# Patient Record
Sex: FEMALE | Race: BLACK OR AFRICAN AMERICAN | NOT HISPANIC OR LATINO | Employment: UNEMPLOYED | URBAN - METROPOLITAN AREA
[De-identification: names, ages, dates, MRNs, and addresses within clinical notes are randomized per-mention and may not be internally consistent; named-entity substitution may affect disease eponyms.]

---

## 2022-04-11 ENCOUNTER — HISTORICAL (OUTPATIENT)
Dept: ADMINISTRATIVE | Facility: HOSPITAL | Age: 52
End: 2022-04-11
Payer: MEDICAID

## 2022-04-28 VITALS
HEIGHT: 63 IN | BODY MASS INDEX: 24.38 KG/M2 | DIASTOLIC BLOOD PRESSURE: 88 MMHG | SYSTOLIC BLOOD PRESSURE: 125 MMHG | WEIGHT: 137.56 LBS

## 2022-05-01 ENCOUNTER — HOSPITAL ENCOUNTER (EMERGENCY)
Facility: HOSPITAL | Age: 52
Discharge: HOME | End: 2022-05-01
Attending: EMERGENCY MEDICINE
Payer: MEDICAID

## 2022-05-01 VITALS
BODY MASS INDEX: 22.32 KG/M2 | OXYGEN SATURATION: 98 % | HEIGHT: 66 IN | RESPIRATION RATE: 18 BRPM | TEMPERATURE: 99 F | SYSTOLIC BLOOD PRESSURE: 178 MMHG | DIASTOLIC BLOOD PRESSURE: 94 MMHG | HEART RATE: 100 BPM | WEIGHT: 138.88 LBS

## 2022-05-01 DIAGNOSIS — M54.50 CHRONIC LOW BACK PAIN WITHOUT SCIATICA, UNSPECIFIED BACK PAIN LATERALITY: Primary | ICD-10-CM

## 2022-05-01 DIAGNOSIS — Z76.0 ENCOUNTER FOR MEDICATION REFILL: ICD-10-CM

## 2022-05-01 DIAGNOSIS — G89.29 CHRONIC LOW BACK PAIN WITHOUT SCIATICA, UNSPECIFIED BACK PAIN LATERALITY: Primary | ICD-10-CM

## 2022-05-01 PROCEDURE — 99284 EMERGENCY DEPT VISIT MOD MDM: CPT

## 2022-05-01 RX ORDER — TRAZODONE HYDROCHLORIDE 100 MG/1
100 TABLET ORAL NIGHTLY
Qty: 30 TABLET | Refills: 1 | Status: SHIPPED | OUTPATIENT
Start: 2022-05-01 | End: 2023-05-01

## 2022-05-01 RX ORDER — TRAMADOL HYDROCHLORIDE 50 MG/1
100 TABLET ORAL EVERY 12 HOURS PRN
Qty: 28 TABLET | Refills: 0 | Status: SHIPPED | OUTPATIENT
Start: 2022-05-01

## 2022-05-01 NOTE — DISCHARGE INSTRUCTIONS
Keep scheduled follow-up appointment.      Let your primary care provider know that your out of prescriptions and will need refills, call them this week.      Expect to get more stiff and sore for 2 or 3 days before gradually returning back to normal.
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
## 2023-07-26 ENCOUNTER — HOSPITAL ENCOUNTER (EMERGENCY)
Facility: HOSPITAL | Age: 53
Discharge: HOME | End: 2023-07-26
Attending: PHYSICAL MEDICINE & REHABILITATION
Payer: MEDICAID

## 2023-07-26 VITALS
DIASTOLIC BLOOD PRESSURE: 126 MMHG | BODY MASS INDEX: 23.78 KG/M2 | HEART RATE: 79 BPM | WEIGHT: 148 LBS | OXYGEN SATURATION: 100 % | SYSTOLIC BLOOD PRESSURE: 153 MMHG | HEIGHT: 66 IN | RESPIRATION RATE: 20 BRPM | TEMPERATURE: 97 F

## 2023-07-26 DIAGNOSIS — G89.29 CHRONIC DENTAL PAIN: ICD-10-CM

## 2023-07-26 DIAGNOSIS — K08.9 CHRONIC DENTAL PAIN: ICD-10-CM

## 2023-07-26 DIAGNOSIS — H92.02 OTALGIA OF LEFT EAR: Primary | ICD-10-CM

## 2023-07-26 PROCEDURE — 99282 EMERGENCY DEPT VISIT SF MDM: CPT

## 2023-07-26 RX ORDER — ONDANSETRON 4 MG/1
8 TABLET, ORALLY DISINTEGRATING ORAL EVERY 8 HOURS PRN
Status: ON HOLD | COMMUNITY
Start: 2023-02-25 | End: 2024-03-29

## 2023-07-26 RX ORDER — AMLODIPINE BESYLATE 5 MG/1
5 TABLET ORAL
COMMUNITY
Start: 2023-03-03

## 2023-07-26 RX ORDER — LORAZEPAM 1 MG/1
1 TABLET ORAL 2 TIMES DAILY PRN
COMMUNITY
Start: 2023-05-12

## 2023-07-26 RX ORDER — CLONIDINE HYDROCHLORIDE 0.2 MG/1
0.2 TABLET ORAL EVERY 8 HOURS PRN
COMMUNITY
Start: 2023-02-26

## 2023-07-26 RX ORDER — ESCITALOPRAM OXALATE 10 MG/1
10 TABLET ORAL
COMMUNITY
Start: 2023-05-12

## 2023-07-26 RX ORDER — IBUPROFEN 800 MG/1
800 TABLET ORAL
COMMUNITY
Start: 2023-03-03

## 2023-07-26 NOTE — ED PROVIDER NOTES
Encounter Date: 7/26/2023       History     Chief Complaint   Patient presents with    Otalgia     Left ear pain and facial pain x 3 days.       PATIENT PRESENTS WITH CHIEF COMPLAINT OF LEFT EAR PAIN NO TRAUMA NO FEVER NO CHILLS NO NAUSEA NO VOMITING    The history is provided by the patient.   Review of patient's allergies indicates:  No Known Allergies  No past medical history on file.  No past surgical history on file.  No family history on file.     Review of Systems   HENT:  Positive for dental problem (CHRONIC POOR DENTITION) and ear pain (LEFT EAR).    All other systems reviewed and are negative.    Physical Exam     Initial Vitals [07/26/23 1544]   BP Pulse Resp Temp SpO2   (!) 153/126 79 20 97.3 °F (36.3 °C) 100 %      MAP       --         Physical Exam    Nursing note and vitals reviewed.  Constitutional: She appears well-developed and well-nourished.   HENT:   Head: Normocephalic and atraumatic.   Right Ear: External ear normal.   Left Ear: External ear normal.   Mouth/Throat: Oropharynx is clear and moist.   BILATERAL EARS ARE NORMAL, POOR DENTITION NO GINGIVAL HYPERPLASIA NO ABSCESS THERE IS TENDERNESS ALONG THE LEFT MANDIBULAR REGION POSSIBLE DENTAL INFECTION   Eyes: EOM are normal. Pupils are equal, round, and reactive to light.   Neck:   Normal range of motion.  Cardiovascular:  Normal rate, regular rhythm, normal heart sounds and intact distal pulses.           Pulmonary/Chest: Breath sounds normal.   Abdominal: Abdomen is soft. Bowel sounds are normal.   Musculoskeletal:         General: Normal range of motion.      Cervical back: Normal range of motion.     Neurological: She is alert and oriented to person, place, and time. She has normal strength. GCS score is 15. GCS eye subscore is 4. GCS verbal subscore is 5. GCS motor subscore is 6.   Skin: Skin is warm and dry.   Psychiatric: She has a normal mood and affect.       ED Course   Procedures  Labs Reviewed - No data to display       Imaging  Results    None          Medications - No data to display  Medical Decision Making:   Initial Assessment:   PATIENT PRESENTS WITH CHIEF COMPLAINT OF LEFT EAR PAIN NO TRAUMA NO FEVER NO CHILLS NO NAUSEA NO VOMITING    The history is provided by the patient.   Differential Diagnosis:   OTITIS MEDIA, OTITIS EXTERNA, DENTAL INFECTION,  ED Management:  DISCUSSED WITH THE PATIENT PROBABLE DENTAL INFECTION SUGGESTED ANTIBIOTICS THIS WAS REFUSED PATIENT STATES IT ALL ANTIBIOTICS UPSET HER STOMACH SHE WILL FOLLOW UP WITH A DENTIST AS INSTRUCTED PATIENT IS HEMODYNAMICALLY STABLE FOR DISCHARGE                        Clinical Impression:   Final diagnoses:  [H92.02] Otalgia of left ear (Primary)  [K08.9, G89.29] Chronic dental pain        ED Disposition Condition    Discharge Stable          ED Prescriptions    None       Follow-up Information       Follow up With Specialties Details Why Contact Info    Kd Ocampo MD Family Medicine  As needed [address] [facility] [place] [address]  [phone]               Quinn Huerta DO  07/26/23 7585

## 2024-03-06 ENCOUNTER — HOSPITAL ENCOUNTER (OUTPATIENT)
Dept: RADIOLOGY | Facility: HOSPITAL | Age: 54
Discharge: HOME | End: 2024-03-06
Attending: NURSE PRACTITIONER
Payer: MEDICAID

## 2024-03-06 DIAGNOSIS — M19.90 OSTEOARTHRITIS: ICD-10-CM

## 2024-03-06 PROCEDURE — 73562 X-RAY EXAM OF KNEE 3: CPT | Mod: TC,RT

## 2024-03-06 PROCEDURE — 73562 X-RAY EXAM OF KNEE 3: CPT | Mod: TC,LT

## 2024-03-19 DIAGNOSIS — M25.661 STIFFNESS OF RIGHT KNEE, NOT ELSEWHERE CLASSIFIED: ICD-10-CM

## 2024-03-19 DIAGNOSIS — M17.10 UNILATERAL PRIMARY OSTEOARTHRITIS, UNSPECIFIED KNEE: Primary | ICD-10-CM

## 2024-03-20 ENCOUNTER — CLINICAL SUPPORT (OUTPATIENT)
Dept: REHABILITATION | Facility: HOSPITAL | Age: 54
End: 2024-03-20
Payer: MEDICAID

## 2024-03-20 DIAGNOSIS — M17.10 UNILATERAL PRIMARY OSTEOARTHRITIS, UNSPECIFIED KNEE: Primary | ICD-10-CM

## 2024-03-20 DIAGNOSIS — M25.661 STIFFNESS OF RIGHT KNEE, NOT ELSEWHERE CLASSIFIED: ICD-10-CM

## 2024-03-20 PROCEDURE — 97163 PT EVAL HIGH COMPLEX 45 MIN: CPT

## 2024-03-20 NOTE — PLAN OF CARE
[facility]  Physical Therapy Initial Evaluation    Name: Shu Mcclure  [facility] Number: [id number]    Therapy Diagnosis:   Encounter Diagnoses   Name Primary?    Unilateral primary osteoarthritis, unspecified knee Yes    Stiffness of right knee, not elsewhere classified      Physician: Matthias Anderson,*FNP-C    Physician Orders: PT Eval and Treat  Medical Diagnosis from Referral: Unilateral primary osteoarthritis right knee  Evaluation Date: 3/20/2024  Authorization Period Expiration: TBD  Plan of Care Expiration: 6/20/2024  Visit # / Visits authorized: 0/ TBD    Time In: 1345  Time Out: 1425  Total Billable Time: 40 minutes    Surgery: None  Orthopedic Precautions: None  Pertinent History: History of bilateral hip pain    Subjective   Shu reports: She comes in with severe right knee pain, chronic long-standing. At current she is disabled. She has been treated for this right knee but no real relief. She had a scope done in which MD scraped away tissue but the following day severe swelling followed by surgery for fluid removal. Since then recurrent swelling in right knee. She wears a hinged knee brace that locks at 0 deg extension. She walks with a straight cane, states she has a rollator but does not use because it is too heavy to maneuver in community. Long history of bilateral hip pain, which she did PT, but notes both hips are significantly weakened. Her chief complaint is severe pain in the right knee. Multiple MD opinions given, states she was told she needs a TKA by one, and another is trying different measures before TKA.    Imaging  EXAMINATION: XR KNEE 3 VIEW RIGHT 3/06/2024  CLINICAL HISTORY: pain; Unspecified osteoarthritis, unspecified site  TECHNIQUE: AP, lateral, and Merchant views of the right knee were performed.  COMPARISON: None  FINDINGS:  Degenerative changes with tricompartmental osteophytes.  There is loss of bilateral joint space.  There is lateral  translocation of the tibia in relation to the femur.  No displaced fracture.  No effusion.  Impression: Moderate degenerative changes with no acute fracture.      EXAMINATION: XR KNEE 3 VIEW LEFT 3/06/2024  CLINICAL HISTORY: pain; Unspecified osteoarthritis, unspecified site  TECHNIQUE: AP, lateral, and Merchant views of the left knee were performed.  COMPARISON: 03/06/2024  FINDINGS:  Degenerative changes with tricompartmental osteophytes.  Mild loss of joint space of the medial compartment.  No effusion.  Impression: Mild to moderate degenerative changes with no acute abnormality.      Medical History:   No past medical history on file.    Surgical History:   Shu Mcclure  has no past surgical history on file.    Medications:   Shu has a current medication list which includes the following prescription(s): amlodipine, clonidine, escitalopram oxalate, ibuprofen, lorazepam, ondansetron, tramadol, and trazodone.    Allergies:   Review of patient's allergies indicates:  No Known Allergies     CMS Impairment/Limitation/Restriction for FOTO Survey  Therapist reviewed FOTO scores for Shu Mcclure on 3/20/2024. FOTO documents entered into [employer] - see Media section.  FOTO filled out by: Patient  Patient's Physical FS Primary Measure: 50  Risk Adjusted Statistical FOTO: 43 (predicted 60 by visit #13)  Limitation Score: 50%  Category: Mobility  KOOS, JR: 59.4% functional    Objective          Posture/Appearance    Right - hinge knee brace, standing in ~20 deg extensor lag, use of straight cane. Mild edema around knee joint line     Palpation    Right - tender to mild palpatory pressure distal medial femur (condyle), medial calf, medial distal hamstrings       Gait    Antalgic use of straight cane     ROM    Knee  Right - 10 deg extension lag, flexion WFL    Left - WFL     Strength    Right - knee flx 3, knee ext 2+, hip ER 3, hip IR 4, ankle DF 4  Left - knee flx 3, knee ext 4, hip ER 3, hip IR 4, ankle DF 4                     TREATMENT     Shu received the treatments listed below:       Time Activities   Manual     TherAct     TherEx     Gait     Neuro Re-ed     Modalities     E-Stim     Dry Needling     Canalith Repositioning       Home Exercises and Patient Education Provided    Education provided:   -Plan of care, HEP, ROM importance    Written Home Exercises Provided: yes.  Exercises were reviewed and Shu was able to demonstrate them prior to the end of the session.  Shu demonstrated good  understanding of the education provided.     Copy of exercises provided placed in paper chart.    Assessment   Shu is a 53 y.o. female referred to outpatient Physical Therapy with a medical diagnosis of right knee pain. Patient presents with severe functional debility 2/2 pain and LE weakness. Patient will benefit from skilled outpatient Physical Therapy to address the deficits stated above and in the chart below, provide education, and to maximize patient's level of independence.    Patient prognosis is Fair.     Plan of care discussed with patient: Yes  Patient's spiritual, cultural and educational needs considered and patient is agreeable to the plan of care and goals as stated below:    Anticipated Barriers for therapy: None    Goals:  Short Term Goals: 6 weeks   Patient will report at least 5% increase in functional capacity from initial KOOS, JR score to indicate clinically significant functional improvement  Patient will report at least 5 point increase on initial FOTO score to indicate clinically significant functional improvement    Long Term Goals: 12 weeks   Patient will report at least 10% increase in functional capacity from initial KOOS, JR score to indicate clinically significant functional improvement  Patient will report at least 10 point increase on initial FOTO score to indicate clinically significant functional improvement    Plan   Plan of care Certification: 3/20/2024 to  6/20/2024.    Outpatient Physical Therapy 2-3 times weekly for 12 weeks to include the following interventions: Gait Training, Manual Therapy, Moist Heat/ Ice, Neuromuscular Re-ed, Patient Education, Self Care, Therapeutic Activities, and Therapeutic Exercise.     Gem Givens, PT

## 2024-03-20 NOTE — PROGRESS NOTES
[facility]  Physical Therapy Initial Evaluation    Name: Shu Mcclure  Clinic Number: [id number]    Therapy Diagnosis:   Encounter Diagnoses   Name Primary?    Unilateral primary osteoarthritis, unspecified knee Yes    Stiffness of right knee, not elsewhere classified      Physician: Matthias Anderson,*FNP-C    Physician Orders: PT Eval and Treat  Medical Diagnosis from Referral: Unilateral primary osteoarthritis right knee  Evaluation Date: 3/20/2024  Authorization Period Expiration: TBD  Plan of Care Expiration: 6/20/2024  Visit # / Visits authorized: 0/ TBD    Time In: 13***  Time Out: 14***  Total Billable Time: *** minutes    Surgery: ***  Orthopedic Precautions: ***  Pertinent History: ***    Subjective   Shu reports: ***    Imaging  EXAMINATION: XR KNEE 3 VIEW RIGHT 3/06/2024  CLINICAL HISTORY: pain; Unspecified osteoarthritis, unspecified site  TECHNIQUE: AP, lateral, and Merchant views of the right knee were performed.  COMPARISON: None  FINDINGS:  Degenerative changes with tricompartmental osteophytes.  There is loss of bilateral joint space.  There is lateral translocation of the tibia in relation to the femur.  No displaced fracture.  No effusion.  Impression: Moderate degenerative changes with no acute fracture.      EXAMINATION: XR KNEE 3 VIEW LEFT 3/06/2024  CLINICAL HISTORY: pain; Unspecified osteoarthritis, unspecified site  TECHNIQUE: AP, lateral, and Merchant views of the left knee were performed.  COMPARISON: 03/06/2024  FINDINGS:  Degenerative changes with tricompartmental osteophytes.  Mild loss of joint space of the medial compartment.  No effusion.  Impression: Mild to moderate degenerative changes with no acute abnormality.      Medical History:   No past medical history on file.    Surgical History:   Shu Mcclure  has no past surgical history on file.    Medications:   Shu has a current medication list which includes the following  prescription(s): amlodipine, clonidine, escitalopram oxalate, ibuprofen, lorazepam, ondansetron, tramadol, and trazodone.    Allergies:   Review of patient's allergies indicates:  No Known Allergies     CMS Impairment/Limitation/Restriction for FOTO Survey  Therapist reviewed FOTO scores for Shu Mcclure on 3/20/2024. FOTO documents entered into [employer] - see Media section.  FOTO filled out by: Patient  Patient's Physical FS Primary Measure: ***  Risk Adjusted Statistical FOTO: ***  Limitation Score: ***%  Category: Mobility  KOOS, JR: ***% functional    Objective          Posture/Appearance    Right - ***; knee alignment {KNEE ALIGNMENT:62785}; Foot alignment ***; spinal curvature; edema ***; wound/surgical site ***    Left - ***; knee alignment {KNEE ALIGNMENT:70749}; Foot alignment ***; spinal curvature; edema ***; wound/surgical site ***     Palpation    Right - tender to *** palpatory pressure ***    Left - tender to *** palpatory pressure ***   Gait    ***     ROM    Knee  Right - ***    Left - ***     Strength    Right - knee flx, knee ext, hip ER, hip IR  Left - knee flx, knee ext, hip ER, hip IR     Dermatomes    Right - ***    Left - ***     Reflexes    Clonus: ***    Right - patellar *** ; Achilles ***  Left - patellar *** ; Achilles ***     Special Tests    Ant. Drawer: (***) right, (***) left  Post. Drawer: (***) right, (***) left  Lachman's: (***) right, (***) left  Valgus at 0 Deg: (***) right, (***) left  Valgus at 30 Deg: (***) right, (***) left  Varus at 0 Deg: (***) right, (***) left  Varus at 30 Deg: (***) right, (***) left  Sadaf's: (***) right, (***) left  Apley's Compression: (***) right, (***) left  Apley's Distraction: (***) right, (***) left  Tibial Interal Rot: (***) right, (***) left  Tibial External Rot: (***) right, (***) left  Proximal Tib/Fib: (***) right, (***) left  Dejan: (***) right, (***) left  Houghston's: (***) right, (***) left  Leslie's Sign: (***) right, (***)  "left  Ramirez's Sign (patellar grind): (***) right, (***) left  Patellar Tracking: (***) right, (***) left       Hip/Lumbar Exam    Lumbar ROM  {LUMBAR ROM:41933}    MITCHELL: (***) left, (***) right  FADDIR: (***) left, (***) right  Logroll: (***) left, (***) right  90/90 hamstring test: (***) left, (***) right  Piriformis test: (***) left, (***) right  Elver test: (***) left, (***) right  Hip IR ROM: *** deg left, *** deg right  Hip ER ROM: *** deg left, ***deg right  Charito's test: (***) left, (***) right  Labral Test: (***) left, (***) right     Functional Testing    Single Leg Squat  --Right knee - {KNEE ALIGNMENT:21420}  --Left knee - {KNEE ALIGNMENT:37100}      Double Leg Squat  --Right knee - {KNEE ALIGNMENT:54369}  --Left knee - {KNEE ALIGNMENT:37882}                        TREATMENT     Shu received the treatments listed below:       Time Activities   Manual     TherAct     TherEx     Gait     Neuro Re-ed     Modalities     E-Stim     Dry Needling     Canalith Repositioning       Home Exercises and Patient Education Provided    Education provided:   -Plan of care, HEP, ***    Written Home Exercises Provided: {Blank single:83687::"yes","Patient instructed to cont prior HEP"}.  Exercises were reviewed and Shu was able to demonstrate them prior to the end of the session.  Shu demonstrated {Desc; good/fair/poor:54048} understanding of the education provided.     Copy of exercises provided placed in paper chart.    Assessment   Shu is a 53 y.o. female referred to outpatient Physical Therapy with a medical diagnosis of ***. Patient presents with ***. Patient will benefit from skilled outpatient Physical Therapy to address the deficits stated above and in the chart below, provide education, and to maximize patient's level of independence.    Patient prognosis is {REHAB PROGNOSIS OHS:79982}.     Plan of care discussed with patient: Yes  Patient's spiritual, cultural and educational needs considered " and patient is agreeable to the plan of care and goals as stated below:    Anticipated Barriers for therapy: ***    Goals:  Short Term Goals: 6 weeks   Patient will report at least 10% increase in functional capacity from initial KOOS, JR score to indicate clinically significant functional improvement  Patient will report at least 10 point increase on initial FOTO score to indicate clinically significant functional improvement  Patient will ***    Long Term Goals: 12 weeks   Patient will report at least 20% increase in functional capacity from initial KOOS, JR score to indicate clinically significant functional improvement  Patient will report at least 20 point increase on initial FOTO score to indicate clinically significant functional improvement  Patient will ***    Plan   Plan of care Certification: 3/20/2024 to 6/20/2024.    Outpatient Physical Therapy 2-3 times weekly for 12 weeks to include the following interventions: Gait Training, Manual Therapy, Moist Heat/ Ice, Neuromuscular Re-ed, Patient Education, Self Care, Therapeutic Activities, and Therapeutic Exercise.     Gem Givens, PT

## 2024-03-26 ENCOUNTER — HOSPITAL ENCOUNTER (INPATIENT)
Facility: HOSPITAL | Age: 54
LOS: 3 days | Discharge: HOME | DRG: 439 | End: 2024-03-29
Attending: FAMILY MEDICINE | Admitting: INTERNAL MEDICINE
Payer: MEDICAID

## 2024-03-26 DIAGNOSIS — R10.9 ABDOMINAL PAIN: ICD-10-CM

## 2024-03-26 DIAGNOSIS — R10.13 EPIGASTRIC ABDOMINAL PAIN: ICD-10-CM

## 2024-03-26 DIAGNOSIS — K85.00 IDIOPATHIC ACUTE PANCREATITIS, UNSPECIFIED COMPLICATION STATUS: Primary | ICD-10-CM

## 2024-03-26 PROBLEM — I10 HTN (HYPERTENSION): Status: ACTIVE | Noted: 2024-03-26

## 2024-03-26 PROBLEM — K21.9 GERD (GASTROESOPHAGEAL REFLUX DISEASE): Status: ACTIVE | Noted: 2024-03-26

## 2024-03-26 LAB
ALBUMIN SERPL-MCNC: 4.3 G/DL (ref 3.5–5)
ALBUMIN/GLOB SERPL: 1.2 RATIO (ref 1.1–2)
ALP SERPL-CCNC: 96 UNIT/L (ref 40–150)
ALT SERPL-CCNC: 27 UNIT/L (ref 0–55)
AMPHET UR QL SCN: NEGATIVE
AMYLASE SERPL-CCNC: 432 UNIT/L (ref 25–125)
AST SERPL-CCNC: 25 UNIT/L (ref 5–34)
B-HCG SERPL QL: NEGATIVE
BARBITURATE SCN PRESENT UR: NEGATIVE
BASOPHILS # BLD AUTO: 0.04 X10(3)/MCL
BASOPHILS NFR BLD AUTO: 0.3 %
BENZODIAZ UR QL SCN: NEGATIVE
BILIRUB SERPL-MCNC: 0.5 MG/DL
BUN SERPL-MCNC: 12 MG/DL (ref 9.8–20.1)
CALCIUM SERPL-MCNC: 9.3 MG/DL (ref 8.4–10.2)
CANNABINOIDS UR QL SCN: NEGATIVE
CHLORIDE SERPL-SCNC: 108 MMOL/L (ref 98–107)
CO2 SERPL-SCNC: 21 MMOL/L (ref 22–29)
COCAINE UR QL SCN: NEGATIVE
CREAT SERPL-MCNC: 0.77 MG/DL (ref 0.55–1.02)
EOSINOPHIL # BLD AUTO: 0.15 X10(3)/MCL (ref 0–0.9)
EOSINOPHIL NFR BLD AUTO: 1.2 %
ERYTHROCYTE [DISTWIDTH] IN BLOOD BY AUTOMATED COUNT: 13.2 % (ref 11.5–17)
FENTANYL UR QL SCN: NEGATIVE
GFR SERPLBLD CREATININE-BSD FMLA CKD-EPI: >60 MLS/MIN/1.73/M2
GLOBULIN SER-MCNC: 3.6 GM/DL (ref 2.4–3.5)
GLUCOSE SERPL-MCNC: 116 MG/DL (ref 74–100)
HCT VFR BLD AUTO: 43.4 % (ref 37–47)
HGB BLD-MCNC: 15.1 G/DL (ref 12–16)
IMM GRANULOCYTES # BLD AUTO: 0.04 X10(3)/MCL (ref 0–0.04)
IMM GRANULOCYTES NFR BLD AUTO: 0.3 %
LIPASE SERPL-CCNC: 835 U/L
LYMPHOCYTES # BLD AUTO: 4.06 X10(3)/MCL (ref 0.6–4.6)
LYMPHOCYTES NFR BLD AUTO: 32.9 %
MCH RBC QN AUTO: 32.9 PG (ref 27–31)
MCHC RBC AUTO-ENTMCNC: 34.8 G/DL (ref 33–36)
MCV RBC AUTO: 94.6 FL (ref 80–94)
MDMA UR QL SCN: NEGATIVE
MONOCYTES # BLD AUTO: 0.81 X10(3)/MCL (ref 0.1–1.3)
MONOCYTES NFR BLD AUTO: 6.6 %
NEUTROPHILS # BLD AUTO: 7.23 X10(3)/MCL (ref 2.1–9.2)
NEUTROPHILS NFR BLD AUTO: 58.7 %
NRBC BLD AUTO-RTO: 0 %
OPIATES UR QL SCN: NEGATIVE
PCP UR QL: NEGATIVE
PH UR: 5 [PH] (ref 3–11)
PLATELET # BLD AUTO: 375 X10(3)/MCL (ref 130–400)
PMV BLD AUTO: 8.3 FL (ref 7.4–10.4)
POTASSIUM SERPL-SCNC: 3 MMOL/L (ref 3.5–5.1)
PROT SERPL-MCNC: 7.9 GM/DL (ref 6.4–8.3)
RBC # BLD AUTO: 4.59 X10(6)/MCL (ref 4.2–5.4)
SODIUM SERPL-SCNC: 145 MMOL/L (ref 136–145)
SPECIFIC GRAVITY, URINE AUTO (.000) (OHS): 1.01 (ref 1–1.03)
WBC # SPEC AUTO: 12.33 X10(3)/MCL (ref 4.5–11.5)

## 2024-03-26 PROCEDURE — 99285 EMERGENCY DEPT VISIT HI MDM: CPT | Mod: 25

## 2024-03-26 PROCEDURE — 96361 HYDRATE IV INFUSION ADD-ON: CPT

## 2024-03-26 PROCEDURE — 81003 URINALYSIS AUTO W/O SCOPE: CPT | Mod: 59 | Performed by: INTERNAL MEDICINE

## 2024-03-26 PROCEDURE — 25500020 PHARM REV CODE 255: Performed by: FAMILY MEDICINE

## 2024-03-26 PROCEDURE — 25000003 PHARM REV CODE 250: Performed by: INTERNAL MEDICINE

## 2024-03-26 PROCEDURE — 82150 ASSAY OF AMYLASE: CPT | Performed by: FAMILY MEDICINE

## 2024-03-26 PROCEDURE — 80307 DRUG TEST PRSMV CHEM ANLYZR: CPT | Performed by: FAMILY MEDICINE

## 2024-03-26 PROCEDURE — 25000003 PHARM REV CODE 250: Performed by: FAMILY MEDICINE

## 2024-03-26 PROCEDURE — 63600175 PHARM REV CODE 636 W HCPCS: Performed by: FAMILY MEDICINE

## 2024-03-26 PROCEDURE — 63600175 PHARM REV CODE 636 W HCPCS: Performed by: INTERNAL MEDICINE

## 2024-03-26 PROCEDURE — 96375 TX/PRO/DX INJ NEW DRUG ADDON: CPT

## 2024-03-26 PROCEDURE — 80053 COMPREHEN METABOLIC PANEL: CPT | Performed by: FAMILY MEDICINE

## 2024-03-26 PROCEDURE — 85025 COMPLETE CBC W/AUTO DIFF WBC: CPT | Performed by: FAMILY MEDICINE

## 2024-03-26 PROCEDURE — C9113 INJ PANTOPRAZOLE SODIUM, VIA: HCPCS | Performed by: FAMILY MEDICINE

## 2024-03-26 PROCEDURE — 96365 THER/PROPH/DIAG IV INF INIT: CPT

## 2024-03-26 PROCEDURE — 94761 N-INVAS EAR/PLS OXIMETRY MLT: CPT

## 2024-03-26 PROCEDURE — 81025 URINE PREGNANCY TEST: CPT | Performed by: FAMILY MEDICINE

## 2024-03-26 PROCEDURE — 83690 ASSAY OF LIPASE: CPT | Performed by: FAMILY MEDICINE

## 2024-03-26 PROCEDURE — 11000001 HC ACUTE MED/SURG PRIVATE ROOM

## 2024-03-26 RX ORDER — BACLOFEN 10 MG/1
5 TABLET ORAL 3 TIMES DAILY PRN
COMMUNITY

## 2024-03-26 RX ORDER — TALC
6 POWDER (GRAM) TOPICAL NIGHTLY PRN
Status: DISCONTINUED | OUTPATIENT
Start: 2024-03-26 | End: 2024-03-26

## 2024-03-26 RX ORDER — HYDROMORPHONE HYDROCHLORIDE 2 MG/ML
1 INJECTION, SOLUTION INTRAMUSCULAR; INTRAVENOUS; SUBCUTANEOUS EVERY 6 HOURS PRN
Status: DISCONTINUED | OUTPATIENT
Start: 2024-03-26 | End: 2024-03-29

## 2024-03-26 RX ORDER — DIPHENHYDRAMINE HYDROCHLORIDE 50 MG/ML
12.5 INJECTION INTRAMUSCULAR; INTRAVENOUS
Status: COMPLETED | OUTPATIENT
Start: 2024-03-26 | End: 2024-03-26

## 2024-03-26 RX ORDER — OSELTAMIVIR PHOSPHATE 75 MG/1
CAPSULE ORAL
Status: ON HOLD | COMMUNITY
Start: 2024-01-02 | End: 2024-03-29 | Stop reason: HOSPADM

## 2024-03-26 RX ORDER — LOSARTAN POTASSIUM 25 MG/1
25 TABLET ORAL DAILY
COMMUNITY

## 2024-03-26 RX ORDER — SODIUM CHLORIDE 0.9 % (FLUSH) 0.9 %
10 SYRINGE (ML) INJECTION
Status: DISCONTINUED | OUTPATIENT
Start: 2024-03-26 | End: 2024-03-29 | Stop reason: HOSPADM

## 2024-03-26 RX ORDER — ONDANSETRON HYDROCHLORIDE 2 MG/ML
4 INJECTION, SOLUTION INTRAVENOUS
Status: COMPLETED | OUTPATIENT
Start: 2024-03-26 | End: 2024-03-26

## 2024-03-26 RX ORDER — HYDRALAZINE HYDROCHLORIDE 20 MG/ML
5 INJECTION INTRAMUSCULAR; INTRAVENOUS EVERY 6 HOURS PRN
Status: DISCONTINUED | OUTPATIENT
Start: 2024-03-26 | End: 2024-03-29 | Stop reason: HOSPADM

## 2024-03-26 RX ORDER — ACETAMINOPHEN 650 MG/1
650 SUPPOSITORY RECTAL EVERY 6 HOURS PRN
Status: DISCONTINUED | OUTPATIENT
Start: 2024-03-26 | End: 2024-03-29 | Stop reason: HOSPADM

## 2024-03-26 RX ORDER — MEPERIDINE HYDROCHLORIDE 25 MG/ML
12.5 INJECTION INTRAMUSCULAR; INTRAVENOUS; SUBCUTANEOUS
Status: COMPLETED | OUTPATIENT
Start: 2024-03-26 | End: 2024-03-26

## 2024-03-26 RX ORDER — PANTOPRAZOLE SODIUM 40 MG/1
40 TABLET, DELAYED RELEASE ORAL DAILY
COMMUNITY

## 2024-03-26 RX ORDER — FAMOTIDINE 10 MG/ML
20 INJECTION INTRAVENOUS 2 TIMES DAILY
Status: COMPLETED | OUTPATIENT
Start: 2024-03-26 | End: 2024-03-26

## 2024-03-26 RX ORDER — MEPERIDINE HYDROCHLORIDE 25 MG/ML
12.5 INJECTION INTRAMUSCULAR; INTRAVENOUS; SUBCUTANEOUS EVERY 4 HOURS PRN
Status: DISCONTINUED | OUTPATIENT
Start: 2024-03-26 | End: 2024-03-26

## 2024-03-26 RX ORDER — FAMOTIDINE 10 MG/ML
20 INJECTION INTRAVENOUS 2 TIMES DAILY
Status: DISCONTINUED | OUTPATIENT
Start: 2024-03-26 | End: 2024-03-26

## 2024-03-26 RX ORDER — ONDANSETRON HYDROCHLORIDE 2 MG/ML
4 INJECTION, SOLUTION INTRAVENOUS EVERY 6 HOURS PRN
Status: DISCONTINUED | OUTPATIENT
Start: 2024-03-26 | End: 2024-03-29 | Stop reason: HOSPADM

## 2024-03-26 RX ORDER — PANTOPRAZOLE SODIUM 40 MG/10ML
40 INJECTION, POWDER, LYOPHILIZED, FOR SOLUTION INTRAVENOUS DAILY
Status: DISCONTINUED | OUTPATIENT
Start: 2024-03-27 | End: 2024-03-29

## 2024-03-26 RX ORDER — SODIUM CHLORIDE 9 MG/ML
1000 INJECTION, SOLUTION INTRAVENOUS
Status: COMPLETED | OUTPATIENT
Start: 2024-03-26 | End: 2024-03-26

## 2024-03-26 RX ORDER — POTASSIUM CHLORIDE 7.45 MG/ML
10 INJECTION INTRAVENOUS
Status: COMPLETED | OUTPATIENT
Start: 2024-03-26 | End: 2024-03-26

## 2024-03-26 RX ORDER — PROCHLORPERAZINE EDISYLATE 5 MG/ML
5 INJECTION INTRAMUSCULAR; INTRAVENOUS
Status: COMPLETED | OUTPATIENT
Start: 2024-03-26 | End: 2024-03-26

## 2024-03-26 RX ORDER — SODIUM CHLORIDE 9 MG/ML
INJECTION, SOLUTION INTRAVENOUS CONTINUOUS
Status: DISCONTINUED | OUTPATIENT
Start: 2024-03-26 | End: 2024-03-26

## 2024-03-26 RX ORDER — PANTOPRAZOLE SODIUM 40 MG/10ML
40 INJECTION, POWDER, LYOPHILIZED, FOR SOLUTION INTRAVENOUS
Status: COMPLETED | OUTPATIENT
Start: 2024-03-26 | End: 2024-03-26

## 2024-03-26 RX ORDER — MEPERIDINE HYDROCHLORIDE 25 MG/ML
12.5 INJECTION INTRAMUSCULAR; INTRAVENOUS; SUBCUTANEOUS EVERY 4 HOURS PRN
Status: DISPENSED | OUTPATIENT
Start: 2024-03-26 | End: 2024-03-27

## 2024-03-26 RX ORDER — SODIUM CHLORIDE 9 MG/ML
INJECTION, SOLUTION INTRAVENOUS CONTINUOUS
Status: DISCONTINUED | OUTPATIENT
Start: 2024-03-26 | End: 2024-03-27

## 2024-03-26 RX ORDER — POTASSIUM CHLORIDE 7.45 MG/ML
30 INJECTION INTRAVENOUS ONCE
Status: COMPLETED | OUTPATIENT
Start: 2024-03-26 | End: 2024-03-26

## 2024-03-26 RX ADMIN — MEPERIDINE HYDROCHLORIDE 12.5 MG: 25 INJECTION INTRAMUSCULAR; INTRAVENOUS; SUBCUTANEOUS at 11:03

## 2024-03-26 RX ADMIN — SODIUM CHLORIDE 1000 ML: 9 INJECTION, SOLUTION INTRAVENOUS at 11:03

## 2024-03-26 RX ADMIN — ONDANSETRON 4 MG: 2 INJECTION INTRAMUSCULAR; INTRAVENOUS at 11:03

## 2024-03-26 RX ADMIN — FAMOTIDINE 20 MG: 10 INJECTION, SOLUTION INTRAVENOUS at 09:03

## 2024-03-26 RX ADMIN — LORAZEPAM 1 MG: 2 INJECTION INTRAMUSCULAR; INTRAVENOUS at 09:03

## 2024-03-26 RX ADMIN — SODIUM CHLORIDE 1000 ML: 9 INJECTION, SOLUTION INTRAVENOUS at 07:03

## 2024-03-26 RX ADMIN — MEPERIDINE HYDROCHLORIDE 12.5 MG: 25 INJECTION INTRAMUSCULAR; INTRAVENOUS; SUBCUTANEOUS at 05:03

## 2024-03-26 RX ADMIN — HYDROMORPHONE HYDROCHLORIDE 1 MG: 2 INJECTION, SOLUTION INTRAMUSCULAR; INTRAVENOUS; SUBCUTANEOUS at 03:03

## 2024-03-26 RX ADMIN — DIPHENHYDRAMINE HYDROCHLORIDE 12.5 MG: 50 INJECTION INTRAMUSCULAR; INTRAVENOUS at 12:03

## 2024-03-26 RX ADMIN — POTASSIUM CHLORIDE 10 MEQ: 7.46 INJECTION, SOLUTION INTRAVENOUS at 12:03

## 2024-03-26 RX ADMIN — IOPAMIDOL 100 ML: 755 INJECTION, SOLUTION INTRAVENOUS at 12:03

## 2024-03-26 RX ADMIN — HYDROMORPHONE HYDROCHLORIDE 1 MG: 2 INJECTION, SOLUTION INTRAMUSCULAR; INTRAVENOUS; SUBCUTANEOUS at 09:03

## 2024-03-26 RX ADMIN — PANTOPRAZOLE SODIUM 40 MG: 40 INJECTION, POWDER, FOR SOLUTION INTRAVENOUS at 11:03

## 2024-03-26 RX ADMIN — SODIUM CHLORIDE 1000 ML: 9 INJECTION, SOLUTION INTRAVENOUS at 01:03

## 2024-03-26 RX ADMIN — SODIUM CHLORIDE: 9 INJECTION, SOLUTION INTRAVENOUS at 03:03

## 2024-03-26 RX ADMIN — PROCHLORPERAZINE EDISYLATE 5 MG: 5 INJECTION INTRAMUSCULAR; INTRAVENOUS at 12:03

## 2024-03-26 RX ADMIN — POTASSIUM CHLORIDE 30 MEQ: 7.46 INJECTION, SOLUTION INTRAVENOUS at 03:03

## 2024-03-26 NOTE — ED NOTES
Patient brought to ER room 3 via EMS.  Stated that last night she ate some french fries from [employer] and started having left upper quadrant pain.  Stated that pain is a 10/10 on pain scale and sharp.  Mentions that she has a history of GI issues which she takes medication,but it has never felt like this.  Pt recently had Gallbladder removed 2 months ago in [place] and was suppose to have a knee replacement today in [place], but couldn't make it due to stomach issues.  Pt nauseated and dry heaving into emesis bag.

## 2024-03-26 NOTE — ED PROVIDER NOTES
Encounter Date: 3/26/2024       History     Chief Complaint   Patient presents with    Abdominal Pain    Vomiting    Nausea    Diarrhea     Abd pain with N/V/D started yesterday after eating fries from [employer]. States her GB was removed 3-4 months ago. Abd pain upper left Quad.     Patient is a 53-year-old female states that she had a gallbladder removed approximately 2 months ago.  She was supposed to go in for surgery today but yesterday she started having severe abdominal pain and severe nausea with some vomiting of bile states that she can not hold anything down and her pain level is 10/10 mostly in his left upper quadrant.  She denies using any new medicines.  She denies using alcohol.    The history is provided by the patient.     Review of patient's allergies indicates:  No Known Allergies  Past Medical History:   Diagnosis Date    GERD (gastroesophageal reflux disease)     Hypertension      History reviewed. No pertinent surgical history.  History reviewed. No pertinent family history.  Social History     Tobacco Use    Smoking status: Never    Smokeless tobacco: Never   Substance Use Topics    Alcohol use: Never    Drug use: Never     Review of Systems   Constitutional: Negative.    HENT: Negative.     Respiratory: Negative.     Cardiovascular: Negative.    Gastrointestinal:  Positive for abdominal pain.   Endocrine: Negative.    Musculoskeletal: Negative.    Skin: Negative.    Neurological: Negative.    Psychiatric/Behavioral: Negative.     All other systems reviewed and are negative.      Physical Exam     Initial Vitals [03/26/24 1124]   BP Pulse Resp Temp SpO2   (!) 127/107 95 18 97.3 °F (36.3 °C) 99 %      MAP       --         Physical Exam    Nursing note and vitals reviewed.  Constitutional: She appears well-developed.   HENT:   Head: Normocephalic.   Eyes: Pupils are equal, round, and reactive to light.   Neck:   Normal range of motion.  Cardiovascular:  Normal rate, regular rhythm and normal  heart sounds.           Pulmonary/Chest: Breath sounds normal.   Abdominal: Abdomen is soft.       Musculoskeletal:         General: Normal range of motion.      Cervical back: Normal range of motion.     Neurological: She is alert and oriented to person, place, and time.   Skin: Skin is warm and dry.   Psychiatric: She has a normal mood and affect.         ED Course   Critical Care    Date/Time: 3/26/2024 1:42 PM    Performed by: Janette Borja MD  Authorized by: Janette Borja MD  Direct patient critical care time: 20 minutes  Additional history critical care time: 10 minutes  Ordering / reviewing critical care time: 10 minutes  Documentation critical care time: 10 minutes  Consulting other physicians critical care time: 10 minutes  Consult with family critical care time: 10 minutes  Total critical care time (exclusive of procedural time) : 70 minutes  Critical care was necessary to treat or prevent imminent or life-threatening deterioration of the following conditions: dehydration and sepsis.  Critical care was time spent personally by me on the following activities: examination of patient, obtaining history from patient or surrogate, discussions with primary provider, gastric intubation, re-evaluation of patient's condition, pulse oximetry, ordering and review of radiographic studies and ordering and review of laboratory studies.        Labs Reviewed   COMPREHENSIVE METABOLIC PANEL - Abnormal; Notable for the following components:       Result Value    Potassium Level 3.0 (*)     Chloride 108 (*)     Carbon Dioxide 21 (*)     Glucose Level 116 (*)     Globulin 3.6 (*)     All other components within normal limits   AMYLASE - Abnormal; Notable for the following components:    Amylase Level 432 (*)     All other components within normal limits   LIPASE - Abnormal; Notable for the following components:    Lipase Level 835 (*)     All other components within normal limits   CBC WITH  DIFFERENTIAL - Abnormal; Notable for the following components:    WBC 12.33 (*)     MCV 94.6 (*)     MCH 32.9 (*)     All other components within normal limits   CBC W/ AUTO DIFFERENTIAL    Narrative:     The following orders were created for panel order CBC auto differential.  Procedure                               Abnormality         Status                     ---------                               -----------         ------                     CBC with Differential[[id number]]        Abnormal            Final result                 Please view results for these tests on the individual orders.   PREGNANCY TEST, URINE RAPID   DRUG SCREEN, URINE (BEAKER)          Imaging Results              CT Abdomen Pelvis With IV Contrast NO Oral Contrast (Final result)  Result time 03/26/24 12:41:09      Final result by Kadi Muller MD (03/26/24 12:41:09)                   Impression:      1. Findings suggestive of acute pancreatitis with peripancreatic inflammatory changes and fluid.  No drainable fluid collection or free air.  2. Diffuse hepatic steatosis.      Electronically signed by: Kadi Muller  Date:    03/26/2024  Time:    12:41               Narrative:    EXAMINATION:  CT ABDOMEN PELVIS WITH IV CONTRAST    CLINICAL HISTORY:  left upper abd / pancreas;    TECHNIQUE:  CT imaging was performed of the abdomen and pelvis after the administration of intravenous contrast. Dose length product is 414 mGycm. Automatic exposure control, adjustment of mA/kV or iterative reconstruction technique was used to limit radiation dose.    COMPARISON:  CT abdomen pelvis dated 06/11/2021    FINDINGS:  Liver: Diffuse hepatic steatosis.    Gallbladder and biliary tree: The gallbladder has been surgically resected.  No intra or extrahepatic biliary ductal dilation.    Pancreas: The pancreas enhances normally abut appears edematous, with peripancreatic inflammatory changes and fluid.  No pancreatic ductal dilatation.    Spleen:  Normal.    Adrenals: Normal.    Kidneys and ureters: There is no hydronephrosis.    Bladder: Normal.    Reproductive organs: The uterus has been surgically resected.    Stomach/bowel: No evidence of bowel obstruction. Appendix is normal. Colonic diverticulosis without inflammatory changes.    Lymph nodes: No pathologically enlarged lymph node identified.    Peritoneum: No drainable fluid collection or free air.    Vessels: No abdominal aortic aneurysm.    Abdominal wall: Normal.    Lung bases: No consolidation or pleural effusion.    Bones: No acute osseous findings.                                       X-Ray Abdomen AP 1 View (KUB) (Final result)  Result time 03/26/24 12:03:31      Final result by Tank Fields MD (03/26/24 12:03:31)                   Impression:      Nonspecific gas pattern      Electronically signed by: Tank Fields  Date:    03/26/2024  Time:    12:03               Narrative:    EXAMINATION:  XR ABDOMEN AP 1 VIEW    CLINICAL HISTORY:  Unspecified abdominal pain    TECHNIQUE:  AP X-RAY OF THE ABDOMEN:    CPT 35076    FINDINGS:  Examination reveals some residual feces throughout the colon the gas pattern is nonspecific with no clear evidence of ileus or obstruction no abnormal masses or calcifications identified                                       X-Ray Chest AP Portable (Final result)  Result time 03/26/24 12:31:58      Final result by Tank Fields MD (03/26/24 12:31:58)                   Impression:      No acute chest disease is identified.      Electronically signed by: Tank Fields  Date:    03/26/2024  Time:    12:31               Narrative:    EXAMINATION:  XR CHEST AP PORTABLE    CLINICAL HISTORY:  , Epigastric pain.    COMPARISON:  January 13, 2022    FINDINGS:  No alveolar consolidation, effusion, or pneumothorax is seen.   The thoracic aorta is normal  cardiac silhouette, central pulmonary vessels and mediastinum are normal in size and are grossly unremarkable.    visualized osseous structures are grossly unremarkable.                                       Medications   sodium chloride 0.9% bolus 1,000 mL 1,000 mL (1,000 mLs Intravenous New Bag 3/26/24 1312)   sodium chloride 0.9% bolus 1,000 mL 1,000 mL (0 mLs Intravenous Stopped 3/26/24 1301)   ondansetron injection 4 mg (4 mg Intravenous Given 3/26/24 1136)   pantoprazole injection 40 mg (40 mg Intravenous Given 3/26/24 1136)   meperidine (PF) injection 12.5 mg (12.5 mg Intravenous Given 3/26/24 1136)   iopamidoL (ISOVUE-370) injection 100 mL (100 mLs Intravenous Given 3/26/24 1217)   potassium chloride 10 mEq in 100 mL IVPB (10 mEq Intravenous New Bag 3/26/24 1229)   prochlorperazine injection Soln 5 mg (5 mg Intravenous Given 3/26/24 1208)   diphenhydrAMINE injection 12.5 mg (12.5 mg Intravenous Given 3/26/24 1208)     Medical Decision Making  This patient is a 53-year-old female comes to the emergency room with pain and left upper quadrant and intractable vomiting states that she ate some [employer] fries from [employer] yesterday and has been sick ever since she also states that she had gallbladder surgery 2 months ago  Differential diagnosis:  Bowel obstruction, pancreatitis, acute abdomen    Amount and/or Complexity of Data Reviewed  Labs: ordered.     Details: Patient's labs show a lipase of 835, amylase of 432, potassium of 3.0, chloride of 108, carbon 1, glucose of 116, WBC count of 12.33  Radiology: ordered.     Details: Patient had a chest x-ray to rule out perforated bowel it shows no acute chest disease.  X-ray of the abdomen shows nonspecific gas pattern.  CT of the abdomen and pelvis with IV contrast is suggestive of acute pancreatitis with peripancreatic inflammatory changes and fluid but no drainable fluid collection or free air.                                      Clinical Impression:  Final diagnoses:  [R10.13] Epigastric abdominal pain  [R10.9] Abdominal pain  [K85.00] Idiopathic acute pancreatitis,  unspecified complication status (Primary)          ED Disposition Condition    Admit Stable                Janette Borja MD  03/26/24 5704

## 2024-03-26 NOTE — H&P
"  [facility] - Medical Surgical Unit  [facility] Medicine  History & Physical    Patient Name: Shu Mcclure  MRN: [id number]  Patient Class: IP- Inpatient  Admission Date: 3/26/2024  Attending Physician: Britany Reddy DO   Primary Care Provider: Kd Ocampo MD         Patient information was obtained from patient and ER records.     Subjective:     Principal Problem:Idiopathic acute pancreatitis    Chief Complaint:   Chief Complaint   Patient presents with    Abdominal Pain    Vomiting    Nausea    Diarrhea     Abd pain with N/V/D started yesterday after eating fries from [employer]. States her GB was removed 3-4 months ago. Abd pain upper left Quad.        HPI: 54yo female presented to the ED 3/26/24 with complaints of LUQ and epigastric pain and N/V/D since yesterday. PMH HTN, GERD, and cholecystectomy 3-4 months ago. She had diarrhea yesterday. None today. Had a BM in the ED. ED dr montes de oca order C diff PCR. Vitals significant for HTN. All other vitals unremarkable. Labs significant for amylase 432, lipase 835, WBC 12.33, K 3.0. BUN, Cr, H/H all within normal limits. Chest xr and abd xr unremarkable. CT abd/pelvis revealed "Findings suggestive of acute pancreatitis with peripancreatic inflammatory changes and fluid. No drainable fluid collection or free air. Diffuse hepatic steatosis."  Pt was given 2 Liter NS bolus is ED and then started on NS 250cc/hr, along with zofran, meperidine, Kcl. Hospital medicine was consulted for admission for continue treatment.    Upon examination, pt endorses epigastric and ruq abd pain, nausea. States pain is 20/10. States pain and nausea meds in ER did not help. Endorses chronic right knee pain for which she was scheduled to have a knee surgery for today. Due to her knee pain, she falls at home often. Denies any confusion, dizziness, SOB, or chest pain.Quit smoking cigarettes 10 years ago.    Past Medical History:   Diagnosis Date    GERD (gastroesophageal " reflux disease)     Hypertension        History reviewed. No pertinent surgical history.    Review of patient's allergies indicates:  No Known Allergies    No current facility-administered medications on file prior to encounter.     Current Outpatient Medications on File Prior to Encounter   Medication Sig    amLODIPine (NORVASC) 5 MG tablet Take 5 mg by mouth.    baclofen (LIORESAL) 10 MG tablet Take 5 mg by mouth 3 (three) times daily as needed.    cloNIDine (CATAPRES) 0.2 MG tablet Take 0.2 mg by mouth every 8 (eight) hours as needed.    LORazepam (ATIVAN) 1 MG tablet Take 1 mg by mouth 2 (two) times daily as needed for Anxiety.    losartan (COZAAR) 25 MG tablet Take 25 mg by mouth once daily.    oseltamivir (TAMIFLU) 75 MG capsule TAKE 1 CAPSULE BY MOUTH TWICE A DAY FOR 5 DAYS    pantoprazole (PROTONIX) 40 MG tablet Take 40 mg by mouth once daily.    EScitalopram oxalate (LEXAPRO) 10 MG tablet Take 10 mg by mouth.    ibuprofen (ADVIL,MOTRIN) 800 MG tablet Take 800 mg by mouth.    ondansetron (ZOFRAN-ODT) 4 MG TbDL Take 8 mg by mouth every 8 (eight) hours as needed.    traMADoL (ULTRAM) 50 mg tablet Take 2 tablets (100 mg total) by mouth every 12 (twelve) hours as needed for Pain.    traZODone (DESYREL) 100 MG tablet Take 1 tablet (100 mg total) by mouth every evening.     Family History    None       Tobacco Use    Smoking status: Never    Smokeless tobacco: Never   Substance and Sexual Activity    Alcohol use: Never    Drug use: Never    Sexual activity: Not on file     Review of Systems   Constitutional:  Positive for activity change, appetite change and diaphoresis. Negative for fever.   Respiratory:  Negative for chest tightness and shortness of breath.    Cardiovascular:  Negative for chest pain and leg swelling.   Gastrointestinal:  Positive for abdominal pain, diarrhea, nausea and vomiting. Negative for abdominal distention and constipation.   Musculoskeletal:  Positive for arthralgias and gait problem.    Skin:  Negative for rash and wound.   Neurological:  Negative for dizziness, weakness and headaches.     Objective:     Vital Signs (Most Recent):  Temp: 97.3 °F (36.3 °C) (03/26/24 1124)  Pulse: 73 (03/26/24 1309)  Resp: (!) 21 (03/26/24 1309)  BP: 123/83 (03/26/24 1309)  SpO2: 99 % (03/26/24 1309) Vital Signs (24h Range):  Temp:  [97.3 °F (36.3 °C)] 97.3 °F (36.3 °C)  Pulse:  [71-95] 73  Resp:  [17-21] 21  SpO2:  [99 %] 99 %  BP: (114-143)/() 123/83     Weight: 57.2 kg (126 lb)  Body mass index is 19.73 kg/m².     Physical Exam  Vitals and nursing note reviewed.   Constitutional:       General: She is not in acute distress.     Appearance: Normal appearance.   HENT:      Head: Normocephalic and atraumatic.      Nose: Nose normal.   Eyes:      Conjunctiva/sclera: Conjunctivae normal.   Cardiovascular:      Rate and Rhythm: Normal rate and regular rhythm.      Pulses: Normal pulses.      Heart sounds: Normal heart sounds. No murmur heard.     No gallop.   Pulmonary:      Effort: Pulmonary effort is normal.      Breath sounds: Normal breath sounds. No wheezing, rhonchi or rales.   Abdominal:      General: Bowel sounds are normal. There is no distension.      Palpations: Abdomen is soft.      Tenderness: There is abdominal tenderness. There is no guarding.   Musculoskeletal:         General: No swelling or deformity. Normal range of motion.      Cervical back: Normal range of motion and neck supple.      Right lower leg: No edema.      Left lower leg: No edema.   Skin:     General: Skin is warm and dry.      Findings: No rash.   Neurological:      General: No focal deficit present.      Mental Status: She is alert and oriented to person, place, and time. Mental status is at baseline.      Gait: Gait normal.   Psychiatric:         Mood and Affect: Mood normal.         Thought Content: Thought content normal.         Judgment: Judgment normal.                Significant Labs: All pertinent labs within the past  24 hours have been reviewed.  Recent Lab Results         03/26/24  1345   03/26/24  1126        Phencyclidine Negative         Albumin/Globulin Ratio   1.2       Albumin   4.3       ALP   96       ALT   27       Amphetamines, Urine Negative         Amylase Level   432       AST   25       Barbituates, Urine Negative         Baso #   0.04       Basophil %   0.3       Benzodiazepine, Urine Negative         BILIRUBIN TOTAL   0.5       BUN   12.0       Calcium   9.3       Cannabinoids, Urine Negative         Chloride   108       CO2   21       Cocaine, Urine Negative         Creatinine   0.77       eGFR   >60       Eos #   0.15       Eos %   1.2       Fentanyl, Urine Negative         Globulin, Total   3.6       Glucose   116       Hematocrit   43.4       Hemoglobin   15.1       Immature Grans (Abs)   0.04       Immature Granulocytes   0.3       Lipase   835       Lymph #   4.06       LYMPH %   32.9       MCH   32.9       MCHC   34.8       MCV   94.6       MDMA, Urine Negative         Mono #   0.81       Mono %   6.6       MPV   8.3       Neut #   7.23       Neut %   58.7       nRBC   0.0       Opiates, Urine Negative         pH, Urine 5.0         Platelet Count   375       Potassium   3.0       hCG Qualitative, Urine Negative         PROTEIN TOTAL   7.9       RBC   4.59       RDW   13.2       Sodium   145       Specific [place], Urine Auto 1.015         WBC   12.33               Significant Imaging: I have reviewed all pertinent imaging results/findings within the past 24 hours.  Assessment/Plan:     * Idiopathic acute pancreatitis  Admit to inpt.  Aggressive IVF resuscitation. Received 2 L bolus in ED and then started on 250cc/hr. Will decrease rate to 175cc/hr after 3rd liter is completed.  PRN analgesics, antiemetics.  Reconciled home meds. PRN IV antihypertensives.  NPO.   AM labs.      GERD (gastroesophageal reflux disease)  Continue home pantoprazole , IV for now.      HTN (hypertension)  Chronic, controlled.  Latest blood pressure and vitals reviewed-     Temp:  [97.3 °F (36.3 °C)]   Pulse:  [71-95]   Resp:  [17-21]   BP: (114-143)/()   SpO2:  [99 %] .   Home meds for hypertension were reviewed and noted below.   Hypertension Medications               amLODIPine (NORVASC) 5 MG tablet Take 5 mg by mouth.    cloNIDine (CATAPRES) 0.2 MG tablet Take 0.2 mg by mouth every 8 (eight) hours as needed.    losartan (COZAAR) 25 MG tablet Take 25 mg by mouth once daily.            While in the hospital, will manage blood pressure as follows; Adjust home antihypertensive regimen as follows- hold while NPO.    Will utilize p.r.n. blood pressure medication only if patient's blood pressure greater than  160/95 .      VTE Risk Mitigation (From admission, onward)           Ordered     IP VTE LOW RISK PATIENT  Once         03/26/24 1429     Place SOBEIDA hose  Until discontinued         03/26/24 1429     Place sequential compression device  Until discontinued         03/26/24 1429                                    OZ VEGA DO  Department of Hospital Medicine  [facility]

## 2024-03-26 NOTE — SUBJECTIVE & OBJECTIVE
Past Medical History:   Diagnosis Date    GERD (gastroesophageal reflux disease)     Hypertension        History reviewed. No pertinent surgical history.    Review of patient's allergies indicates:  No Known Allergies    No current facility-administered medications on file prior to encounter.     Current Outpatient Medications on File Prior to Encounter   Medication Sig    amLODIPine (NORVASC) 5 MG tablet Take 5 mg by mouth.    baclofen (LIORESAL) 10 MG tablet Take 5 mg by mouth 3 (three) times daily as needed.    cloNIDine (CATAPRES) 0.2 MG tablet Take 0.2 mg by mouth every 8 (eight) hours as needed.    LORazepam (ATIVAN) 1 MG tablet Take 1 mg by mouth 2 (two) times daily as needed for Anxiety.    losartan (COZAAR) 25 MG tablet Take 25 mg by mouth once daily.    oseltamivir (TAMIFLU) 75 MG capsule TAKE 1 CAPSULE BY MOUTH TWICE A DAY FOR 5 DAYS    pantoprazole (PROTONIX) 40 MG tablet Take 40 mg by mouth once daily.    EScitalopram oxalate (LEXAPRO) 10 MG tablet Take 10 mg by mouth.    ibuprofen (ADVIL,MOTRIN) 800 MG tablet Take 800 mg by mouth.    ondansetron (ZOFRAN-ODT) 4 MG TbDL Take 8 mg by mouth every 8 (eight) hours as needed.    traMADoL (ULTRAM) 50 mg tablet Take 2 tablets (100 mg total) by mouth every 12 (twelve) hours as needed for Pain.    traZODone (DESYREL) 100 MG tablet Take 1 tablet (100 mg total) by mouth every evening.     Family History    None       Tobacco Use    Smoking status: Never    Smokeless tobacco: Never   Substance and Sexual Activity    Alcohol use: Never    Drug use: Never    Sexual activity: Not on file     Review of Systems   Constitutional:  Positive for activity change, appetite change and diaphoresis. Negative for fever.   Respiratory:  Negative for chest tightness and shortness of breath.    Cardiovascular:  Negative for chest pain and leg swelling.   Gastrointestinal:  Positive for abdominal pain, diarrhea, nausea and vomiting. Negative for abdominal distention and constipation.    Musculoskeletal:  Positive for arthralgias and gait problem.   Skin:  Negative for rash and wound.   Neurological:  Negative for dizziness, weakness and headaches.     Objective:     Vital Signs (Most Recent):  Temp: 97.3 °F (36.3 °C) (03/26/24 1124)  Pulse: 73 (03/26/24 1309)  Resp: (!) 21 (03/26/24 1309)  BP: 123/83 (03/26/24 1309)  SpO2: 99 % (03/26/24 1309) Vital Signs (24h Range):  Temp:  [97.3 °F (36.3 °C)] 97.3 °F (36.3 °C)  Pulse:  [71-95] 73  Resp:  [17-21] 21  SpO2:  [99 %] 99 %  BP: (114-143)/() 123/83     Weight: 57.2 kg (126 lb)  Body mass index is 19.73 kg/m².     Physical Exam  Vitals and nursing note reviewed.   Constitutional:       General: She is not in acute distress.     Appearance: Normal appearance.   HENT:      Head: Normocephalic and atraumatic.      Nose: Nose normal.   Eyes:      Conjunctiva/sclera: Conjunctivae normal.   Cardiovascular:      Rate and Rhythm: Normal rate and regular rhythm.      Pulses: Normal pulses.      Heart sounds: Normal heart sounds. No murmur heard.     No gallop.   Pulmonary:      Effort: Pulmonary effort is normal.      Breath sounds: Normal breath sounds. No wheezing, rhonchi or rales.   Abdominal:      General: Bowel sounds are normal. There is no distension.      Palpations: Abdomen is soft.      Tenderness: There is abdominal tenderness. There is no guarding.   Musculoskeletal:         General: No swelling or deformity. Normal range of motion.      Cervical back: Normal range of motion and neck supple.      Right lower leg: No edema.      Left lower leg: No edema.   Skin:     General: Skin is warm and dry.      Findings: No rash.   Neurological:      General: No focal deficit present.      Mental Status: She is alert and oriented to person, place, and time. Mental status is at baseline.      Gait: Gait normal.   Psychiatric:         Mood and Affect: Mood normal.         Thought Content: Thought content normal.         Judgment: Judgment normal.                 Significant Labs: All pertinent labs within the past 24 hours have been reviewed.  Recent Lab Results         03/26/24  1345   03/26/24  1126        Phencyclidine Negative         Albumin/Globulin Ratio   1.2       Albumin   4.3       ALP   96       ALT   27       Amphetamines, Urine Negative         Amylase Level   432       AST   25       Barbituates, Urine Negative         Baso #   0.04       Basophil %   0.3       Benzodiazepine, Urine Negative         BILIRUBIN TOTAL   0.5       BUN   12.0       Calcium   9.3       Cannabinoids, Urine Negative         Chloride   108       CO2   21       Cocaine, Urine Negative         Creatinine   0.77       eGFR   >60       Eos #   0.15       Eos %   1.2       Fentanyl, Urine Negative         Globulin, Total   3.6       Glucose   116       Hematocrit   43.4       Hemoglobin   15.1       Immature Grans (Abs)   0.04       Immature Granulocytes   0.3       Lipase   835       Lymph #   4.06       LYMPH %   32.9       MCH   32.9       MCHC   34.8       MCV   94.6       MDMA, Urine Negative         Mono #   0.81       Mono %   6.6       MPV   8.3       Neut #   7.23       Neut %   58.7       nRBC   0.0       Opiates, Urine Negative         pH, Urine 5.0         Platelet Count   375       Potassium   3.0       hCG Qualitative, Urine Negative         PROTEIN TOTAL   7.9       RBC   4.59       RDW   13.2       Sodium   145       Specific [place], Urine Auto 1.015         WBC   12.33               Significant Imaging: I have reviewed all pertinent imaging results/findings within the past 24 hours.

## 2024-03-26 NOTE — PLAN OF CARE
03/26/24 1543   Discharge Assessment   Assessment Type Discharge Planning Assessment   Confirmed/corrected address, phone number and insurance Yes   Source of Information family   Communicated ROSA with patient/caregiver Yes   Reason For Admission Pacreatitis   People in Home child(paty), adult   Facility Arrived From: Home   Do you expect to return to your current living situation? Yes   Do you have help at home or someone to help you manage your care at home? Yes   Who are your caregiver(s) and their phone number(s)? Daughter [phone]   Prior to hospitilization cognitive status: Alert/Oriented   Current cognitive status: Alert/Oriented   Walking or Climbing Stairs Difficulty yes   Walking or Climbing Stairs stair climbing difficulty, assistance 1 person;stair climbing difficulty, requires equipment   Dressing/Bathing Difficulty yes   Dressing/Bathing bathing difficulty, assistance 1 person;dressing difficulty, assistance 1 person   Equipment Currently Used at Home walker, rolling;cane, straight   Readmission within 30 days? No   Do you currently have service(s) that help you manage your care at home? No   Do you take prescription medications? Yes   Do you have prescription coverage? Yes   Coverage Medicaid   Do you have any problems affording any of your prescribed medications? No   Is the patient taking medications as prescribed? yes   Who is going to help you get home at discharge? Daughter [phone]   How do you get to doctors appointments? family or friend will provide   Are you on dialysis? No   Do you take coumadin? No   Discharge Plan A Home with family   DME Needed Upon Discharge  none   Discharge Plan discussed with: Patient   Transition of Care Barriers None   Physical Activity   On average, how many days per week do you engage in moderate to strenuous exercise (like a brisk walk)? 0 days   On average, how many minutes do you engage in exercise at this level? 0 min   Housing Stability   In the  last 12 months, was there a time when you were not able to pay the mortgage or rent on time? N   In the last 12 months, was there a time when you did not have a steady place to sleep or slept in a shelter (including now)? N   Transportation Needs   In the past 12 months, has lack of transportation kept you from medical appointments or from getting medications? no   In the past 12 months, has lack of transportation kept you from meetings, work, or from getting things needed for daily living? No   Food Insecurity   Within the past 12 months, you worried that your food would run out before you got the money to buy more. Never true   Within the past 12 months, the food you bought just didn't last and you didn't have money to get more. Never true   Stress   Do you feel stress - tense, restless, nervous, or anxious, or unable to sleep at night because your mind is troubled all the time - these days? Only a littl   Alcohol Use   Q1: How often do you have a drink containing alcohol? Never   Q2: How many drinks containing alcohol do you have on a typical day when you are drinking? None   Q3: How often do you have six or more drinks on one occasion? Never     Patient received pain meds for pain and was unable to answer all questions. Daughter will be back tomorrow, she is her caregiver and will be able to answer questions more in depth. Will defer to Dr. Reddy for further discharge plan.

## 2024-03-26 NOTE — PLAN OF CARE
Problem: Adult Inpatient Plan of Care  Goal: Plan of Care Review  Outcome: Ongoing, Progressing  Goal: Patient-Specific Goal (Individualized)  Outcome: Ongoing, Progressing  Goal: Absence of Hospital-Acquired Illness or Injury  Outcome: Ongoing, Progressing  Goal: Optimal Comfort and Wellbeing  Outcome: Ongoing, Progressing  Goal: Readiness for Transition of Care  Outcome: Ongoing, Progressing     Problem: Fall Injury Risk  Goal: Absence of Fall and Fall-Related Injury  Outcome: Ongoing, Progressing     Problem: Pain Acute  Goal: Acceptable Pain Control and Functional Ability  Outcome: Ongoing, Progressing     Problem: Fatigue  Goal: Improved Activity Tolerance  Outcome: Ongoing, Progressing     Problem: Infection  Goal: Absence of Infection Signs and Symptoms  Outcome: Ongoing, Progressing     Problem: Fluid Imbalance (Pancreatitis)  Goal: Fluid Balance  Outcome: Ongoing, Progressing     Problem: Infection (Pancreatitis)  Goal: Infection Symptom Resolution  Outcome: Ongoing, Progressing     Problem: Nutrition Impaired (Pancreatitis)  Goal: Optimal Nutrition Intake  Outcome: Ongoing, Progressing     Problem: Pain (Pancreatitis)  Goal: Acceptable Pain Control  Outcome: Ongoing, Progressing     Problem: Respiratory Compromise (Pancreatitis)  Goal: Effective Oxygenation and Ventilation  Outcome: Ongoing, Progressing

## 2024-03-26 NOTE — NURSING
Nurses Note -- 4 Eyes      3/26/2024   1445    Skin assessed during: Admit      [x] No Altered Skin Integrity Present    [x]Prevention Measures Documented      [] Yes- Altered Skin Integrity Present or Discovered   [] LDA Added if Not in Epic (Describe Wound)   [] New Altered Skin Integrity was Present on Admit and Documented in LDA   [] Wound Image Taken    Wound Care Consulted? No    Attending Nurse:  Kinga Sellers RN/Staff Member:  Lashay

## 2024-03-26 NOTE — HPI
"52yo female presented to the ED 3/26/24 with complaints of LUQ and epigastric pain and N/V/D since yesterday. PMH HTN, GERD, and cholecystectomy 3-4 months ago. She had diarrhea yesterday. None today. Had a BM in the ED. ED dr montes de oca order C diff PCR. Vitals significant for HTN. All other vitals unremarkable. Labs significant for amylase 432, lipase 835, WBC 12.33, K 3.0. BUN, Cr, H/H all within normal limits. Chest xr and abd xr unremarkable. CT abd/pelvis revealed "Findings suggestive of acute pancreatitis with peripancreatic inflammatory changes and fluid. No drainable fluid collection or free air. Diffuse hepatic steatosis."  Pt was given 2 Liter NS bolus is ED and then started on NS 250cc/hr, along with zofran, meperidine, Kcl. Hospital medicine was consulted for admission for continue treatment.    Upon examination, pt endorses epigastric and ruq abd pain, nausea. States pain is 20/10. States pain and nausea meds in ER did not help. Endorses chronic right knee pain for which she was scheduled to have a knee surgery for today. Due to her knee pain, she falls at home often. Denies any confusion, dizziness, SOB, or chest pain.Quit smoking cigarettes 10 years ago.  " right 3rd digit swelling

## 2024-03-26 NOTE — ASSESSMENT & PLAN NOTE
Chronic, controlled. Latest blood pressure and vitals reviewed-     Temp:  [97.3 °F (36.3 °C)]   Pulse:  [71-95]   Resp:  [17-21]   BP: (114-143)/()   SpO2:  [99 %] .   Home meds for hypertension were reviewed and noted below.   Hypertension Medications               amLODIPine (NORVASC) 5 MG tablet Take 5 mg by mouth.    cloNIDine (CATAPRES) 0.2 MG tablet Take 0.2 mg by mouth every 8 (eight) hours as needed.    losartan (COZAAR) 25 MG tablet Take 25 mg by mouth once daily.            While in the hospital, will manage blood pressure as follows; Adjust home antihypertensive regimen as follows- hold while NPO.    Will utilize p.r.n. blood pressure medication only if patient's blood pressure greater than  160/95 .

## 2024-03-26 NOTE — ASSESSMENT & PLAN NOTE
Admit to inpt.  Aggressive IVF resuscitation. Received 2 L bolus in ED and then started on 250cc/hr. Will decrease rate to 175cc/hr after 3rd liter is completed.  PRN analgesics, antiemetics.  Reconciled home meds. PRN IV antihypertensives.  NPO.   AM labs.

## 2024-03-27 PROBLEM — E44.0 MODERATE PROTEIN-CALORIE MALNUTRITION: Status: ACTIVE | Noted: 2024-03-27

## 2024-03-27 LAB
ALBUMIN SERPL-MCNC: 3.3 G/DL (ref 3.5–5)
ALBUMIN/GLOB SERPL: 1.1 RATIO (ref 1.1–2)
ALP SERPL-CCNC: 69 UNIT/L (ref 40–150)
ALT SERPL-CCNC: 19 UNIT/L (ref 0–55)
ANION GAP SERPL CALC-SCNC: 8 MEQ/L
APPEARANCE UR: CLEAR
AST SERPL-CCNC: 20 UNIT/L (ref 5–34)
BACTERIA #/AREA URNS AUTO: NORMAL /HPF
BASOPHILS # BLD AUTO: 0.03 X10(3)/MCL
BASOPHILS NFR BLD AUTO: 0.2 %
BILIRUB SERPL-MCNC: 0.6 MG/DL
BILIRUB UR QL STRIP.AUTO: NEGATIVE
BUN SERPL-MCNC: 6 MG/DL (ref 9.8–20.1)
BUN SERPL-MCNC: 9 MG/DL (ref 9.8–20.1)
CALCIUM SERPL-MCNC: 8.1 MG/DL (ref 8.4–10.2)
CALCIUM SERPL-MCNC: 8.2 MG/DL (ref 8.4–10.2)
CHLORIDE SERPL-SCNC: 113 MMOL/L (ref 98–107)
CHLORIDE SERPL-SCNC: 116 MMOL/L (ref 98–107)
CO2 SERPL-SCNC: 16 MMOL/L (ref 22–29)
CO2 SERPL-SCNC: 20 MMOL/L (ref 22–29)
COLOR UR AUTO: YELLOW
CREAT SERPL-MCNC: 0.64 MG/DL (ref 0.55–1.02)
CREAT SERPL-MCNC: 0.65 MG/DL (ref 0.55–1.02)
CREAT/UREA NIT SERPL: 9
EOSINOPHIL # BLD AUTO: 0 X10(3)/MCL (ref 0–0.9)
EOSINOPHIL NFR BLD AUTO: 0 %
ERYTHROCYTE [DISTWIDTH] IN BLOOD BY AUTOMATED COUNT: 13.2 % (ref 11.5–17)
GFR SERPLBLD CREATININE-BSD FMLA CKD-EPI: >60 MLS/MIN/1.73/M2
GFR SERPLBLD CREATININE-BSD FMLA CKD-EPI: >60 MLS/MIN/1.73/M2
GLOBULIN SER-MCNC: 3.1 GM/DL (ref 2.4–3.5)
GLUCOSE SERPL-MCNC: 101 MG/DL (ref 74–100)
GLUCOSE SERPL-MCNC: 135 MG/DL (ref 74–100)
GLUCOSE UR QL STRIP.AUTO: NEGATIVE
HCT VFR BLD AUTO: 40 % (ref 37–47)
HGB BLD-MCNC: 13.8 G/DL (ref 12–16)
IMM GRANULOCYTES # BLD AUTO: 0.1 X10(3)/MCL (ref 0–0.04)
IMM GRANULOCYTES NFR BLD AUTO: 0.7 %
KETONES UR QL STRIP.AUTO: ABNORMAL
LEUKOCYTE ESTERASE UR QL STRIP.AUTO: NEGATIVE
LYMPHOCYTES # BLD AUTO: 0.72 X10(3)/MCL (ref 0.6–4.6)
LYMPHOCYTES NFR BLD AUTO: 4.7 %
MCH RBC QN AUTO: 33.1 PG (ref 27–31)
MCHC RBC AUTO-ENTMCNC: 34.5 G/DL (ref 33–36)
MCV RBC AUTO: 95.9 FL (ref 80–94)
MONOCYTES # BLD AUTO: 0.82 X10(3)/MCL (ref 0.1–1.3)
MONOCYTES NFR BLD AUTO: 5.4 %
NEUTROPHILS # BLD AUTO: 13.55 X10(3)/MCL (ref 2.1–9.2)
NEUTROPHILS NFR BLD AUTO: 89 %
NITRITE UR QL STRIP.AUTO: NEGATIVE
NRBC BLD AUTO-RTO: 0 %
PH UR STRIP.AUTO: 5.5 [PH]
PLATELET # BLD AUTO: 266 X10(3)/MCL (ref 130–400)
PMV BLD AUTO: 8.5 FL (ref 7.4–10.4)
POTASSIUM SERPL-SCNC: 3.6 MMOL/L (ref 3.5–5.1)
POTASSIUM SERPL-SCNC: 3.9 MMOL/L (ref 3.5–5.1)
PROT SERPL-MCNC: 6.4 GM/DL (ref 6.4–8.3)
PROT UR QL STRIP.AUTO: NEGATIVE
RBC # BLD AUTO: 4.17 X10(6)/MCL (ref 4.2–5.4)
RBC #/AREA URNS AUTO: NORMAL /HPF
RBC UR QL AUTO: ABNORMAL
SODIUM SERPL-SCNC: 141 MMOL/L (ref 136–145)
SODIUM SERPL-SCNC: 144 MMOL/L (ref 136–145)
SP GR UR STRIP.AUTO: 1.01 (ref 1–1.03)
SQUAMOUS #/AREA URNS AUTO: NORMAL /HPF
UROBILINOGEN UR STRIP-ACNC: 0.2
WBC # SPEC AUTO: 15.22 X10(3)/MCL (ref 4.5–11.5)
WBC #/AREA URNS AUTO: NORMAL /HPF

## 2024-03-27 PROCEDURE — 80053 COMPREHEN METABOLIC PANEL: CPT | Performed by: INTERNAL MEDICINE

## 2024-03-27 PROCEDURE — 87040 BLOOD CULTURE FOR BACTERIA: CPT | Performed by: INTERNAL MEDICINE

## 2024-03-27 PROCEDURE — 85025 COMPLETE CBC W/AUTO DIFF WBC: CPT | Performed by: INTERNAL MEDICINE

## 2024-03-27 PROCEDURE — 25000003 PHARM REV CODE 250: Performed by: INTERNAL MEDICINE

## 2024-03-27 PROCEDURE — 63600175 PHARM REV CODE 636 W HCPCS: Performed by: INTERNAL MEDICINE

## 2024-03-27 PROCEDURE — 11000001 HC ACUTE MED/SURG PRIVATE ROOM

## 2024-03-27 PROCEDURE — C9113 INJ PANTOPRAZOLE SODIUM, VIA: HCPCS | Performed by: INTERNAL MEDICINE

## 2024-03-27 PROCEDURE — 94761 N-INVAS EAR/PLS OXIMETRY MLT: CPT

## 2024-03-27 RX ORDER — LABETALOL HCL 20 MG/4 ML
10 SYRINGE (ML) INTRAVENOUS EVERY 6 HOURS PRN
Status: DISCONTINUED | OUTPATIENT
Start: 2024-03-27 | End: 2024-03-29 | Stop reason: HOSPADM

## 2024-03-27 RX ORDER — AMLODIPINE BESYLATE 5 MG/1
5 TABLET ORAL DAILY
Status: DISCONTINUED | OUTPATIENT
Start: 2024-03-27 | End: 2024-03-29 | Stop reason: HOSPADM

## 2024-03-27 RX ORDER — DEXTROSE MONOHYDRATE, SODIUM CHLORIDE, AND POTASSIUM CHLORIDE 50; 1.49; 4.5 G/1000ML; G/1000ML; G/1000ML
125 INJECTION, SOLUTION INTRAVENOUS CONTINUOUS
Status: DISCONTINUED | OUTPATIENT
Start: 2024-03-27 | End: 2024-03-28

## 2024-03-27 RX ORDER — LOSARTAN POTASSIUM 25 MG/1
25 TABLET ORAL DAILY
Status: DISCONTINUED | OUTPATIENT
Start: 2024-03-27 | End: 2024-03-29 | Stop reason: HOSPADM

## 2024-03-27 RX ADMIN — PANTOPRAZOLE SODIUM 40 MG: 40 INJECTION, POWDER, LYOPHILIZED, FOR SOLUTION INTRAVENOUS at 08:03

## 2024-03-27 RX ADMIN — HYDROMORPHONE HYDROCHLORIDE 1 MG: 2 INJECTION, SOLUTION INTRAMUSCULAR; INTRAVENOUS; SUBCUTANEOUS at 04:03

## 2024-03-27 RX ADMIN — POTASSIUM CHLORIDE, DEXTROSE MONOHYDRATE AND SODIUM CHLORIDE 125 ML/HR: 150; 5; 450 INJECTION, SOLUTION INTRAVENOUS at 05:03

## 2024-03-27 RX ADMIN — LORAZEPAM 1 MG: 2 INJECTION INTRAMUSCULAR; INTRAVENOUS at 01:03

## 2024-03-27 RX ADMIN — LABETALOL HYDROCHLORIDE 10 MG: 5 INJECTION, SOLUTION INTRAVENOUS at 11:03

## 2024-03-27 RX ADMIN — POTASSIUM CHLORIDE, DEXTROSE MONOHYDRATE AND SODIUM CHLORIDE 125 ML/HR: 150; 5; 450 INJECTION, SOLUTION INTRAVENOUS at 11:03

## 2024-03-27 RX ADMIN — LOSARTAN POTASSIUM 25 MG: 25 TABLET, FILM COATED ORAL at 02:03

## 2024-03-27 RX ADMIN — AMLODIPINE BESYLATE 5 MG: 5 TABLET ORAL at 02:03

## 2024-03-27 RX ADMIN — HYDROMORPHONE HYDROCHLORIDE 1 MG: 2 INJECTION, SOLUTION INTRAMUSCULAR; INTRAVENOUS; SUBCUTANEOUS at 07:03

## 2024-03-27 RX ADMIN — SODIUM CHLORIDE: 9 INJECTION, SOLUTION INTRAVENOUS at 03:03

## 2024-03-27 RX ADMIN — HYDRALAZINE HYDROCHLORIDE 5 MG: 20 INJECTION INTRAMUSCULAR; INTRAVENOUS at 08:03

## 2024-03-27 RX ADMIN — SODIUM CHLORIDE: 9 INJECTION, SOLUTION INTRAVENOUS at 09:03

## 2024-03-27 RX ADMIN — ACETAMINOPHEN 650 MG: 650 SUPPOSITORY RECTAL at 11:03

## 2024-03-27 RX ADMIN — PROMETHAZINE HYDROCHLORIDE 12.5 MG: 25 INJECTION INTRAMUSCULAR; INTRAVENOUS at 02:03

## 2024-03-27 RX ADMIN — HYDRALAZINE HYDROCHLORIDE 5 MG: 20 INJECTION INTRAMUSCULAR; INTRAVENOUS at 04:03

## 2024-03-27 RX ADMIN — MEPERIDINE HYDROCHLORIDE 12.5 MG: 25 INJECTION INTRAMUSCULAR; INTRAVENOUS; SUBCUTANEOUS at 08:03

## 2024-03-27 RX ADMIN — ONDANSETRON 4 MG: 2 INJECTION INTRAMUSCULAR; INTRAVENOUS at 10:03

## 2024-03-27 NOTE — SUBJECTIVE & OBJECTIVE
Past Medical History:   Diagnosis Date    GERD (gastroesophageal reflux disease)     Hypertension        History reviewed. No pertinent surgical history.    Review of patient's allergies indicates:  No Known Allergies    No current facility-administered medications on file prior to encounter.     Current Outpatient Medications on File Prior to Encounter   Medication Sig    amLODIPine (NORVASC) 5 MG tablet Take 5 mg by mouth.    baclofen (LIORESAL) 10 MG tablet Take 5 mg by mouth 3 (three) times daily as needed.    cloNIDine (CATAPRES) 0.2 MG tablet Take 0.2 mg by mouth every 8 (eight) hours as needed.    LORazepam (ATIVAN) 1 MG tablet Take 1 mg by mouth 2 (two) times daily as needed for Anxiety.    losartan (COZAAR) 25 MG tablet Take 25 mg by mouth once daily.    oseltamivir (TAMIFLU) 75 MG capsule TAKE 1 CAPSULE BY MOUTH TWICE A DAY FOR 5 DAYS    pantoprazole (PROTONIX) 40 MG tablet Take 40 mg by mouth once daily.    EScitalopram oxalate (LEXAPRO) 10 MG tablet Take 10 mg by mouth.    ibuprofen (ADVIL,MOTRIN) 800 MG tablet Take 800 mg by mouth.    ondansetron (ZOFRAN-ODT) 4 MG TbDL Take 8 mg by mouth every 8 (eight) hours as needed.    traMADoL (ULTRAM) 50 mg tablet Take 2 tablets (100 mg total) by mouth every 12 (twelve) hours as needed for Pain.    traZODone (DESYREL) 100 MG tablet Take 1 tablet (100 mg total) by mouth every evening.     Family History    None       Tobacco Use    Smoking status: Never    Smokeless tobacco: Never   Substance and Sexual Activity    Alcohol use: Never    Drug use: Never    Sexual activity: Not on file     Review of Systems   Constitutional:  Positive for activity change and appetite change. Negative for diaphoresis and fever.   Respiratory:  Negative for chest tightness and shortness of breath.    Cardiovascular:  Negative for chest pain and leg swelling.   Gastrointestinal:  Positive for abdominal pain, nausea and vomiting. Negative for abdominal distention, constipation and  diarrhea.   Musculoskeletal:  Positive for arthralgias and gait problem.   Skin:  Negative for rash and wound.   Neurological:  Negative for dizziness, weakness and headaches.     Objective:     Vital Signs (Most Recent):  Temp:  (headache) (03/27/24 1134)  Pulse: 98 (03/27/24 0934)  Resp: 18 (03/27/24 0934)  BP: (!) 158/91 (Dr Reddy at bedside.  Informed of V/S with prn hydralazine) (03/27/24 0934)  SpO2: 98 % (03/27/24 0934) Vital Signs (24h Range):  Temp:  [97.9 °F (36.6 °C)-98.8 °F (37.1 °C)] 98.8 °F (37.1 °C)  Pulse:  [71-98] 98  Resp:  [16-21] 18  SpO2:  [96 %-100 %] 98 %  BP: (120-183)/() 158/91     Weight: 57.1 kg (125 lb 14.1 oz)  Body mass index is 23.79 kg/m².     Physical Exam  Vitals and nursing note reviewed.   Constitutional:       General: She is not in acute distress.     Appearance: Normal appearance.   HENT:      Head: Normocephalic and atraumatic.      Nose: Nose normal.   Eyes:      Conjunctiva/sclera: Conjunctivae normal.   Cardiovascular:      Rate and Rhythm: Normal rate and regular rhythm.      Pulses: Normal pulses.      Heart sounds: Normal heart sounds. No murmur heard.     No gallop.   Pulmonary:      Effort: Pulmonary effort is normal.      Breath sounds: Normal breath sounds. No wheezing, rhonchi or rales.   Abdominal:      General: Bowel sounds are normal. There is no distension.      Palpations: Abdomen is soft.      Tenderness: There is abdominal tenderness. There is no guarding.   Musculoskeletal:         General: No swelling or deformity. Normal range of motion.      Cervical back: Normal range of motion and neck supple.      Right lower leg: No edema.      Left lower leg: No edema.   Skin:     General: Skin is warm and dry.      Findings: No rash.   Neurological:      General: No focal deficit present.      Mental Status: She is alert and oriented to person, place, and time. Mental status is at baseline.      Gait: Gait normal.   Psychiatric:         Mood and Affect: Mood  normal.         Thought Content: Thought content normal.         Judgment: Judgment normal.                Significant Labs: All pertinent labs within the past 24 hours have been reviewed.  Recent Lab Results         03/27/24  0432   03/26/24  1345        Phencyclidine   Negative       Albumin/Globulin Ratio 1.1         Albumin 3.3         ALP 69         ALT 19         Amphetamines, Urine   Negative       Appearance, UA   Clear       AST 20         Bacteria, UA   Rare       Barbituates, Urine   Negative       Baso # 0.03         Basophil % 0.2         Benzodiazepine, Urine   Negative       BILIRUBIN TOTAL 0.6         Bilirubin, UA   Negative       BUN 9.0         Calcium 8.1         Cannabinoids, Urine   Negative       Chloride 116         CO2 16         Cocaine, Urine   Negative       Color, UA   Yellow       Creatinine 0.65         eGFR >60         Eos # 0.00         Eos % 0.0         Fentanyl, Urine   Negative       Globulin, Total 3.1         Glucose 101         Glucose, UA   Negative       Hematocrit 40.0         Hemoglobin 13.8         Immature Grans (Abs) 0.10         Immature Granulocytes 0.7         Ketones, UA   1+       Leukocyte Esterase, UA   Negative       Lymph # 0.72         LYMPH % 4.7         MCH 33.1         MCHC 34.5         MCV 95.9         MDMA, Urine   Negative       Mono # 0.82         Mono % 5.4         MPV 8.5         Neut # 13.55         Neut % 89.0         NITRITE UA   Negative       nRBC 0.0         Blood, UA   Trace-Intact       Opiates, Urine   Negative       pH, UA   5.5       pH, Urine   5.0       Platelet Count 266         Potassium 3.9         hCG Qualitative, Urine   Negative       PROTEIN TOTAL 6.4         Protein, UA   Negative       RBC 4.17         RBC, UA   0-2       RDW 13.2         Sodium 144         Specific Gravity,UA   1.015       Specific Gravity, Urine Auto   1.015       Squam Epithel, UA   Rare       Urobilinogen, UA   0.2       WBC, UA   0-2       WBC 15.22                  Significant Imaging: I have reviewed all pertinent imaging results/findings within the past 24 hours.

## 2024-03-27 NOTE — PROGRESS NOTES
Inpatient Nutrition Assessment    Admit Date: 3/26/2024   Total duration of encounter: 1 day   Patient Age: 53 y.o.    Nutrition Recommendation/Prescription     If unable to upgrade diet and tolerate, rec' PPN of Clinimix E 4.25/5 @ 85 ml/hr to provide  694 kcal/d (meets 41% of est needs)  87 gm amino acids/d (meets 100% of est needs)  102 gm dextrose/d (1.2 Glucose Infusion Rate)  2040 ml/d (meets 119% of fluid needs)  Daily weights  Monitor BMP daily and replace lytes as needed.  When able to advance diet, advance as tolerated to Low-Fat/[place]    Communication of Recommendations: reviewed with provider and reviewed with nurse    Nutrition Assessment     Malnutrition Assessment/Nutrition-Focused Physical Exam    Malnutrition Context: chronic illness (03/27/24 1331)  Malnutrition Level: moderate (03/27/24 1331)  Energy Intake (Malnutrition): less than 75% for greater than or equal to 3 months (03/27/24 1331)  Weight Loss (Malnutrition): greater than 10% in 6 months (03/27/24 1331)  Subcutaneous Fat (Malnutrition): moderate depletion (03/27/24 1331)  Orbital Region (Subcutaneous Fat Loss): mild depletion  Upper Arm Region (Subcutaneous Fat Loss): mild depletion     Muscle Mass (Malnutrition): moderate depletion (03/27/24 1331)  Ruth Region (Muscle Loss): mild depletion  Clavicle Bone Region (Muscle Loss): mild depletion                             A minimum of two characteristics is recommended for diagnosis of either severe or non-severe malnutrition.    Chart Review    Reason Seen: continuous nutrition monitoring    Malnutrition Screening Tool Results   Have you recently lost weight without trying?: No  Have you been eating poorly because of a decreased appetite?: No   MST Score: 0   Diagnosis:  Idiopathic acute pancreatitis    Relevant Medical History:   HTN, GERD    Scheduled Medications:  pantoprazole, 40 mg, Daily    Continuous Infusions:  dextrose 5 % and 0.45 % NaCl with KCl 20 mEq, Last Rate: 125 mL/hr  "(24 1131)    PRN Medications: acetaminophen, hydrALAZINE, HYDROmorphone, labetalol, lorazepam, meperidine, ondansetron, promethazine (PHENERGAN) 12.5 mg in dextrose 5 % (D5W) 50 mL IVPB, sodium chloride 0.9%    Calorie Containing IV Medications: no significant kcals from medications at this time    Recent Labs   Lab 24  1126 24  0432    144   K 3.0* 3.9   CALCIUM 9.3 8.1*   CHLORIDE 108* 116*   CO2 21* 16*   BUN 12.0 9.0*   CREATININE 0.77 0.65   EGFRNORACEVR >60 >60   GLUCOSE 116* 101*   BILITOT 0.5 0.6   ALKPHOS 96 69   ALT 27 19   AST 25 20   ALBUMIN 4.3 3.3*   LIPASE 835*  --    AMYLASE 432*  --    WBC 12.33* 15.22*   HGB 15.1 13.8   HCT 43.4 40.0     Nutrition Orders:  Diet NPO      Appetite/Oral Intake: NPO/NPO  Factors Affecting Nutritional Intake: abdominal pain, nausea, NPO, and vomiting  Food/Roman Catholic/Cultural Preferences: none reported  Food Allergies: none reported  Last Bowel Movement: (P) 24  Wound(s):  Intact    Comments    (3/27) Met with pt. Pt with abdominal pain, nausea and vomiting during interview. Currently NPO. Pt stated symptoms started 2 days ago. Denied diarrhea/constipation today. No difficulties chewing or swallowing. Pt reported her UBW 6 months ago was 165#. Reported wt loss was due to gallbladder issues. Stated her intake at home was not great PTA over past few months. Med/labs reviewed.     Anthropometrics    Height: 5' 1" (154.9 cm), Height Method: Stated  Last Weight: 57.1 kg (125 lb 14.1 oz) (24 1430), Weight Method: Bed Scale  BMI (Calculated): 23.8  BMI Classification: normal (BMI 18.5-24.9)     Ideal Body Weight (IBW), Female: 105 lb     % Ideal Body Weight, Female (lb): 119.89 %                    Usual Body Weight (UBW), k kg  % Usual Body Weight: 76.29     Usual Weight Provided By: patient    Wt Readings from Last 5 Encounters:   24 57.1 kg (125 lb 14.1 oz)   23 67.1 kg (148 lb)   22 63 kg (138 lb 14.2 oz)   21 " 62.4 kg (137 lb 9.1 oz)     Weight Change(s) Since Admission: new admit  Wt Readings from Last 1 Encounters:   03/26/24 1430 57.1 kg (125 lb 14.1 oz)   03/26/24 1124 57.2 kg (126 lb)   Admit Weight: 57.2 kg (126 lb) (03/26/24 1124), Weight Method: Bed Scale    Estimated Needs    Weight Used For Calorie Calculations: 57.1 kg (125 lb 14.1 oz)  Energy Calorie Requirements (kcal): 1713 kcal (30 kcal/kg)  Energy Need Method: Kcal/kg  Weight Used For Protein Calculations: 57.1 kg (125 lb 14.1 oz)  Protein Requirements: 86 gm (1.5 gm/kg)  Fluid Requirements (mL): 1713 ml (1 ml/kcal)    Enteral Nutrition     Patient not receiving enteral nutrition at this time.    Parenteral Nutrition     Patient not receiving parenteral nutrition support at this time.    Evaluation of Received Nutrient Intake    Calories: not meeting estimated needs  Protein: not meeting estimated needs    Patient Education     Not applicable.    Nutrition Diagnosis     PES: Altered GI function related to acute illness as evidenced by dx idiopathic acute pancreatitis (Lipase 835 H & Amylase 432 H). (new)     PES: Moderate chronic disease or condition related malnutrition related to inability to consume sufficient nutrients as evidenced by less than 75% needs met for greater than or equal to 1 month, moderate fat depletion, moderate muscle depletion, and greater than 10% weight loss in 6 months. (new)    Nutrition Interventions     Intervention(s): general/healthful diet, modified rate of parenteral nutrition, and collaboration with other providers    Goal: Meet greater than 80% of nutritional needs by follow-up. (new)  Goal: Maintain weight throughout hospitalization. (new)    Nutrition Goals & Monitoring     Dietitian will monitor: energy intake, weight, electrolyte/renal panel, glucose/endocrine profile, and gastrointestinal profile    Nutrition Risk/Follow-Up: high (follow-up in 1-4 days)   Please consult if re-assessment needed sooner.

## 2024-03-27 NOTE — ASSESSMENT & PLAN NOTE
Chronic, controlled. Latest blood pressure and vitals reviewed-     Temp:  [97.9 °F (36.6 °C)-98.8 °F (37.1 °C)]   Pulse:  [71-98]   Resp:  [16-21]   BP: (120-183)/()   SpO2:  [96 %-100 %] .   Home meds for hypertension were reviewed and noted below.   Hypertension Medications               amLODIPine (NORVASC) 5 MG tablet Take 5 mg by mouth.    cloNIDine (CATAPRES) 0.2 MG tablet Take 0.2 mg by mouth every 8 (eight) hours as needed.    losartan (COZAAR) 25 MG tablet Take 25 mg by mouth once daily.            While in the hospital, will manage blood pressure as follows; Adjust home antihypertensive regimen as follows- hold while NPO.    Will utilize p.r.n. blood pressure medication only if patient's blood pressure greater than  155/90 .

## 2024-03-27 NOTE — ASSESSMENT & PLAN NOTE
Patient meets ASPEN criteria for moderate malnutrition of chronic illness per RD assessment as evidenced by:  Energy Intake (Malnutrition): less than 75% for greater than or equal to 3 months  Weight Loss (Malnutrition): greater than 10% in 6 months  Subcutaneous Fat (Malnutrition): moderate depletion  Muscle Mass (Malnutrition): moderate depletion           A minimum of two characteristics is recommended for diagnosis of either severe or non-severe malnutrition.

## 2024-03-27 NOTE — PLAN OF CARE
03/27/24 0849   Discharge Assessment   Assessment Type Discharge Planning Brief Assessment   Confirmed/corrected address, phone number and insurance Yes   Confirmed Demographics Correct on Facesheet   Source of Information patient   Communicated ROSA with patient/caregiver Yes   Home Accessibility not wheelchair accessible   Discharge Plan A Home Health;Home   Discharge Plan discussed with: Patient   Transition of Care Barriers None   Social Connections   In a typical week, how many times do you talk on the phone with family, friends, or neighbors? Three   How often do you get together with friends or relatives? Three times   How often do you attend Worship or Sabianism services? Pt Declined   Do you belong to any clubs or organizations such as Worship groups, unions, fraternal or athletic groups, or school groups? Pt Declined   How often do you attend meetings of the clubs or organizations you belong to? Pt Declined

## 2024-03-27 NOTE — ASSESSMENT & PLAN NOTE
Admit to inpt.  Aggressive IVF resuscitation. Received 2 L bolus in ED and then started on 250cc/hr. Will decrease rate to 175cc/hr after 3rd liter is completed.  PRN analgesics, antiemetics.  Reconciled home meds. PRN IV antihypertensives.  NPO.   AM labs.    3/27/24: Pt has made some clinical improvement. WBC has increased. Bicarb has decreased. Will run UA, blood cx. Continue to monitor closely for signs of infection. Will offer soft low fat, low residue diet if she can tolerate later today. Will DC NS and start 0.45NS with D5W and 20KCl at 125cc.hr. bmp this afternoon. AM labs.  UA unremarkable.

## 2024-03-27 NOTE — PROGRESS NOTES
"[facility]  [facility]  Progress Note    Patient Name: Shu Mcclure  MRN: [id number]  Patient Class: IP- Inpatient   Admission Date: 3/26/2024  Length of Stay: 1 days  Attending Physician: Britany Reddy DO  Primary Care Provider: Kd Ocampo MD        Subjective:     Principal Problem:Idiopathic acute pancreatitis        HPI:  52yo female presented to the ED 3/26/24 with complaints of LUQ and epigastric pain and N/V/D since yesterday. PMH HTN, GERD, and cholecystectomy 3-4 months ago. She had diarrhea yesterday. None today. Had a BM in the ED. ED dr montes de oca order C diff PCR. Vitals significant for HTN. All other vitals unremarkable. Labs significant for amylase 432, lipase 835, WBC 12.33, K 3.0. BUN, Cr, H/H all within normal limits. Chest xr and abd xr unremarkable. CT abd/pelvis revealed "Findings suggestive of acute pancreatitis with peripancreatic inflammatory changes and fluid. No drainable fluid collection or free air. Diffuse hepatic steatosis."  Pt was given 2 Liter NS bolus is ED and then started on NS 250cc/hr, along with zofran, meperidine, Kcl. Hospital medicine was consulted for admission for continue treatment.    Upon examination, pt endorses epigastric and ruq abd pain, nausea. States pain is 20/10. States pain and nausea meds in ER did not help. Endorses chronic right knee pain for which she was scheduled to have a knee surgery for today. Due to her knee pain, she falls at home often. Denies any confusion, dizziness, SOB, or chest pain.Quit smoking cigarettes 10 years ago.    Overview/Hospital Course:  3/27/24: Pt states that she has not vomited since yesterday. She states that her abd pain has improved slightly. She has been receiving IV opioids every 2-4 hours. Pt again denies alcohol use recently. States she used to drink alcohol regularly years ago, but now only drinks socially and has not drank socially, recently. States she has a headache. " Feels her appetite may be starting to come back. She has urinated without issues, however, reports that she was recently dx with vaginitis and UTI 2/17/24 and was unable to tolerate PO abx so she was given a shot of abx by her PCP. WBC has increased this AM. Denies any dysuria, frequency, hesitancy, or current hematuria. Had hematuria in 2/2024 and was referred to urology for this. She has not seen urology. She also has chronic GI issues with chronic abdominal pain in lower quadrants, which is still present now. States she was referred to GI for EGD and colonoscopy, but has not had this done, yet. She had a colonoscopy 10 yrs ago. She also has chronic back pain with disc sclerosis per pt. Shortly after I left the room, the pt received meperedine and became nauseous and vomited.     Past Medical History:   Diagnosis Date    GERD (gastroesophageal reflux disease)     Hypertension        History reviewed. No pertinent surgical history.    Review of patient's allergies indicates:  No Known Allergies    No current facility-administered medications on file prior to encounter.     Current Outpatient Medications on File Prior to Encounter   Medication Sig    amLODIPine (NORVASC) 5 MG tablet Take 5 mg by mouth.    baclofen (LIORESAL) 10 MG tablet Take 5 mg by mouth 3 (three) times daily as needed.    cloNIDine (CATAPRES) 0.2 MG tablet Take 0.2 mg by mouth every 8 (eight) hours as needed.    LORazepam (ATIVAN) 1 MG tablet Take 1 mg by mouth 2 (two) times daily as needed for Anxiety.    losartan (COZAAR) 25 MG tablet Take 25 mg by mouth once daily.    oseltamivir (TAMIFLU) 75 MG capsule TAKE 1 CAPSULE BY MOUTH TWICE A DAY FOR 5 DAYS    pantoprazole (PROTONIX) 40 MG tablet Take 40 mg by mouth once daily.    EScitalopram oxalate (LEXAPRO) 10 MG tablet Take 10 mg by mouth.    ibuprofen (ADVIL,MOTRIN) 800 MG tablet Take 800 mg by mouth.    ondansetron (ZOFRAN-ODT) 4 MG TbDL Take 8 mg by mouth every 8 (eight) hours as needed.     traMADoL (ULTRAM) 50 mg tablet Take 2 tablets (100 mg total) by mouth every 12 (twelve) hours as needed for Pain.    traZODone (DESYREL) 100 MG tablet Take 1 tablet (100 mg total) by mouth every evening.     Family History    None       Tobacco Use    Smoking status: Never    Smokeless tobacco: Never   Substance and Sexual Activity    Alcohol use: Never    Drug use: Never    Sexual activity: Not on file     Review of Systems   Constitutional:  Positive for activity change and appetite change. Negative for diaphoresis and fever.   Respiratory:  Negative for chest tightness and shortness of breath.    Cardiovascular:  Negative for chest pain and leg swelling.   Gastrointestinal:  Positive for abdominal pain, nausea and vomiting. Negative for abdominal distention, constipation and diarrhea.   Musculoskeletal:  Positive for arthralgias and gait problem.   Skin:  Negative for rash and wound.   Neurological:  Negative for dizziness, weakness and headaches.     Objective:     Vital Signs (Most Recent):  Temp:  (headache) (03/27/24 1134)  Pulse: 98 (03/27/24 0934)  Resp: 18 (03/27/24 0934)  BP: (!) 158/91 (Dr Reddy at bedside.  Informed of V/S with prn hydralazine) (03/27/24 0934)  SpO2: 98 % (03/27/24 0934) Vital Signs (24h Range):  Temp:  [97.9 °F (36.6 °C)-98.8 °F (37.1 °C)] 98.8 °F (37.1 °C)  Pulse:  [71-98] 98  Resp:  [16-21] 18  SpO2:  [96 %-100 %] 98 %  BP: (120-183)/() 158/91     Weight: 57.1 kg (125 lb 14.1 oz)  Body mass index is 23.79 kg/m².     Physical Exam  Vitals and nursing note reviewed.   Constitutional:       General: She is not in acute distress.     Appearance: Normal appearance.   HENT:      Head: Normocephalic and atraumatic.      Nose: Nose normal.   Eyes:      Conjunctiva/sclera: Conjunctivae normal.   Cardiovascular:      Rate and Rhythm: Normal rate and regular rhythm.      Pulses: Normal pulses.      Heart sounds: Normal heart sounds. No murmur heard.     No gallop.   Pulmonary:       Effort: Pulmonary effort is normal.      Breath sounds: Normal breath sounds. No wheezing, rhonchi or rales.   Abdominal:      General: Bowel sounds are normal. There is no distension.      Palpations: Abdomen is soft.      Tenderness: There is abdominal tenderness. There is no guarding.   Musculoskeletal:         General: No swelling or deformity. Normal range of motion.      Cervical back: Normal range of motion and neck supple.      Right lower leg: No edema.      Left lower leg: No edema.   Skin:     General: Skin is warm and dry.      Findings: No rash.   Neurological:      General: No focal deficit present.      Mental Status: She is alert and oriented to person, place, and time. Mental status is at baseline.      Gait: Gait normal.   Psychiatric:         Mood and Affect: Mood normal.         Thought Content: Thought content normal.         Judgment: Judgment normal.                Significant Labs: All pertinent labs within the past 24 hours have been reviewed.  Recent Lab Results         03/27/24  0432   03/26/24  1345        Phencyclidine   Negative       Albumin/Globulin Ratio 1.1         Albumin 3.3         ALP 69         ALT 19         Amphetamines, Urine   Negative       Appearance, UA   Clear       AST 20         Bacteria, UA   Rare       Barbituates, Urine   Negative       Baso # 0.03         Basophil % 0.2         Benzodiazepine, Urine   Negative       BILIRUBIN TOTAL 0.6         Bilirubin, UA   Negative       BUN 9.0         Calcium 8.1         Cannabinoids, Urine   Negative       Chloride 116         CO2 16         Cocaine, Urine   Negative       Color, UA   Yellow       Creatinine 0.65         eGFR >60         Eos # 0.00         Eos % 0.0         Fentanyl, Urine   Negative       Globulin, Total 3.1         Glucose 101         Glucose, UA   Negative       Hematocrit 40.0         Hemoglobin 13.8         Immature Grans (Abs) 0.10         Immature Granulocytes 0.7         Ketones, UA   1+        Leukocyte Esterase, UA   Negative       Lymph # 0.72         LYMPH % 4.7         MCH 33.1         MCHC 34.5         MCV 95.9         MDMA, Urine   Negative       Mono # 0.82         Mono % 5.4         MPV 8.5         Neut # 13.55         Neut % 89.0         NITRITE UA   Negative       nRBC 0.0         Blood, UA   Trace-Intact       Opiates, Urine   Negative       pH, UA   5.5       pH, Urine   5.0       Platelet Count 266         Potassium 3.9         hCG Qualitative, Urine   Negative       PROTEIN TOTAL 6.4         Protein, UA   Negative       RBC 4.17         RBC, UA   0-2       RDW 13.2         Sodium 144         Specific Gravity,UA   1.015       Specific Gravity, Urine Auto   1.015       Squam Epithel, UA   Rare       Urobilinogen, UA   0.2       WBC, UA   0-2       WBC 15.22                 Significant Imaging: I have reviewed all pertinent imaging results/findings within the past 24 hours.    Assessment/Plan:      * Idiopathic acute pancreatitis  Admit to inpt.  Aggressive IVF resuscitation. Received 2 L bolus in ED and then started on 250cc/hr. Will decrease rate to 175cc/hr after 3rd liter is completed.  PRN analgesics, antiemetics.  Reconciled home meds. PRN IV antihypertensives.  NPO.   AM labs.    3/27/24: Pt has made some clinical improvement. WBC has increased. Bicarb has decreased. Will run UA, blood cx. Continue to monitor closely for signs of infection. Will offer soft low fat, low residue diet if she can tolerate later today. Will DC NS and start 0.45NS with D5W and 20KCl at 125cc.hr. bmp this afternoon. AM labs.  UA unremarkable.     GERD (gastroesophageal reflux disease)  Continue home pantoprazole , IV for now.      HTN (hypertension)  Chronic, controlled. Latest blood pressure and vitals reviewed-     Temp:  [97.9 °F (36.6 °C)-98.8 °F (37.1 °C)]   Pulse:  [71-98]   Resp:  [16-21]   BP: (120-183)/()   SpO2:  [96 %-100 %] .   Home meds for hypertension were reviewed and noted below.    Hypertension Medications               amLODIPine (NORVASC) 5 MG tablet Take 5 mg by mouth.    cloNIDine (CATAPRES) 0.2 MG tablet Take 0.2 mg by mouth every 8 (eight) hours as needed.    losartan (COZAAR) 25 MG tablet Take 25 mg by mouth once daily.            While in the hospital, will manage blood pressure as follows; Adjust home antihypertensive regimen as follows- hold while NPO.    Will utilize p.r.n. blood pressure medication only if patient's blood pressure greater than  155/90 .    Epigastric abdominal pain          VTE Risk Mitigation (From admission, onward)           Ordered     IP VTE LOW RISK PATIENT  Once         03/26/24 1429     Place SOBEIDA hose  Until discontinued         03/26/24 1429     Place sequential compression device  Until discontinued         03/26/24 1429                    Discharge Planning   ROSA:      Code Status: Full Code   Is the patient medically ready for discharge?:     Reason for patient still in hospital (select all that apply): Patient trending condition, Laboratory test, and Treatment  Discharge Plan A: Home Health, Home                  OZ VEGA DO  Department of Hospital Medicine   [facility]

## 2024-03-27 NOTE — HOSPITAL COURSE
3/27/24: Pt states that she has not vomited since yesterday. She states that her abd pain has improved slightly. She has been receiving IV opioids every 2-4 hours. Pt again denies alcohol use recently. States she used to drink alcohol regularly years ago, but now only drinks socially and has not drank socially, recently. States she has a headache. Feels her appetite may be starting to come back. She has urinated without issues, however, reports that she was recently dx with vaginitis and UTI 2/17/24 and was unable to tolerate PO abx so she was given a shot of abx by her PCP. WBC has increased this AM. Denies any dysuria, frequency, hesitancy, or current hematuria. Had hematuria in 2/2024 and was referred to urology for this. She has not seen urology. She also has chronic GI issues with chronic abdominal pain in lower quadrants, which is still present now. States she was referred to GI for EGD and colonoscopy, but has not had this done, yet. She had a colonoscopy 10 yrs ago. She also has chronic back pain with disc sclerosis per pt. Shortly after I left the room, the pt received meperedine and became nauseous and vomited.     3/28/24: Pt is feeling better today. States that her abd pain is more of a soreness, now.Denies current N/V/D. States she has not had a bm. Has been NPO. Feels she could try something by mouth today.    3/29/24: Pt feeling much better this AM. She is sitting up in bed eating lunch. She at some for breakfast and is tolerating her lunch well. She spit out the Kcl tablets due to fear it would upset her stomach. Took other mes by mouth with no issues. Wants Kcl IV rather than PO. After 40meq Kcl IV will DC home with rx for prn zofran per pt request. She is not experiencing any nausea at this time. She asked about pain meds. I rec she follow-up with her pcp/pain mgt specialist for her chronic pain meds. She voiced understanding. Tylenol and gas x otc prn upon dc.    PE:   General: alert and  oriented.  HEENT: NC, AT.  Resp: CTAB.  Cardio: RRR, no m/r/g. No edema.  Abd: soft, mildly tender to moderate to deep palpation of LUQ. Pt has chronic abd pain. ND, + BS x 4

## 2024-03-28 LAB
ALBUMIN SERPL-MCNC: 2.8 G/DL (ref 3.5–5)
ALBUMIN/GLOB SERPL: 0.9 RATIO (ref 1.1–2)
ALP SERPL-CCNC: 69 UNIT/L (ref 40–150)
ALT SERPL-CCNC: 13 UNIT/L (ref 0–55)
AMYLASE SERPL-CCNC: 319 UNIT/L (ref 25–125)
AST SERPL-CCNC: 20 UNIT/L (ref 5–34)
BASOPHILS # BLD AUTO: 0.02 X10(3)/MCL
BASOPHILS NFR BLD AUTO: 0.2 %
BILIRUB SERPL-MCNC: 0.8 MG/DL
BUN SERPL-MCNC: 3 MG/DL (ref 9.8–20.1)
CALCIUM SERPL-MCNC: 8.4 MG/DL (ref 8.4–10.2)
CHLORIDE SERPL-SCNC: 109 MMOL/L (ref 98–107)
CO2 SERPL-SCNC: 21 MMOL/L (ref 22–29)
CREAT SERPL-MCNC: 0.57 MG/DL (ref 0.55–1.02)
EOSINOPHIL # BLD AUTO: 0.05 X10(3)/MCL (ref 0–0.9)
EOSINOPHIL NFR BLD AUTO: 0.5 %
ERYTHROCYTE [DISTWIDTH] IN BLOOD BY AUTOMATED COUNT: 13.5 % (ref 11.5–17)
GFR SERPLBLD CREATININE-BSD FMLA CKD-EPI: >60 MLS/MIN/1.73/M2
GLOBULIN SER-MCNC: 3.2 GM/DL (ref 2.4–3.5)
GLUCOSE SERPL-MCNC: 137 MG/DL (ref 74–100)
HCT VFR BLD AUTO: 34.5 % (ref 37–47)
HGB BLD-MCNC: 11.8 G/DL (ref 12–16)
IMM GRANULOCYTES # BLD AUTO: 0.09 X10(3)/MCL (ref 0–0.04)
IMM GRANULOCYTES NFR BLD AUTO: 0.9 %
LIPASE SERPL-CCNC: 282 U/L
LYMPHOCYTES # BLD AUTO: 1.1 X10(3)/MCL (ref 0.6–4.6)
LYMPHOCYTES NFR BLD AUTO: 11.3 %
MCH RBC QN AUTO: 32.7 PG (ref 27–31)
MCHC RBC AUTO-ENTMCNC: 34.2 G/DL (ref 33–36)
MCV RBC AUTO: 95.6 FL (ref 80–94)
MONOCYTES # BLD AUTO: 0.62 X10(3)/MCL (ref 0.1–1.3)
MONOCYTES NFR BLD AUTO: 6.4 %
NEUTROPHILS # BLD AUTO: 7.84 X10(3)/MCL (ref 2.1–9.2)
NEUTROPHILS NFR BLD AUTO: 80.7 %
NRBC BLD AUTO-RTO: 0 %
PLATELET # BLD AUTO: 210 X10(3)/MCL (ref 130–400)
PMV BLD AUTO: 8.8 FL (ref 7.4–10.4)
POTASSIUM SERPL-SCNC: 3.3 MMOL/L (ref 3.5–5.1)
PROT SERPL-MCNC: 6 GM/DL (ref 6.4–8.3)
RBC # BLD AUTO: 3.61 X10(6)/MCL (ref 4.2–5.4)
SODIUM SERPL-SCNC: 137 MMOL/L (ref 136–145)
WBC # SPEC AUTO: 9.72 X10(3)/MCL (ref 4.5–11.5)

## 2024-03-28 PROCEDURE — 85025 COMPLETE CBC W/AUTO DIFF WBC: CPT | Performed by: INTERNAL MEDICINE

## 2024-03-28 PROCEDURE — C9113 INJ PANTOPRAZOLE SODIUM, VIA: HCPCS | Performed by: INTERNAL MEDICINE

## 2024-03-28 PROCEDURE — 25000003 PHARM REV CODE 250: Performed by: INTERNAL MEDICINE

## 2024-03-28 PROCEDURE — 11000001 HC ACUTE MED/SURG PRIVATE ROOM

## 2024-03-28 PROCEDURE — 80053 COMPREHEN METABOLIC PANEL: CPT | Performed by: INTERNAL MEDICINE

## 2024-03-28 PROCEDURE — 94761 N-INVAS EAR/PLS OXIMETRY MLT: CPT

## 2024-03-28 PROCEDURE — 63600175 PHARM REV CODE 636 W HCPCS: Performed by: INTERNAL MEDICINE

## 2024-03-28 PROCEDURE — 83690 ASSAY OF LIPASE: CPT | Performed by: INTERNAL MEDICINE

## 2024-03-28 PROCEDURE — 82150 ASSAY OF AMYLASE: CPT | Performed by: INTERNAL MEDICINE

## 2024-03-28 RX ORDER — SIMETHICONE 125 MG
125 TABLET,CHEWABLE ORAL EVERY 6 HOURS PRN
Status: DISCONTINUED | OUTPATIENT
Start: 2024-03-28 | End: 2024-03-29 | Stop reason: HOSPADM

## 2024-03-28 RX ORDER — ACETAMINOPHEN 325 MG/1
650 TABLET ORAL EVERY 6 HOURS PRN
Status: DISCONTINUED | OUTPATIENT
Start: 2024-03-28 | End: 2024-03-29 | Stop reason: HOSPADM

## 2024-03-28 RX ADMIN — SIMETHICONE 125 MG: 125 TABLET, CHEWABLE ORAL at 09:03

## 2024-03-28 RX ADMIN — ACETAMINOPHEN 650 MG: 325 TABLET, FILM COATED ORAL at 04:03

## 2024-03-28 RX ADMIN — AMLODIPINE BESYLATE 5 MG: 5 TABLET ORAL at 08:03

## 2024-03-28 RX ADMIN — LOSARTAN POTASSIUM 25 MG: 25 TABLET, FILM COATED ORAL at 08:03

## 2024-03-28 RX ADMIN — POTASSIUM CHLORIDE, DEXTROSE MONOHYDRATE AND SODIUM CHLORIDE 125 ML/HR: 150; 5; 450 INJECTION, SOLUTION INTRAVENOUS at 07:03

## 2024-03-28 RX ADMIN — HYDROMORPHONE HYDROCHLORIDE 1 MG: 2 INJECTION, SOLUTION INTRAMUSCULAR; INTRAVENOUS; SUBCUTANEOUS at 04:03

## 2024-03-28 RX ADMIN — HYDROMORPHONE HYDROCHLORIDE 1 MG: 2 INJECTION, SOLUTION INTRAMUSCULAR; INTRAVENOUS; SUBCUTANEOUS at 07:03

## 2024-03-28 RX ADMIN — PANTOPRAZOLE SODIUM 40 MG: 40 INJECTION, POWDER, LYOPHILIZED, FOR SOLUTION INTRAVENOUS at 08:03

## 2024-03-28 RX ADMIN — HYDROMORPHONE HYDROCHLORIDE 1 MG: 2 INJECTION, SOLUTION INTRAMUSCULAR; INTRAVENOUS; SUBCUTANEOUS at 10:03

## 2024-03-28 NOTE — PLAN OF CARE
Problem: Adult Inpatient Plan of Care  Goal: Plan of Care Review  Outcome: Ongoing, Progressing  Goal: Patient-Specific Goal (Individualized)  Outcome: Ongoing, Progressing  Goal: Absence of Hospital-Acquired Illness or Injury  Outcome: Ongoing, Progressing  Goal: Optimal Comfort and Wellbeing  Outcome: Ongoing, Progressing  Goal: Readiness for Transition of Care  Outcome: Ongoing, Progressing     Problem: Fall Injury Risk  Goal: Absence of Fall and Fall-Related Injury  Outcome: Ongoing, Progressing     Problem: Pain Acute  Goal: Acceptable Pain Control and Functional Ability  Outcome: Ongoing, Progressing     Problem: Fatigue  Goal: Improved Activity Tolerance  Outcome: Ongoing, Progressing     Problem: Infection  Goal: Absence of Infection Signs and Symptoms  Outcome: Ongoing, Progressing     Problem: Fluid Imbalance (Pancreatitis)  Goal: Fluid Balance  Outcome: Ongoing, Progressing     Problem: Infection (Pancreatitis)  Goal: Infection Symptom Resolution  Outcome: Ongoing, Progressing     Problem: Nutrition Impaired (Pancreatitis)  Goal: Optimal Nutrition Intake  Outcome: Ongoing, Progressing     Problem: Pain (Pancreatitis)  Goal: Acceptable Pain Control  Outcome: Ongoing, Progressing     Problem: Respiratory Compromise (Pancreatitis)  Goal: Effective Oxygenation and Ventilation  Outcome: Ongoing, Progressing     Problem: Hypertension Comorbidity  Goal: Blood Pressure in Desired Range  Outcome: Ongoing, Progressing     Problem: Osteoarthritis Comorbidity  Goal: Maintenance of Osteoarthritis Symptom Control  Outcome: Ongoing, Progressing

## 2024-03-28 NOTE — ASSESSMENT & PLAN NOTE
Admit to inpt.  Aggressive IVF resuscitation. Received 2 L bolus in ED and then started on 250cc/hr. Will decrease rate to 175cc/hr after 3rd liter is completed.  PRN analgesics, antiemetics.  Reconciled home meds. PRN IV antihypertensives.  NPO.   AM labs.    3/27/24: Pt has made some clinical improvement. WBC has increased. Bicarb has decreased. Will run UA, blood cx. Continue to monitor closely for signs of infection. Will offer soft low fat, low residue diet if she can tolerate later today. Will DC NS and start 0.45NS with D5W and 20KCl at 125cc.hr. bmp this afternoon. AM labs.  UA unremarkable.   3/28/24: Labs improving. Pt will try a diet.   This afternoon. Pt tolerated PO. Will finish off this bag of IVF and then DC. If pt becomes nauseated and can't tolerate PO we can restart fluids.

## 2024-03-28 NOTE — ASSESSMENT & PLAN NOTE
Nutrition consulted. Most recent weight and BMI monitored-     Measurements:  Wt Readings from Last 1 Encounters:   03/26/24 57.1 kg (125 lb 14.1 oz)   Body mass index is 23.79 kg/m².    Patient has been screened and assessed by RD.    Malnutrition Type:  Context: chronic illness  Level: moderate    Malnutrition Characteristic Summary:  Weight Loss (Malnutrition): greater than 10% in 6 months  Energy Intake (Malnutrition): less than 75% for greater than or equal to 3 months  Subcutaneous Fat (Malnutrition): moderate depletion  Muscle Mass (Malnutrition): moderate depletion    Interventions/Recommendations (treatment strategy):

## 2024-03-28 NOTE — PROGRESS NOTES
"[facility] - Medical Surgical Unit  [facility] Medicine  Progress Note    Patient Name: Shu Mcclure  MRN: [id number]  Patient Class: IP- Inpatient   Admission Date: 3/26/2024  Length of Stay: 2 days  Attending Physician: Britany Reddy DO  Primary Care Provider: Kd Ocampo MD        Subjective:     Principal Problem:Idiopathic acute pancreatitis        HPI:  54yo female presented to the ED 3/26/24 with complaints of LUQ and epigastric pain and N/V/D since yesterday. PMH HTN, GERD, and cholecystectomy 3-4 months ago. She had diarrhea yesterday. None today. Had a BM in the ED. ED dr montes de oca order C diff PCR. Vitals significant for HTN. All other vitals unremarkable. Labs significant for amylase 432, lipase 835, WBC 12.33, K 3.0. BUN, Cr, H/H all within normal limits. Chest xr and abd xr unremarkable. CT abd/pelvis revealed "Findings suggestive of acute pancreatitis with peripancreatic inflammatory changes and fluid. No drainable fluid collection or free air. Diffuse hepatic steatosis."  Pt was given 2 Liter NS bolus is ED and then started on NS 250cc/hr, along with zofran, meperidine, Kcl. Hospital medicine was consulted for admission for continue treatment.    Upon examination, pt endorses epigastric and ruq abd pain, nausea. States pain is 20/10. States pain and nausea meds in ER did not help. Endorses chronic right knee pain for which she was scheduled to have a knee surgery for today. Due to her knee pain, she falls at home often. Denies any confusion, dizziness, SOB, or chest pain.Quit smoking cigarettes 10 years ago.    Overview/Hospital Course:  3/27/24: Pt states that she has not vomited since yesterday. She states that her abd pain has improved slightly. She has been receiving IV opioids every 2-4 hours. Pt again denies alcohol use recently. States she used to drink alcohol regularly years ago, but now only drinks socially and has not drank socially, recently. States she has a headache. " Feels her appetite may be starting to come back. She has urinated without issues, however, reports that she was recently dx with vaginitis and UTI 2/17/24 and was unable to tolerate PO abx so she was given a shot of abx by her PCP. WBC has increased this AM. Denies any dysuria, frequency, hesitancy, or current hematuria. Had hematuria in 2/2024 and was referred to urology for this. She has not seen urology. She also has chronic GI issues with chronic abdominal pain in lower quadrants, which is still present now. States she was referred to GI for EGD and colonoscopy, but has not had this done, yet. She had a colonoscopy 10 yrs ago. She also has chronic back pain with disc sclerosis per pt. Shortly after I left the room, the pt received meperedine and became nauseous and vomited.     3/28/24: Pt is feeling better today. States that her abd pain is more of a soreness, now.Denies current N/V/D. States she has not had a bm. Has been NPO. Feels she could try something by mouth today.    Past Medical History:   Diagnosis Date    GERD (gastroesophageal reflux disease)     Hypertension        History reviewed. No pertinent surgical history.    Review of patient's allergies indicates:  No Known Allergies    No current facility-administered medications on file prior to encounter.     Current Outpatient Medications on File Prior to Encounter   Medication Sig    amLODIPine (NORVASC) 5 MG tablet Take 5 mg by mouth.    baclofen (LIORESAL) 10 MG tablet Take 5 mg by mouth 3 (three) times daily as needed.    cloNIDine (CATAPRES) 0.2 MG tablet Take 0.2 mg by mouth every 8 (eight) hours as needed.    LORazepam (ATIVAN) 1 MG tablet Take 1 mg by mouth 2 (two) times daily as needed for Anxiety.    losartan (COZAAR) 25 MG tablet Take 25 mg by mouth once daily.    oseltamivir (TAMIFLU) 75 MG capsule TAKE 1 CAPSULE BY MOUTH TWICE A DAY FOR 5 DAYS    pantoprazole (PROTONIX) 40 MG tablet Take 40 mg by mouth once daily.    EScitalopram oxalate  (LEXAPRO) 10 MG tablet Take 10 mg by mouth.    ibuprofen (ADVIL,MOTRIN) 800 MG tablet Take 800 mg by mouth.    ondansetron (ZOFRAN-ODT) 4 MG TbDL Take 8 mg by mouth every 8 (eight) hours as needed.    traMADoL (ULTRAM) 50 mg tablet Take 2 tablets (100 mg total) by mouth every 12 (twelve) hours as needed for Pain.    traZODone (DESYREL) 100 MG tablet Take 1 tablet (100 mg total) by mouth every evening.     Family History    None       Tobacco Use    Smoking status: Never    Smokeless tobacco: Never   Substance and Sexual Activity    Alcohol use: Never    Drug use: Never    Sexual activity: Not on file     Review of Systems   Constitutional:  Positive for activity change and appetite change. Negative for diaphoresis and fever.   Respiratory:  Negative for chest tightness and shortness of breath.    Cardiovascular:  Negative for chest pain and leg swelling.   Gastrointestinal:  Positive for abdominal pain. Negative for abdominal distention, constipation, diarrhea, nausea and vomiting.   Musculoskeletal:  Positive for arthralgias and gait problem.   Skin:  Negative for rash and wound.   Neurological:  Negative for dizziness, weakness and headaches.     Objective:     Vital Signs (Most Recent):  Temp: 99.1 °F (37.3 °C) (03/28/24 0736)  Pulse: 77 (03/28/24 0736)  Resp: 18 (03/28/24 1002)  BP: 139/79 (03/28/24 0850)  SpO2: 97 % (03/28/24 0736) Vital Signs (24h Range):  Temp:  [98.9 °F (37.2 °C)-100.1 °F (37.8 °C)] 99.1 °F (37.3 °C)  Pulse:  [] 77  Resp:  [16-18] 18  SpO2:  [96 %-98 %] 97 %  BP: (128-175)/() 139/79     Weight: 57.1 kg (125 lb 14.1 oz)  Body mass index is 23.79 kg/m².     Physical Exam  Vitals and nursing note reviewed.   Constitutional:       General: She is not in acute distress.     Appearance: Normal appearance.   HENT:      Head: Normocephalic and atraumatic.      Nose: Nose normal.   Eyes:      Conjunctiva/sclera: Conjunctivae normal.   Cardiovascular:      Rate and Rhythm: Normal rate and  regular rhythm.      Pulses: Normal pulses.      Heart sounds: Normal heart sounds. No murmur heard.     No gallop.   Pulmonary:      Effort: Pulmonary effort is normal.      Breath sounds: Normal breath sounds. No wheezing, rhonchi or rales.   Abdominal:      General: Bowel sounds are normal. There is no distension.      Palpations: Abdomen is soft.      Tenderness: There is abdominal tenderness. There is no guarding.   Musculoskeletal:         General: No swelling or deformity. Normal range of motion.      Cervical back: Normal range of motion and neck supple.      Right lower leg: No edema.      Left lower leg: No edema.   Skin:     General: Skin is warm and dry.      Findings: No rash.   Neurological:      General: No focal deficit present.      Mental Status: She is alert and oriented to person, place, and time. Mental status is at baseline.      Gait: Gait normal.   Psychiatric:         Mood and Affect: Mood normal.         Thought Content: Thought content normal.         Judgment: Judgment normal.                Significant Labs: All pertinent labs within the past 24 hours have been reviewed.  Recent Lab Results         03/28/24  0509        Albumin/Globulin Ratio 0.9       Albumin 2.8       ALP 69       ALT 13       Amylase Level 319       AST 20       Baso # 0.02       Basophil % 0.2       BILIRUBIN TOTAL 0.8       BUN 3.0       Calcium 8.4       Chloride 109       CO2 21       Creatinine 0.57       eGFR >60       Eos # 0.05       Eos % 0.5       Globulin, Total 3.2       Glucose 137       Hematocrit 34.5       Hemoglobin 11.8       Immature Grans (Abs) 0.09       Immature Granulocytes 0.9       Lipase 282       Lymph # 1.10       LYMPH % 11.3       MCH 32.7       MCHC 34.2       MCV 95.6       Mono # 0.62       Mono % 6.4       MPV 8.8       Neut # 7.84       Neut % 80.7       nRBC 0.0       Platelet Count 210       Potassium 3.3       PROTEIN TOTAL 6.0       RBC 3.61       RDW 13.5       Sodium 137        WBC 9.72               Significant Imaging: I have reviewed all pertinent imaging results/findings within the past 24 hours.    Assessment/Plan:      * Idiopathic acute pancreatitis  Admit to inpt.  Aggressive IVF resuscitation. Received 2 L bolus in ED and then started on 250cc/hr. Will decrease rate to 175cc/hr after 3rd liter is completed.  PRN analgesics, antiemetics.  Reconciled home meds. PRN IV antihypertensives.  NPO.   AM labs.    3/27/24: Pt has made some clinical improvement. WBC has increased. Bicarb has decreased. Will run UA, blood cx. Continue to monitor closely for signs of infection. Will offer soft low fat, low residue diet if she can tolerate later today. Will DC NS and start 0.45NS with D5W and 20KCl at 125cc.hr. bmp this afternoon. AM labs.  UA unremarkable.   3/28/24: Labs improving. Pt will try a diet.   This afternoon. Pt tolerated PO. Will finish off this bag of IVF and then DC. If pt becomes nauseated and can't tolerate PO we can restart fluids.    Moderate protein-calorie malnutrition  Nutrition consulted. Most recent weight and BMI monitored-     Measurements:  Wt Readings from Last 1 Encounters:   03/26/24 57.1 kg (125 lb 14.1 oz)   Body mass index is 23.79 kg/m².    Patient has been screened and assessed by RD.    Malnutrition Type:  Context: chronic illness  Level: moderate    Malnutrition Characteristic Summary:  Weight Loss (Malnutrition): greater than 10% in 6 months  Energy Intake (Malnutrition): less than 75% for greater than or equal to 3 months  Subcutaneous Fat (Malnutrition): moderate depletion  Muscle Mass (Malnutrition): moderate depletion    Interventions/Recommendations (treatment strategy):         GERD (gastroesophageal reflux disease)  Continue home pantoprazole , IV for now.      HTN (hypertension)  Chronic, controlled. Latest blood pressure and vitals reviewed-     Temp:  [98.9 °F (37.2 °C)-100.1 °F (37.8 °C)]   Pulse:  []   Resp:  [16-18]   BP:  (128-175)/()   SpO2:  [96 %-98 %] .   Home meds for hypertension were reviewed and noted below.   Hypertension Medications               amLODIPine (NORVASC) 5 MG tablet Take 5 mg by mouth.    cloNIDine (CATAPRES) 0.2 MG tablet Take 0.2 mg by mouth every 8 (eight) hours as needed.    losartan (COZAAR) 25 MG tablet Take 25 mg by mouth once daily.            While in the hospital, will manage blood pressure as follows; Adjust home antihypertensive regimen as follows- hold while NPO.    Will utilize p.r.n. blood pressure medication only if patient's blood pressure greater than  155/90 .    Epigastric abdominal pain          VTE Risk Mitigation (From admission, onward)           Ordered     IP VTE LOW RISK PATIENT  Once         03/26/24 1429     Place sequential compression device  Until discontinued         03/26/24 1429                    Discharge Planning   ROSA:      Code Status: Full Code   Is the patient medically ready for discharge?:     Reason for patient still in hospital (select all that apply): Patient trending condition, Laboratory test, and Treatment  Discharge Plan A: Home Health, Home   Discharge Delays: None known at this time              OZ VEGA DO  Department of Hospital Medicine   [facility] - [facility]

## 2024-03-28 NOTE — PLAN OF CARE
03/28/24 1422   Discharge Reassessment   Assessment Type Discharge Planning Reassessment   Did the patient's condition or plan change since previous assessment? No   Discharge Plan discussed with: Patient   Communicated ROSA with patient/caregiver Yes   Discharge Plan A Home Health;Home   DME Needed Upon Discharge  none   Transition of Care Barriers None   Why the patient remains in the hospital Requires continued medical care   Post-Acute Status   Coverage Medicaid   Discharge Delays None known at this time

## 2024-03-28 NOTE — SUBJECTIVE & OBJECTIVE
Past Medical History:   Diagnosis Date    GERD (gastroesophageal reflux disease)     Hypertension        History reviewed. No pertinent surgical history.    Review of patient's allergies indicates:  No Known Allergies    No current facility-administered medications on file prior to encounter.     Current Outpatient Medications on File Prior to Encounter   Medication Sig    amLODIPine (NORVASC) 5 MG tablet Take 5 mg by mouth.    baclofen (LIORESAL) 10 MG tablet Take 5 mg by mouth 3 (three) times daily as needed.    cloNIDine (CATAPRES) 0.2 MG tablet Take 0.2 mg by mouth every 8 (eight) hours as needed.    LORazepam (ATIVAN) 1 MG tablet Take 1 mg by mouth 2 (two) times daily as needed for Anxiety.    losartan (COZAAR) 25 MG tablet Take 25 mg by mouth once daily.    oseltamivir (TAMIFLU) 75 MG capsule TAKE 1 CAPSULE BY MOUTH TWICE A DAY FOR 5 DAYS    pantoprazole (PROTONIX) 40 MG tablet Take 40 mg by mouth once daily.    EScitalopram oxalate (LEXAPRO) 10 MG tablet Take 10 mg by mouth.    ibuprofen (ADVIL,MOTRIN) 800 MG tablet Take 800 mg by mouth.    ondansetron (ZOFRAN-ODT) 4 MG TbDL Take 8 mg by mouth every 8 (eight) hours as needed.    traMADoL (ULTRAM) 50 mg tablet Take 2 tablets (100 mg total) by mouth every 12 (twelve) hours as needed for Pain.    traZODone (DESYREL) 100 MG tablet Take 1 tablet (100 mg total) by mouth every evening.     Family History    None       Tobacco Use    Smoking status: Never    Smokeless tobacco: Never   Substance and Sexual Activity    Alcohol use: Never    Drug use: Never    Sexual activity: Not on file     Review of Systems   Constitutional:  Positive for activity change and appetite change. Negative for diaphoresis and fever.   Respiratory:  Negative for chest tightness and shortness of breath.    Cardiovascular:  Negative for chest pain and leg swelling.   Gastrointestinal:  Positive for abdominal pain. Negative for abdominal distention, constipation, diarrhea, nausea and vomiting.    Musculoskeletal:  Positive for arthralgias and gait problem.   Skin:  Negative for rash and wound.   Neurological:  Negative for dizziness, weakness and headaches.     Objective:     Vital Signs (Most Recent):  Temp: 99.1 °F (37.3 °C) (03/28/24 0736)  Pulse: 77 (03/28/24 0736)  Resp: 18 (03/28/24 1002)  BP: 139/79 (03/28/24 0850)  SpO2: 97 % (03/28/24 0736) Vital Signs (24h Range):  Temp:  [98.9 °F (37.2 °C)-100.1 °F (37.8 °C)] 99.1 °F (37.3 °C)  Pulse:  [] 77  Resp:  [16-18] 18  SpO2:  [96 %-98 %] 97 %  BP: (128-175)/() 139/79     Weight: 57.1 kg (125 lb 14.1 oz)  Body mass index is 23.79 kg/m².     Physical Exam  Vitals and nursing note reviewed.   Constitutional:       General: She is not in acute distress.     Appearance: Normal appearance.   HENT:      Head: Normocephalic and atraumatic.      Nose: Nose normal.   Eyes:      Conjunctiva/sclera: Conjunctivae normal.   Cardiovascular:      Rate and Rhythm: Normal rate and regular rhythm.      Pulses: Normal pulses.      Heart sounds: Normal heart sounds. No murmur heard.     No gallop.   Pulmonary:      Effort: Pulmonary effort is normal.      Breath sounds: Normal breath sounds. No wheezing, rhonchi or rales.   Abdominal:      General: Bowel sounds are normal. There is no distension.      Palpations: Abdomen is soft.      Tenderness: There is abdominal tenderness. There is no guarding.   Musculoskeletal:         General: No swelling or deformity. Normal range of motion.      Cervical back: Normal range of motion and neck supple.      Right lower leg: No edema.      Left lower leg: No edema.   Skin:     General: Skin is warm and dry.      Findings: No rash.   Neurological:      General: No focal deficit present.      Mental Status: She is alert and oriented to person, place, and time. Mental status is at baseline.      Gait: Gait normal.   Psychiatric:         Mood and Affect: Mood normal.         Thought Content: Thought content normal.          Judgment: Judgment normal.                Significant Labs: All pertinent labs within the past 24 hours have been reviewed.  Recent Lab Results         03/28/24  0509        Albumin/Globulin Ratio 0.9       Albumin 2.8       ALP 69       ALT 13       Amylase Level 319       AST 20       Baso # 0.02       Basophil % 0.2       BILIRUBIN TOTAL 0.8       BUN 3.0       Calcium 8.4       Chloride 109       CO2 21       Creatinine 0.57       eGFR >60       Eos # 0.05       Eos % 0.5       Globulin, Total 3.2       Glucose 137       Hematocrit 34.5       Hemoglobin 11.8       Immature Grans (Abs) 0.09       Immature Granulocytes 0.9       Lipase 282       Lymph # 1.10       LYMPH % 11.3       MCH 32.7       MCHC 34.2       MCV 95.6       Mono # 0.62       Mono % 6.4       MPV 8.8       Neut # 7.84       Neut % 80.7       nRBC 0.0       Platelet Count 210       Potassium 3.3       PROTEIN TOTAL 6.0       RBC 3.61       RDW 13.5       Sodium 137       WBC 9.72               Significant Imaging: I have reviewed all pertinent imaging results/findings within the past 24 hours.

## 2024-03-28 NOTE — PROGRESS NOTES
Inpatient Nutrition Assessment    Admit Date: 3/26/2024   Total duration of encounter: 2 days   Patient Age: 53 y.o.    Nutrition Recommendation/Prescription     If unable to upgrade diet and tolerate, rec' PPN of Clinimix E 4.25/5 @ 85 ml/hr to provide  694 kcal/d (meets 41% of est needs)  87 gm amino acids/d (meets 100% of est needs)  102 gm dextrose/d (1.2 Glucose Infusion Rate)  2040 ml/d (meets 119% of fluid needs)  Daily weights  Monitor BMP daily and replace lytes as needed.  Monitor tolerance to diet advancement.    Communication of Recommendations: reviewed with provider and reviewed with nurse    Nutrition Assessment     Malnutrition Assessment/Nutrition-Focused Physical Exam    Malnutrition Context: chronic illness (03/27/24 1331)  Malnutrition Level: moderate (03/27/24 1331)  Energy Intake (Malnutrition): less than 75% for greater than or equal to 3 months (03/27/24 1331)  Weight Loss (Malnutrition): greater than 10% in 6 months (03/27/24 1331)  Subcutaneous Fat (Malnutrition): moderate depletion (03/27/24 1331)  Orbital Region (Subcutaneous Fat Loss): mild depletion  Upper Arm Region (Subcutaneous Fat Loss): mild depletion     Muscle Mass (Malnutrition): moderate depletion (03/27/24 1331)  Ridgefield Region (Muscle Loss): mild depletion  Clavicle Bone Region (Muscle Loss): mild depletion                             A minimum of two characteristics is recommended for diagnosis of either severe or non-severe malnutrition.    Chart Review    Reason Seen: follow-up    Malnutrition Screening Tool Results   Have you recently lost weight without trying?: No  Have you been eating poorly because of a decreased appetite?: No   MST Score: 0   Diagnosis:  Idiopathic acute pancreatitis    Relevant Medical History:   HTN, GERD    Scheduled Medications:  amLODIPine, 5 mg, Daily  losartan, 25 mg, Daily  pantoprazole, 40 mg, Daily    Continuous Infusions:  dextrose 5 % and 0.45 % NaCl with KCl 20 mEq, Last Rate: 125 mL/hr  "(03/28/24 9710)    PRN Medications: acetaminophen, hydrALAZINE, HYDROmorphone, labetalol, lorazepam, ondansetron, promethazine (PHENERGAN) 12.5 mg in dextrose 5 % (D5W) 50 mL IVPB, simethicone, sodium chloride 0.9%    Calorie Containing IV Medications: no significant kcals from medications at this time    Recent Labs   Lab 03/26/24  1126 03/27/24  0432 03/27/24  1511 03/28/24  0509    144 141 137   K 3.0* 3.9 3.6 3.3*   CALCIUM 9.3 8.1* 8.2* 8.4   CHLORIDE 108* 116* 113* 109*   CO2 21* 16* 20* 21*   BUN 12.0 9.0* 6.0* 3.0*   CREATININE 0.77 0.65 0.64 0.57   EGFRNORACEVR >60 >60 >60 >60   GLUCOSE 116* 101* 135* 137*   BILITOT 0.5 0.6  --  0.8   ALKPHOS 96 69  --  69   ALT 27 19  --  13   AST 25 20  --  20   ALBUMIN 4.3 3.3*  --  2.8*   LIPASE 835*  --   --  282*   AMYLASE 432*  --   --  319*   WBC 12.33* 15.22*  --  9.72   HGB 15.1 13.8  --  11.8*   HCT 43.4 40.0  --  34.5*       Nutrition Orders:  Diet Low Fiber/Residue      Appetite/Oral Intake: poor/0-25% of meals  Factors Affecting Nutritional Intake: abdominal pain, nausea, and vomiting  Food/Mormon/Cultural Preferences: none reported  Food Allergies: none reported  Last Bowel Movement: 03/25/24  Wound(s):  Intact    Comments    (3/27) Met with pt. Pt with abdominal pain, nausea and vomiting during interview. Currently NPO. Pt stated symptoms started 2 days ago. Denied diarrhea/constipation today. No difficulties chewing or swallowing. Pt reported her UBW 6 months ago was 165#. Reported wt loss was due to gallbladder issues. Stated her intake at home was not great PTA over past few months. Med/labs reviewed.     (3/28) Pt diet upgraded today to Low fiber/residue. Will monitor tolerance. Amylase and lipase are improving. WBC WNL. Will monitor tolerance to diet upgrade.    Anthropometrics    Height: 5' 1" (154.9 cm), Height Method: Stated  Last Weight: 57.1 kg (125 lb 14.1 oz) (03/26/24 1430), Weight Method: Bed Scale  BMI (Calculated): 23.8  BMI " Classification: normal (BMI 18.5-24.9)     Ideal Body Weight (IBW), Female: 105 lb     % Ideal Body Weight, Female (lb): 119.89 %                    Usual Body Weight (UBW), k kg  % Usual Body Weight: 76.29     Usual Weight Provided By: patient    Wt Readings from Last 5 Encounters:   24 57.1 kg (125 lb 14.1 oz)   23 67.1 kg (148 lb)   22 63 kg (138 lb 14.2 oz)   21 62.4 kg (137 lb 9.1 oz)     Weight Change(s) Since Admission: new admit  Wt Readings from Last 1 Encounters:   24 1430 57.1 kg (125 lb 14.1 oz)   24 1124 57.2 kg (126 lb)   Admit Weight: 57.2 kg (126 lb) (24 1124), Weight Method: Bed Scale    Estimated Needs    Weight Used For Calorie Calculations: 57.1 kg (125 lb 14.1 oz)  Energy Calorie Requirements (kcal): 1713 kcal (30 kcal/kg)  Energy Need Method: Kcal/kg  Weight Used For Protein Calculations: 57.1 kg (125 lb 14.1 oz)  Protein Requirements: 86 gm (1.5 gm/kg)  Fluid Requirements (mL): 1713 ml (1 ml/kcal)    Enteral Nutrition     Patient not receiving enteral nutrition at this time.    Parenteral Nutrition     Patient not receiving parenteral nutrition support at this time.    Evaluation of Received Nutrient Intake    Calories: not meeting estimated needs  Protein: not meeting estimated needs    Patient Education     Not applicable.    Nutrition Diagnosis     PES: Altered GI function related to acute illness as evidenced by dx idiopathic acute pancreatitis (Lipase 835 H & Amylase 432 H). (active)     PES: Moderate chronic disease or condition related malnutrition related to inability to consume sufficient nutrients as evidenced by less than 75% needs met for greater than or equal to 1 month, moderate fat depletion, moderate muscle depletion, and greater than 10% weight loss in 6 months. (active)    Nutrition Interventions     Intervention(s): general/healthful diet, modified rate of parenteral nutrition, and collaboration with other providers    Goal:  Meet greater than 80% of nutritional needs by follow-up. (goal progressing)  Goal: Maintain weight throughout hospitalization. (goal progressing)    Nutrition Goals & Monitoring     Dietitian will monitor: energy intake, weight, electrolyte/renal panel, glucose/endocrine profile, and gastrointestinal profile    Nutrition Risk/Follow-Up: high (follow-up in 1-4 days)   Please consult if re-assessment needed sooner.

## 2024-03-28 NOTE — ASSESSMENT & PLAN NOTE
Chronic, controlled. Latest blood pressure and vitals reviewed-     Temp:  [98.9 °F (37.2 °C)-100.1 °F (37.8 °C)]   Pulse:  []   Resp:  [16-18]   BP: (128-175)/()   SpO2:  [96 %-98 %] .   Home meds for hypertension were reviewed and noted below.   Hypertension Medications               amLODIPine (NORVASC) 5 MG tablet Take 5 mg by mouth.    cloNIDine (CATAPRES) 0.2 MG tablet Take 0.2 mg by mouth every 8 (eight) hours as needed.    losartan (COZAAR) 25 MG tablet Take 25 mg by mouth once daily.            While in the hospital, will manage blood pressure as follows; Adjust home antihypertensive regimen as follows- hold while NPO.    Will utilize p.r.n. blood pressure medication only if patient's blood pressure greater than  155/90 .

## 2024-03-29 VITALS
DIASTOLIC BLOOD PRESSURE: 80 MMHG | HEART RATE: 86 BPM | TEMPERATURE: 99 F | HEIGHT: 61 IN | OXYGEN SATURATION: 97 % | RESPIRATION RATE: 18 BRPM | WEIGHT: 125.88 LBS | BODY MASS INDEX: 23.77 KG/M2 | SYSTOLIC BLOOD PRESSURE: 137 MMHG

## 2024-03-29 PROBLEM — R10.13 EPIGASTRIC ABDOMINAL PAIN: Status: RESOLVED | Noted: 2024-03-26 | Resolved: 2024-03-29

## 2024-03-29 PROBLEM — K85.00 IDIOPATHIC ACUTE PANCREATITIS: Status: RESOLVED | Noted: 2024-03-26 | Resolved: 2024-03-29

## 2024-03-29 LAB
ALBUMIN SERPL-MCNC: 3 G/DL (ref 3.5–5)
ALBUMIN/GLOB SERPL: 0.9 RATIO (ref 1.1–2)
ALP SERPL-CCNC: 75 UNIT/L (ref 40–150)
ALT SERPL-CCNC: 13 UNIT/L (ref 0–55)
AST SERPL-CCNC: 20 UNIT/L (ref 5–34)
BASOPHILS # BLD AUTO: 0.03 X10(3)/MCL
BASOPHILS NFR BLD AUTO: 0.4 %
BILIRUB SERPL-MCNC: 0.8 MG/DL
BUN SERPL-MCNC: <3 MG/DL (ref 9.8–20.1)
CALCIUM SERPL-MCNC: 8.7 MG/DL (ref 8.4–10.2)
CHLORIDE SERPL-SCNC: 103 MMOL/L (ref 98–107)
CO2 SERPL-SCNC: 25 MMOL/L (ref 22–29)
CREAT SERPL-MCNC: 0.58 MG/DL (ref 0.55–1.02)
EOSINOPHIL # BLD AUTO: 0.13 X10(3)/MCL (ref 0–0.9)
EOSINOPHIL NFR BLD AUTO: 1.6 %
ERYTHROCYTE [DISTWIDTH] IN BLOOD BY AUTOMATED COUNT: 13.2 % (ref 11.5–17)
GFR SERPLBLD CREATININE-BSD FMLA CKD-EPI: >60 MLS/MIN/1.73/M2
GLOBULIN SER-MCNC: 3.3 GM/DL (ref 2.4–3.5)
GLUCOSE SERPL-MCNC: 116 MG/DL (ref 74–100)
HCT VFR BLD AUTO: 32.9 % (ref 37–47)
HGB BLD-MCNC: 11.7 G/DL (ref 12–16)
IMM GRANULOCYTES # BLD AUTO: 0.04 X10(3)/MCL (ref 0–0.04)
IMM GRANULOCYTES NFR BLD AUTO: 0.5 %
LIPASE SERPL-CCNC: 109 U/L
LYMPHOCYTES # BLD AUTO: 1.18 X10(3)/MCL (ref 0.6–4.6)
LYMPHOCYTES NFR BLD AUTO: 14.3 %
MCH RBC QN AUTO: 33.6 PG (ref 27–31)
MCHC RBC AUTO-ENTMCNC: 35.6 G/DL (ref 33–36)
MCV RBC AUTO: 94.5 FL (ref 80–94)
MONOCYTES # BLD AUTO: 0.65 X10(3)/MCL (ref 0.1–1.3)
MONOCYTES NFR BLD AUTO: 7.9 %
NEUTROPHILS # BLD AUTO: 6.25 X10(3)/MCL (ref 2.1–9.2)
NEUTROPHILS NFR BLD AUTO: 75.3 %
NRBC BLD AUTO-RTO: 0 %
PLATELET # BLD AUTO: 189 X10(3)/MCL (ref 130–400)
PMV BLD AUTO: 8.9 FL (ref 7.4–10.4)
POTASSIUM SERPL-SCNC: 3.1 MMOL/L (ref 3.5–5.1)
PROT SERPL-MCNC: 6.3 GM/DL (ref 6.4–8.3)
RBC # BLD AUTO: 3.48 X10(6)/MCL (ref 4.2–5.4)
SODIUM SERPL-SCNC: 138 MMOL/L (ref 136–145)
WBC # SPEC AUTO: 8.28 X10(3)/MCL (ref 4.5–11.5)

## 2024-03-29 PROCEDURE — 85025 COMPLETE CBC W/AUTO DIFF WBC: CPT | Performed by: INTERNAL MEDICINE

## 2024-03-29 PROCEDURE — 25000003 PHARM REV CODE 250: Performed by: INTERNAL MEDICINE

## 2024-03-29 PROCEDURE — 80053 COMPREHEN METABOLIC PANEL: CPT | Performed by: INTERNAL MEDICINE

## 2024-03-29 PROCEDURE — 94761 N-INVAS EAR/PLS OXIMETRY MLT: CPT

## 2024-03-29 PROCEDURE — C9113 INJ PANTOPRAZOLE SODIUM, VIA: HCPCS | Performed by: INTERNAL MEDICINE

## 2024-03-29 PROCEDURE — 83690 ASSAY OF LIPASE: CPT | Performed by: INTERNAL MEDICINE

## 2024-03-29 PROCEDURE — 63600175 PHARM REV CODE 636 W HCPCS: Performed by: INTERNAL MEDICINE

## 2024-03-29 RX ORDER — PANTOPRAZOLE SODIUM 40 MG/1
40 TABLET, DELAYED RELEASE ORAL DAILY
Status: DISCONTINUED | OUTPATIENT
Start: 2024-03-30 | End: 2024-03-29 | Stop reason: HOSPADM

## 2024-03-29 RX ORDER — ONDANSETRON 4 MG/1
8 TABLET, ORALLY DISINTEGRATING ORAL EVERY 8 HOURS PRN
Qty: 10 TABLET | Refills: 0 | Status: SHIPPED | OUTPATIENT
Start: 2024-03-29

## 2024-03-29 RX ORDER — POTASSIUM CHLORIDE 7.45 MG/ML
10 INJECTION INTRAVENOUS
Status: COMPLETED | OUTPATIENT
Start: 2024-03-29 | End: 2024-03-29

## 2024-03-29 RX ORDER — POTASSIUM CHLORIDE 750 MG/1
40 TABLET, EXTENDED RELEASE ORAL ONCE
Status: COMPLETED | OUTPATIENT
Start: 2024-03-29 | End: 2024-03-29

## 2024-03-29 RX ORDER — POTASSIUM CHLORIDE 750 MG/1
10 TABLET, EXTENDED RELEASE ORAL ONCE
Status: DISCONTINUED | OUTPATIENT
Start: 2024-03-29 | End: 2024-03-29

## 2024-03-29 RX ADMIN — HYDROMORPHONE HYDROCHLORIDE 1 MG: 2 INJECTION, SOLUTION INTRAMUSCULAR; INTRAVENOUS; SUBCUTANEOUS at 03:03

## 2024-03-29 RX ADMIN — POTASSIUM CHLORIDE 10 MEQ: 7.46 INJECTION, SOLUTION INTRAVENOUS at 03:03

## 2024-03-29 RX ADMIN — POTASSIUM CHLORIDE 10 MEQ: 7.46 INJECTION, SOLUTION INTRAVENOUS at 12:03

## 2024-03-29 RX ADMIN — POTASSIUM CHLORIDE 10 MEQ: 7.46 INJECTION, SOLUTION INTRAVENOUS at 01:03

## 2024-03-29 RX ADMIN — POTASSIUM CHLORIDE 40 MEQ: 750 TABLET, EXTENDED RELEASE ORAL at 11:03

## 2024-03-29 RX ADMIN — LORAZEPAM 1 MG: 2 INJECTION INTRAMUSCULAR; INTRAVENOUS at 03:03

## 2024-03-29 RX ADMIN — LOSARTAN POTASSIUM 25 MG: 25 TABLET, FILM COATED ORAL at 09:03

## 2024-03-29 RX ADMIN — PANTOPRAZOLE SODIUM 40 MG: 40 INJECTION, POWDER, LYOPHILIZED, FOR SOLUTION INTRAVENOUS at 09:03

## 2024-03-29 RX ADMIN — AMLODIPINE BESYLATE 5 MG: 5 TABLET ORAL at 09:03

## 2024-03-29 RX ADMIN — ACETAMINOPHEN 650 MG: 325 TABLET, FILM COATED ORAL at 09:03

## 2024-03-29 NOTE — ASSESSMENT & PLAN NOTE
Admit to inpt.  Aggressive IVF resuscitation. Received 2 L bolus in ED and then started on 250cc/hr. Will decrease rate to 175cc/hr after 3rd liter is completed.  PRN analgesics, antiemetics.  Reconciled home meds. PRN IV antihypertensives.  NPO.   AM labs.    3/27/24: Pt has made some clinical improvement. WBC has increased. Bicarb has decreased. Will run UA, blood cx. Continue to monitor closely for signs of infection. Will offer soft low fat, low residue diet if she can tolerate later today. Will DC NS and start 0.45NS with D5W and 20KCl at 125cc.hr. bmp this afternoon. AM labs.  UA unremarkable.   3/28/24: Labs improving. Pt will try a diet.   This afternoon. Pt tolerated PO. Will finish off this bag of IVF and then DC. If pt becomes nauseated and can't tolerate PO we can restart fluids.  3/29/24:

## 2024-03-29 NOTE — ASSESSMENT & PLAN NOTE
Chronic, controlled. Latest blood pressure and vitals reviewed-     Temp:  [98 °F (36.7 °C)-99.5 °F (37.5 °C)]   Pulse:  []   Resp:  [16-18]   BP: (128-155)/(75-88)   SpO2:  [95 %-99 %] .   Home meds for hypertension were reviewed and noted below.   Hypertension Medications               amLODIPine (NORVASC) 5 MG tablet Take 5 mg by mouth.    cloNIDine (CATAPRES) 0.2 MG tablet Take 0.2 mg by mouth every 8 (eight) hours as needed.    losartan (COZAAR) 25 MG tablet Take 25 mg by mouth once daily.            While in the hospital, will manage blood pressure as follows; Adjust home antihypertensive regimen as follows- hold while NPO.    Will utilize p.r.n. blood pressure medication only if patient's blood pressure greater than  155/90 .

## 2024-03-29 NOTE — DISCHARGE SUMMARY
"[facility]  Hospital Medicine  Discharge Summary      Patient Name: Shu Mcclure  MRN: [id number]  [facility]: [id number]  Patient Class: IP- Inpatient  Admission Date: 3/26/2024  Hospital Length of Stay: 3 days  Discharge Date and Time:  03/29/2024 12:26 PM  Attending Physician: Britany Reddy DO   Discharging Provider: BRITANY VEGA DO  Primary Care Provider: Kd Ocampo MD    Primary Care Team: Networked reference to record PCT     HPI:   54yo female presented to the ED 3/26/24 with complaints of LUQ and epigastric pain and N/V/D since yesterday. PMH HTN, GERD, and cholecystectomy 3-4 months ago. She had diarrhea yesterday. None today. Had a BM in the ED. ED dr montes de oca order C diff PCR. Vitals significant for HTN. All other vitals unremarkable. Labs significant for amylase 432, lipase 835, WBC 12.33, K 3.0. BUN, Cr, H/H all within normal limits. Chest xr and abd xr unremarkable. CT abd/pelvis revealed "Findings suggestive of acute pancreatitis with peripancreatic inflammatory changes and fluid. No drainable fluid collection or free air. Diffuse hepatic steatosis."  Pt was given 2 Liter NS bolus is ED and then started on NS 250cc/hr, along with zofran, meperidine, Kcl. Hospital medicine was consulted for admission for continue treatment.    Upon examination, pt endorses epigastric and ruq abd pain, nausea. States pain is 20/10. States pain and nausea meds in ER did not help. Endorses chronic right knee pain for which she was scheduled to have a knee surgery for today. Due to her knee pain, she falls at home often. Denies any confusion, dizziness, SOB, or chest pain.Quit smoking cigarettes 10 years ago.    * No surgery found *      Hospital Course:   3/27/24: Pt states that she has not vomited since yesterday. She states that her abd pain has improved slightly. She has been receiving IV opioids every 2-4 hours. Pt again denies alcohol use recently. States she used to " drink alcohol regularly years ago, but now only drinks socially and has not drank socially, recently. States she has a headache. Feels her appetite may be starting to come back. She has urinated without issues, however, reports that she was recently dx with vaginitis and UTI 2/17/24 and was unable to tolerate PO abx so she was given a shot of abx by her PCP. WBC has increased this AM. Denies any dysuria, frequency, hesitancy, or current hematuria. Had hematuria in 2/2024 and was referred to urology for this. She has not seen urology. She also has chronic GI issues with chronic abdominal pain in lower quadrants, which is still present now. States she was referred to GI for EGD and colonoscopy, but has not had this done, yet. She had a colonoscopy 10 yrs ago. She also has chronic back pain with disc sclerosis per pt. Shortly after I left the room, the pt received meperedine and became nauseous and vomited.     3/28/24: Pt is feeling better today. States that her abd pain is more of a soreness, now.Denies current N/V/D. States she has not had a bm. Has been NPO. Feels she could try something by mouth today.    3/29/24: Pt feeling much better this AM. She is sitting up in bed eating lunch. She at some for breakfast and is tolerating her lunch well. She spit out the Kcl tablets due to fear it would upset her stomach. Took other mes by mouth with no issues. Wants Kcl IV rather than PO. After 40meq Kcl IV will DC home with rx for prn zofran per pt request. She is not experiencing any nausea at this time. She asked about pain meds. I rec she follow-up with her pcp/pain mgt specialist for her chronic pain meds. She voiced understanding. Tylenol and gas x otc prn upon dc.    PE:   General: alert and oriented.  HEENT: NC, AT.  Resp: CTAB.  Cardio: RRR, no m/r/g. No edema.  Abd: soft, mildly tender to moderate to deep palpation of LUQ. Pt has chronic abd pain. ND, + BS x 4       Goals of Care Treatment Preferences:  Code  Status: Full Code      Consults:     Cardiac/Vascular  HTN (hypertension)  Chronic, controlled. Latest blood pressure and vitals reviewed-     Temp:  [98 °F (36.7 °C)-99.5 °F (37.5 °C)]   Pulse:  []   Resp:  [16-18]   BP: (128-155)/(75-88)   SpO2:  [95 %-99 %] .   Home meds for hypertension were reviewed and noted below.   Hypertension Medications               amLODIPine (NORVASC) 5 MG tablet Take 5 mg by mouth.    cloNIDine (CATAPRES) 0.2 MG tablet Take 0.2 mg by mouth every 8 (eight) hours as needed.    losartan (COZAAR) 25 MG tablet Take 25 mg by mouth once daily.            While in the hospital, will manage blood pressure as follows; Adjust home antihypertensive regimen as follows- hold while NPO.    Will utilize p.r.n. blood pressure medication only if patient's blood pressure greater than  155/90 .    Endocrine  Moderate protein-calorie malnutrition  Nutrition consulted. Most recent weight and BMI monitored-     Measurements:  Wt Readings from Last 1 Encounters:   03/26/24 57.1 kg (125 lb 14.1 oz)   Body mass index is 23.79 kg/m².    Patient has been screened and assessed by RD.    Malnutrition Type:  Context: chronic illness  Level: moderate    Malnutrition Characteristic Summary:  Weight Loss (Malnutrition): greater than 10% in 6 months  Energy Intake (Malnutrition): less than 75% for greater than or equal to 3 months  Subcutaneous Fat (Malnutrition): moderate depletion  Muscle Mass (Malnutrition): moderate depletion    Interventions/Recommendations (treatment strategy):         GI  * Idiopathic acute pancreatitis-resolved as of 3/29/2024  Admit to inpt.  Aggressive IVF resuscitation. Received 2 L bolus in ED and then started on 250cc/hr. Will decrease rate to 175cc/hr after 3rd liter is completed.  PRN analgesics, antiemetics.  Reconciled home meds. PRN IV antihypertensives.  NPO.   AM labs.    3/27/24: Pt has made some clinical improvement. WBC has increased. Bicarb has decreased. Will run UA,  blood cx. Continue to monitor closely for signs of infection. Will offer soft low fat, low residue diet if she can tolerate later today. Will DC NS and start 0.45NS with D5W and 20KCl at 125cc.hr. bmp this afternoon. AM labs.  UA unremarkable.   3/28/24: Labs improving. Pt will try a diet.   This afternoon. Pt tolerated PO. Will finish off this bag of IVF and then DC. If pt becomes nauseated and can't tolerate PO we can restart fluids.  3/29/24:     GERD (gastroesophageal reflux disease)  Continue home pantoprazole.        Final Active Diagnoses:    Diagnosis Date Noted POA    Moderate protein-calorie malnutrition [E44.0] 03/27/2024 Yes    HTN (hypertension) [I10] 03/26/2024 Yes    GERD (gastroesophageal reflux disease) [K21.9] 03/26/2024 Yes      Problems Resolved During this Admission:    Diagnosis Date Noted Date Resolved POA    PRINCIPAL PROBLEM:  Idiopathic acute pancreatitis [K85.00] 03/26/2024 03/29/2024 Yes    Epigastric abdominal pain [R10.13] 03/26/2024 03/29/2024 Yes       Discharged Condition: good    Disposition: home    Follow Up:   Follow-up Information       Kd Ocampo MD Follow up in 3 day(s).    Specialty: Family Medicine  Contact information:  [address] [place], [place] [address] [address] [phone]                           Patient Instructions:   No discharge procedures on file.    Significant Diagnostic Studies: Labs: All labs within the past 24 hours have been reviewed    Pending Diagnostic Studies:       None           Medications:  Reconciled Home Medications:      Medication List        CHANGE how you take these medications      ondansetron 4 MG Tbdl  Commonly known as: ZOFRAN-ODT  Take 2 tablets (8 mg total) by mouth every 8 (eight) hours as needed (nausea/vomiting).  What changed: reasons to take this            CONTINUE taking these medications      amLODIPine 5 MG tablet  Commonly known as: NORVASC  Take 5 mg by mouth.     baclofen 10 MG tablet  Commonly  known as: LIORESAL  Take 5 mg by mouth 3 (three) times daily as needed.     cloNIDine 0.2 MG tablet  Commonly known as: CATAPRES  Take 0.2 mg by mouth every 8 (eight) hours as needed.     EScitalopram oxalate 10 MG tablet  Commonly known as: LEXAPRO  Take 10 mg by mouth.     ibuprofen 800 MG tablet  Commonly known as: ADVIL,MOTRIN  Take 800 mg by mouth.     LORazepam 1 MG tablet  Commonly known as: ATIVAN  Take 1 mg by mouth 2 (two) times daily as needed for Anxiety.     losartan 25 MG tablet  Commonly known as: COZAAR  Take 25 mg by mouth once daily.     pantoprazole 40 MG tablet  Commonly known as: PROTONIX  Take 40 mg by mouth once daily.     traMADoL 50 mg tablet  Commonly known as: ULTRAM  Take 2 tablets (100 mg total) by mouth every 12 (twelve) hours as needed for Pain.     traZODone 100 MG tablet  Commonly known as: DESYREL  Take 1 tablet (100 mg total) by mouth every evening.            STOP taking these medications      oseltamivir 75 MG capsule  Commonly known as: TAMIFLU              Indwelling Lines/Drains at time of discharge:   Lines/Drains/Airways       None                   Time spent on the discharge of patient: 45 minutes         OZ VEGA DO  Department of Hospital Medicine  [facility]

## 2024-03-29 NOTE — SUBJECTIVE & OBJECTIVE
Past Medical History:   Diagnosis Date    GERD (gastroesophageal reflux disease)     Hypertension        History reviewed. No pertinent surgical history.    Review of patient's allergies indicates:  No Known Allergies    No current facility-administered medications on file prior to encounter.     Current Outpatient Medications on File Prior to Encounter   Medication Sig    amLODIPine (NORVASC) 5 MG tablet Take 5 mg by mouth.    baclofen (LIORESAL) 10 MG tablet Take 5 mg by mouth 3 (three) times daily as needed.    cloNIDine (CATAPRES) 0.2 MG tablet Take 0.2 mg by mouth every 8 (eight) hours as needed.    LORazepam (ATIVAN) 1 MG tablet Take 1 mg by mouth 2 (two) times daily as needed for Anxiety.    losartan (COZAAR) 25 MG tablet Take 25 mg by mouth once daily.    oseltamivir (TAMIFLU) 75 MG capsule TAKE 1 CAPSULE BY MOUTH TWICE A DAY FOR 5 DAYS    pantoprazole (PROTONIX) 40 MG tablet Take 40 mg by mouth once daily.    EScitalopram oxalate (LEXAPRO) 10 MG tablet Take 10 mg by mouth.    ibuprofen (ADVIL,MOTRIN) 800 MG tablet Take 800 mg by mouth.    ondansetron (ZOFRAN-ODT) 4 MG TbDL Take 8 mg by mouth every 8 (eight) hours as needed.    traMADoL (ULTRAM) 50 mg tablet Take 2 tablets (100 mg total) by mouth every 12 (twelve) hours as needed for Pain.    traZODone (DESYREL) 100 MG tablet Take 1 tablet (100 mg total) by mouth every evening.     Family History    None       Tobacco Use    Smoking status: Never    Smokeless tobacco: Never   Substance and Sexual Activity    Alcohol use: Never    Drug use: Never    Sexual activity: Not on file     Review of Systems   Constitutional:  Positive for activity change. Negative for appetite change, diaphoresis and fever.   Respiratory:  Negative for chest tightness and shortness of breath.    Cardiovascular:  Negative for chest pain and leg swelling.   Gastrointestinal:  Positive for abdominal pain. Negative for abdominal distention, constipation, diarrhea, nausea and vomiting.    Musculoskeletal:  Positive for arthralgias and gait problem.   Skin:  Negative for rash and wound.   Neurological:  Negative for dizziness, weakness and headaches.     Objective:     Vital Signs (Most Recent):  Temp: 98.9 °F (37.2 °C) (03/29/24 0804)  Pulse: 77 (03/29/24 0804)  Resp: 18 (03/29/24 0804)  BP: 128/75 (03/29/24 0905)  SpO2: 97 % (03/29/24 0804) Vital Signs (24h Range):  Temp:  [98 °F (36.7 °C)-99.5 °F (37.5 °C)] 98.9 °F (37.2 °C)  Pulse:  [] 77  Resp:  [16-18] 18  SpO2:  [95 %-99 %] 97 %  BP: (128-155)/(75-88) 128/75     Weight: 57.1 kg (125 lb 14.1 oz)  Body mass index is 23.79 kg/m².     Physical Exam  Vitals and nursing note reviewed.   Constitutional:       General: She is not in acute distress.     Appearance: Normal appearance.   HENT:      Head: Normocephalic and atraumatic.      Nose: Nose normal.   Eyes:      Conjunctiva/sclera: Conjunctivae normal.   Cardiovascular:      Rate and Rhythm: Normal rate and regular rhythm.      Pulses: Normal pulses.      Heart sounds: Normal heart sounds. No murmur heard.     No gallop.   Pulmonary:      Effort: Pulmonary effort is normal.      Breath sounds: Normal breath sounds. No wheezing, rhonchi or rales.   Abdominal:      General: Bowel sounds are normal. There is no distension.      Palpations: Abdomen is soft.      Tenderness: There is abdominal tenderness. There is no guarding.      Comments: LUQ upon moderate/deep palpation   Musculoskeletal:         General: No swelling or deformity. Normal range of motion.      Cervical back: Normal range of motion and neck supple.      Right lower leg: No edema.      Left lower leg: No edema.   Skin:     General: Skin is warm and dry.      Findings: No rash.   Neurological:      General: No focal deficit present.      Mental Status: She is alert and oriented to person, place, and time. Mental status is at baseline.      Gait: Gait normal.   Psychiatric:         Mood and Affect: Mood normal.         Thought  Content: Thought content normal.         Judgment: Judgment normal.                Significant Labs: All pertinent labs within the past 24 hours have been reviewed.  Recent Lab Results         03/29/24  0450        Albumin/Globulin Ratio 0.9       Albumin 3.0       ALP 75       ALT 13       AST 20       Baso # 0.03       Basophil % 0.4       BILIRUBIN TOTAL 0.8       BUN <3.0       Calcium 8.7       Chloride 103       CO2 25       Creatinine 0.58       eGFR >60       Eos # 0.13       Eos % 1.6       Globulin, Total 3.3       Glucose 116       Hematocrit 32.9       Hemoglobin 11.7       Immature Grans (Abs) 0.04       Immature Granulocytes 0.5       Lipase 109       Lymph # 1.18       LYMPH % 14.3       MCH 33.6       MCHC 35.6       MCV 94.5       Mono # 0.65       Mono % 7.9       MPV 8.9       Neut # 6.25       Neut % 75.3       nRBC 0.0       Platelet Count 189       Potassium 3.1       PROTEIN TOTAL 6.3       RBC 3.48       RDW 13.2       Sodium 138       WBC 8.28               Significant Imaging: I have reviewed all pertinent imaging results/findings within the past 24 hours.

## 2024-03-29 NOTE — DISCHARGE INSTRUCTIONS
Pt to D/C home in stable condition.    Symptoms to look out for: nausea, vomiting, fever, persistent abdominal pain.    Call your primary care or go to your nearest ER if any of these symptoms occur.

## 2024-03-29 NOTE — PLAN OF CARE
Problem: Adult Inpatient Plan of Care  Goal: Plan of Care Review  3/29/2024 1356 by Solomon Trinh RN  Outcome: Met  3/29/2024 1356 by Solomon Trinh RN  Outcome: Ongoing, Progressing  Goal: Patient-Specific Goal (Individualized)  3/29/2024 1356 by Solomon Trinh RN  Outcome: Met  3/29/2024 1356 by Solomon Trinh RN  Outcome: Ongoing, Progressing  Goal: Absence of Hospital-Acquired Illness or Injury  3/29/2024 1356 by Solomon Trihn RN  Outcome: Met  3/29/2024 1356 by Solomon Trinh RN  Outcome: Ongoing, Progressing  Goal: Optimal Comfort and Wellbeing  3/29/2024 1356 by Solomon Trinh RN  Outcome: Met  3/29/2024 1356 by Solomon Trinh RN  Outcome: Ongoing, Progressing  Goal: Readiness for Transition of Care  3/29/2024 1356 by Solomon Trinh RN  Outcome: Met  3/29/2024 1356 by Solomon Trinh RN  Outcome: Ongoing, Progressing     Problem: Fall Injury Risk  Goal: Absence of Fall and Fall-Related Injury  3/29/2024 1356 by Solomon Trinh RN  Outcome: Met  3/29/2024 1356 by Solomon Trinh RN  Outcome: Ongoing, Progressing     Problem: Pain Acute  Goal: Acceptable Pain Control and Functional Ability  3/29/2024 1356 by Solomon Trinh RN  Outcome: Met  3/29/2024 1356 by Solomon Trinh RN  Outcome: Ongoing, Progressing     Problem: Fatigue  Goal: Improved Activity Tolerance  3/29/2024 1356 by Solomon Trinh RN  Outcome: Met  3/29/2024 1356 by Solomon Trinh RN  Outcome: Ongoing, Progressing     Problem: Infection  Goal: Absence of Infection Signs and Symptoms  3/29/2024 1356 by Solomon Trinh RN  Outcome: Met  3/29/2024 1356 by Solomon Trinh RN  Outcome: Ongoing, Progressing     Problem: Fluid Imbalance (Pancreatitis)  Goal: Fluid Balance  3/29/2024 1356 by Solomon Trinh RN  Outcome: Met  3/29/2024 1356 by Solomon Trinh RN  Outcome: Ongoing, Progressing     Problem: Infection (Pancreatitis)  Goal: Infection Symptom Resolution  3/29/2024 1356 by Solomon Trinh, RN  Outcome: Met  3/29/2024 1356 by Solomon Trinh,  RN  Outcome: Ongoing, Progressing     Problem: Nutrition Impaired (Pancreatitis)  Goal: Optimal Nutrition Intake  3/29/2024 1356 by Solomon Trinh RN  Outcome: Met  3/29/2024 1356 by Solomon Trinh RN  Outcome: Ongoing, Progressing     Problem: Pain (Pancreatitis)  Goal: Acceptable Pain Control  3/29/2024 1356 by Solomon Trinh RN  Outcome: Met  3/29/2024 1356 by Solomon Trinh RN  Outcome: Ongoing, Progressing     Problem: Respiratory Compromise (Pancreatitis)  Goal: Effective Oxygenation and Ventilation  3/29/2024 1356 by Solomon Trinh RN  Outcome: Met  3/29/2024 1356 by Solomon Trinh RN  Outcome: Ongoing, Progressing     Problem: Hypertension Comorbidity  Goal: Blood Pressure in Desired Range  3/29/2024 1356 by Solomon Trinh RN  Outcome: Met  3/29/2024 1356 by Solomon Trinh RN  Outcome: Ongoing, Progressing     Problem: Osteoarthritis Comorbidity  Goal: Maintenance of Osteoarthritis Symptom Control  3/29/2024 1356 by Solomon Trinh RN  Outcome: Met  3/29/2024 1356 by Solomon Trinh RN  Outcome: Ongoing, Progressing

## 2024-04-01 LAB
BACTERIA BLD CULT: NORMAL
BACTERIA BLD CULT: NORMAL

## 2024-04-03 NOTE — PROGRESS NOTES
Physical Therapy Treatment Note     Name: Shu Mcclure  Clinic Number: [id number]    Therapy Diagnosis:   Encounter Diagnoses   Name Primary?    Unilateral primary osteoarthritis, unspecified knee Yes    Stiffness of right knee, not elsewhere classified     Chronic pain of right knee     Impaired functional mobility, balance, gait, and endurance        Physician: Matthias Anderson, DAYAMIP-C    Visit Date: 4/4/2024     Physician Orders: PT Eval and Treat  Medical Diagnosis from Referral: Unilateral primary osteoarthritis right knee  Evaluation Date: 3/20/2024  Authorization Period Expiration: 5/1/24  Plan of Care Expiration: 6/20/2024  Visit # / Visits authorized: 1/12     Time In: 1345  Time Out: 1425  Total Billable Time: 40 minutes     Surgery: None  Orthopedic Precautions: None  Pertinent History: History of bilateral hip pain    Subjective     Patient reports: No change to condition, right knee pain remains severe. She has been doing HEP with help from her daughter. She fell while walking, knees buckled/gave out on her.    CMS Impairment/Limitation/Restriction for FOTO Survey  Therapist reviewed FOTO scores for Shu Mcclure on 3/20/2024. FOTO documents entered into [employer] - see Media section.  FOTO filled out by: Patient  Patient's Physical FS Primary Measure: 50  Risk Adjusted Statistical FOTO: 43 (predicted 60 by visit #13)  Limitation Score: 50%  Category: Mobility  KOOS, JR: 59.4% functional    Objective     Shu received the following treatment:     Time Activities   Manual     TherAct     TherEx 40 min NuStep    BFR - QS, SAQ, hip add ball squeeze, bridge (too painful), supine band clams    Passive hamstrings stretch       Gait     Neuro Re-ed     Modalities     E-Stim     Dry Needling     Canalith Repositioning           Home Exercises Provided and Patient Education Provided     Education provided:   -Plan of care, HEP    Assessment     She presents with arthritic knee pain with subsequent  severe LE weakness. Generalized strengthening program to begin, as she has a fair degree on difficulty with open chain exercises.    Patient prognosis is Guarded.      Anticipated barriers to physical therapy: Degree of pain, proximal joint pathology bilaterally    Goals: Shu is working towards her goals.  Short Term Goals: 6 weeks   Patient will report at least 5% increase in functional capacity from initial KOOS, JR score to indicate clinically significant functional improvement  Patient will report at least 5 point increase on initial FOTO score to indicate clinically significant functional improvement     Long Term Goals: 12 weeks   Patient will report at least 10% increase in functional capacity from initial KOOS, JR score to indicate clinically significant functional improvement  Patient will report at least 10 point increase on initial FOTO score to indicate clinically significant functional improvement    Plan     2-3x/week x 12 weeks    Gem Givens, PT

## 2024-04-04 ENCOUNTER — CLINICAL SUPPORT (OUTPATIENT)
Dept: REHABILITATION | Facility: HOSPITAL | Age: 54
End: 2024-04-04
Payer: MEDICAID

## 2024-04-04 DIAGNOSIS — M17.10 UNILATERAL PRIMARY OSTEOARTHRITIS, UNSPECIFIED KNEE: Primary | ICD-10-CM

## 2024-04-04 DIAGNOSIS — Z74.09 IMPAIRED FUNCTIONAL MOBILITY, BALANCE, GAIT, AND ENDURANCE: ICD-10-CM

## 2024-04-04 DIAGNOSIS — G89.29 CHRONIC PAIN OF RIGHT KNEE: ICD-10-CM

## 2024-04-04 DIAGNOSIS — M25.561 CHRONIC PAIN OF RIGHT KNEE: ICD-10-CM

## 2024-04-04 DIAGNOSIS — M25.661 STIFFNESS OF RIGHT KNEE, NOT ELSEWHERE CLASSIFIED: ICD-10-CM

## 2024-04-04 PROCEDURE — 97110 THERAPEUTIC EXERCISES: CPT

## 2024-04-10 NOTE — PROGRESS NOTES
Physical Therapy Treatment Note     Name: Shu Mcclure  Clinic Number: [id number]    Therapy Diagnosis:   Encounter Diagnoses   Name Primary?    Stiffness of right knee, not elsewhere classified Yes    Chronic pain of right knee     Impaired functional mobility, balance, gait, and endurance        Physician: Matthias Anderson, DAYAMIP-C    Visit Date: 4/11/2024     Physician Orders: PT Eval and Treat  Medical Diagnosis from Referral: Unilateral primary osteoarthritis right knee  Evaluation Date: 3/20/2024  Authorization Period Expiration: 5/1/24  Plan of Care Expiration: 6/20/2024  Visit # / Visits authorized: 2/12     Time In: 1346  Time Out: 1416  Total Billable Time: 30 minutes     Surgery: None  Orthopedic Precautions: None  Pertinent History: History of bilateral hip pain    Subjective     Patient reports: No change to condition, right knee pain remains severe. She went to [facility] and had a shot but it made the knee hurt worse. She goes for another appointment in 3 weeks. She has been doing HEP with help from her daughter. She fell while walking, knees buckled/gave out on her.    CMS Impairment/Limitation/Restriction for FOTO Survey  Therapist reviewed FOTO scores for Shu Mcclure on 3/20/2024. FOTO documents entered into [employer] - see Media section.  FOTO filled out by: Patient  Patient's Physical FS Primary Measure: 50  Risk Adjusted Statistical FOTO: 43 (predicted 60 by visit #13)  Limitation Score: 50%  Category: Mobility  KOOS, JR: 59.4% functional    Objective     Shu received the following treatment:     Time Activities   Manual     TherAct     TherEx 30 min NuStep    SLR, SAQ, hip add ball squeeze, supine band clams    Passive hamstrings stretch       Gait     Neuro Re-ed     Modalities     E-Stim     Dry Needling     Canalith Repositioning           Home Exercises Provided and Patient Education Provided     Education provided:   -Plan of care, HEP    Assessment     She presents with arthritic  knee pain with subsequent severe LE weakness. Generalized strengthening program to begin, as she has a fair degree on difficulty with open chain exercises.    Patient prognosis is Guarded.      Anticipated barriers to physical therapy: Degree of pain, proximal joint pathology bilaterally    Goals: Shu is working towards her goals.  Short Term Goals: 6 weeks   Patient will report at least 5% increase in functional capacity from initial KOOS, JR score to indicate clinically significant functional improvement  Patient will report at least 5 point increase on initial FOTO score to indicate clinically significant functional improvement     Long Term Goals: 12 weeks   Patient will report at least 10% increase in functional capacity from initial KOOS, JR score to indicate clinically significant functional improvement  Patient will report at least 10 point increase on initial FOTO score to indicate clinically significant functional improvement    Plan     2-3x/week x 12 weeks    Gem Givens, PT

## 2024-04-11 ENCOUNTER — CLINICAL SUPPORT (OUTPATIENT)
Dept: REHABILITATION | Facility: HOSPITAL | Age: 54
End: 2024-04-11
Payer: MEDICAID

## 2024-04-11 DIAGNOSIS — M25.661 STIFFNESS OF RIGHT KNEE, NOT ELSEWHERE CLASSIFIED: Primary | ICD-10-CM

## 2024-04-11 DIAGNOSIS — M25.561 CHRONIC PAIN OF RIGHT KNEE: ICD-10-CM

## 2024-04-11 DIAGNOSIS — Z74.09 IMPAIRED FUNCTIONAL MOBILITY, BALANCE, GAIT, AND ENDURANCE: ICD-10-CM

## 2024-04-11 DIAGNOSIS — G89.29 CHRONIC PAIN OF RIGHT KNEE: ICD-10-CM

## 2024-04-11 PROCEDURE — 97110 THERAPEUTIC EXERCISES: CPT

## 2024-04-12 ENCOUNTER — HOSPITAL ENCOUNTER (EMERGENCY)
Facility: HOSPITAL | Age: 54
Discharge: HOME | End: 2024-04-12
Attending: GENERAL PRACTICE
Payer: MEDICAID

## 2024-04-12 VITALS
TEMPERATURE: 99 F | RESPIRATION RATE: 16 BRPM | DIASTOLIC BLOOD PRESSURE: 97 MMHG | HEART RATE: 98 BPM | OXYGEN SATURATION: 98 % | WEIGHT: 139 LBS | BODY MASS INDEX: 22.34 KG/M2 | HEIGHT: 66 IN | SYSTOLIC BLOOD PRESSURE: 150 MMHG

## 2024-04-12 DIAGNOSIS — S21.112A STAB WOUND OF LEFT CHEST, INITIAL ENCOUNTER: Primary | ICD-10-CM

## 2024-04-12 LAB
ALBUMIN SERPL-MCNC: 4.3 G/DL (ref 3.5–5)
ALBUMIN/GLOB SERPL: 1.1 RATIO (ref 1.1–2)
ALP SERPL-CCNC: 110 UNIT/L (ref 40–150)
ALT SERPL-CCNC: 24 UNIT/L (ref 0–55)
AMORPH URATE CRY URNS QL MICRO: ABNORMAL /HPF
AMPHET UR QL SCN: NEGATIVE
APPEARANCE UR: CLEAR
APTT PPP: 21.4 SECONDS (ref 21.4–28.3)
AST SERPL-CCNC: 23 UNIT/L (ref 5–34)
B-HCG SERPL QL: NEGATIVE
BACTERIA #/AREA URNS AUTO: ABNORMAL /HPF
BARBITURATE SCN PRESENT UR: NEGATIVE
BASOPHILS # BLD AUTO: 0.05 X10(3)/MCL
BASOPHILS NFR BLD AUTO: 0.5 %
BENZODIAZ UR QL SCN: NEGATIVE
BILIRUB SERPL-MCNC: 0.2 MG/DL
BILIRUB UR QL STRIP.AUTO: NEGATIVE
BUN SERPL-MCNC: 11 MG/DL (ref 9.8–20.1)
CALCIUM SERPL-MCNC: 10 MG/DL (ref 8.4–10.2)
CANNABINOIDS UR QL SCN: NEGATIVE
CHLORIDE SERPL-SCNC: 107 MMOL/L (ref 98–107)
CO2 SERPL-SCNC: 14 MMOL/L (ref 22–29)
COCAINE UR QL SCN: NEGATIVE
COLOR UR AUTO: YELLOW
CREAT SERPL-MCNC: 0.93 MG/DL (ref 0.55–1.02)
EOSINOPHIL # BLD AUTO: 0.01 X10(3)/MCL (ref 0–0.9)
EOSINOPHIL NFR BLD AUTO: 0.1 %
ERYTHROCYTE [DISTWIDTH] IN BLOOD BY AUTOMATED COUNT: 13.9 % (ref 11.5–17)
FENTANYL UR QL SCN: NEGATIVE
GFR SERPLBLD CREATININE-BSD FMLA CKD-EPI: >60 MLS/MIN/1.73/M2
GLOBULIN SER-MCNC: 3.9 GM/DL (ref 2.4–3.5)
GLUCOSE SERPL-MCNC: 141 MG/DL (ref 74–100)
GLUCOSE UR QL STRIP.AUTO: NEGATIVE
GRAN CASTS URNS QL MICRO: ABNORMAL /LPF
HCT VFR BLD AUTO: 40.5 % (ref 37–47)
HGB BLD-MCNC: 13.9 G/DL (ref 12–16)
IMM GRANULOCYTES # BLD AUTO: 0.07 X10(3)/MCL (ref 0–0.04)
IMM GRANULOCYTES NFR BLD AUTO: 0.6 %
INR PPP: 0.9
KETONES UR QL STRIP.AUTO: NEGATIVE
LEUKOCYTE ESTERASE UR QL STRIP.AUTO: ABNORMAL
LYMPHOCYTES # BLD AUTO: 1.08 X10(3)/MCL (ref 0.6–4.6)
LYMPHOCYTES NFR BLD AUTO: 9.7 %
MCH RBC QN AUTO: 33.6 PG (ref 27–31)
MCHC RBC AUTO-ENTMCNC: 34.3 G/DL (ref 33–36)
MCV RBC AUTO: 97.8 FL (ref 80–94)
MDMA UR QL SCN: NEGATIVE
MONOCYTES # BLD AUTO: 0.16 X10(3)/MCL (ref 0.1–1.3)
MONOCYTES NFR BLD AUTO: 1.4 %
NEUTROPHILS # BLD AUTO: 9.73 X10(3)/MCL (ref 2.1–9.2)
NEUTROPHILS NFR BLD AUTO: 87.7 %
NITRITE UR QL STRIP.AUTO: NEGATIVE
NRBC BLD AUTO-RTO: 0 %
OPIATES UR QL SCN: NEGATIVE
PCP UR QL: NEGATIVE
PH UR STRIP.AUTO: 5.5 [PH]
PH UR: 5.5 [PH] (ref 3–11)
PLATELET # BLD AUTO: 601 X10(3)/MCL (ref 130–400)
PMV BLD AUTO: 8.3 FL (ref 7.4–10.4)
POTASSIUM SERPL-SCNC: 3.8 MMOL/L (ref 3.5–5.1)
PROT SERPL-MCNC: 8.2 GM/DL (ref 6.4–8.3)
PROT UR QL STRIP.AUTO: ABNORMAL
PROTHROMBIN TIME: 9.5 SECONDS (ref 9.1–10.9)
RBC # BLD AUTO: 4.14 X10(6)/MCL (ref 4.2–5.4)
RBC #/AREA URNS AUTO: ABNORMAL /HPF
RBC UR QL AUTO: ABNORMAL
RENAL EPI CELLS #/AREA UR COMP ASSIST: ABNORMAL /HPF
SODIUM SERPL-SCNC: 140 MMOL/L (ref 136–145)
SP GR UR STRIP.AUTO: 1.01 (ref 1–1.03)
SPECIFIC GRAVITY, URINE AUTO (.000) (OHS): 1.01 (ref 1–1.03)
SQUAMOUS #/AREA URNS AUTO: ABNORMAL /HPF
T VAGINALIS URNS QL MICRO: ABNORMAL /HPF
UROBILINOGEN UR STRIP-ACNC: 0.2
WBC # SPEC AUTO: 11.1 X10(3)/MCL (ref 4.5–11.5)
WBC #/AREA URNS AUTO: ABNORMAL /HPF

## 2024-04-12 PROCEDURE — 85730 THROMBOPLASTIN TIME PARTIAL: CPT | Performed by: GENERAL PRACTICE

## 2024-04-12 PROCEDURE — 81003 URINALYSIS AUTO W/O SCOPE: CPT | Performed by: GENERAL PRACTICE

## 2024-04-12 PROCEDURE — 85025 COMPLETE CBC W/AUTO DIFF WBC: CPT | Performed by: GENERAL PRACTICE

## 2024-04-12 PROCEDURE — 90714 TD VACC NO PRESV 7 YRS+ IM: CPT | Performed by: GENERAL PRACTICE

## 2024-04-12 PROCEDURE — 85610 PROTHROMBIN TIME: CPT | Performed by: GENERAL PRACTICE

## 2024-04-12 PROCEDURE — 81025 URINE PREGNANCY TEST: CPT | Performed by: GENERAL PRACTICE

## 2024-04-12 PROCEDURE — 90471 IMMUNIZATION ADMIN: CPT | Performed by: GENERAL PRACTICE

## 2024-04-12 PROCEDURE — 96375 TX/PRO/DX INJ NEW DRUG ADDON: CPT | Mod: 59

## 2024-04-12 PROCEDURE — 96361 HYDRATE IV INFUSION ADD-ON: CPT

## 2024-04-12 PROCEDURE — 99285 EMERGENCY DEPT VISIT HI MDM: CPT | Mod: 25

## 2024-04-12 PROCEDURE — 80307 DRUG TEST PRSMV CHEM ANLYZR: CPT | Performed by: GENERAL PRACTICE

## 2024-04-12 PROCEDURE — 96374 THER/PROPH/DIAG INJ IV PUSH: CPT | Mod: 59

## 2024-04-12 PROCEDURE — 80053 COMPREHEN METABOLIC PANEL: CPT | Performed by: GENERAL PRACTICE

## 2024-04-12 PROCEDURE — 25500020 PHARM REV CODE 255: Performed by: GENERAL PRACTICE

## 2024-04-12 PROCEDURE — 63600175 PHARM REV CODE 636 W HCPCS: Performed by: GENERAL PRACTICE

## 2024-04-12 RX ORDER — HYDROCODONE BITARTRATE AND ACETAMINOPHEN 5; 325 MG/1; MG/1
1 TABLET ORAL EVERY 4 HOURS PRN
Qty: 20 TABLET | Refills: 0 | Status: SHIPPED | OUTPATIENT
Start: 2024-04-12

## 2024-04-12 RX ORDER — CEFAZOLIN SODIUM 1 G/3ML
1 INJECTION, POWDER, FOR SOLUTION INTRAMUSCULAR; INTRAVENOUS
Status: COMPLETED | OUTPATIENT
Start: 2024-04-12 | End: 2024-04-12

## 2024-04-12 RX ORDER — MORPHINE SULFATE 2 MG/ML
2 INJECTION, SOLUTION INTRAMUSCULAR; INTRAVENOUS
Status: COMPLETED | OUTPATIENT
Start: 2024-04-12 | End: 2024-04-12

## 2024-04-12 RX ORDER — KETOROLAC TROMETHAMINE 30 MG/ML
15 INJECTION, SOLUTION INTRAMUSCULAR; INTRAVENOUS
Status: COMPLETED | OUTPATIENT
Start: 2024-04-12 | End: 2024-04-12

## 2024-04-12 RX ADMIN — MORPHINE SULFATE 2 MG: 2 INJECTION, SOLUTION INTRAMUSCULAR; INTRAVENOUS at 10:04

## 2024-04-12 RX ADMIN — KETOROLAC TROMETHAMINE 15 MG: 30 INJECTION, SOLUTION INTRAMUSCULAR at 08:04

## 2024-04-12 RX ADMIN — CEFAZOLIN 1 G: 1 INJECTION, POWDER, FOR SOLUTION INTRAMUSCULAR; INTRAVENOUS at 08:04

## 2024-04-12 RX ADMIN — CLOSTRIDIUM TETANI TOXOID ANTIGEN (FORMALDEHYDE INACTIVATED) AND CORYNEBACTERIUM DIPHTHERIAE TOXOID ANTIGEN (FORMALDEHYDE INACTIVATED) 0.5 ML: 5; 2 INJECTION, SUSPENSION INTRAMUSCULAR at 08:04

## 2024-04-12 RX ADMIN — SODIUM CHLORIDE, POTASSIUM CHLORIDE, SODIUM LACTATE AND CALCIUM CHLORIDE 999 ML: 600; 310; 30; 20 INJECTION, SOLUTION INTRAVENOUS at 08:04

## 2024-04-12 RX ADMIN — IOPAMIDOL 100 ML: 755 INJECTION, SOLUTION INTRAVENOUS at 09:04
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
## 2024-04-15 ENCOUNTER — CLINICAL SUPPORT (OUTPATIENT)
Dept: REHABILITATION | Facility: HOSPITAL | Age: 54
End: 2024-04-15
Payer: MEDICAID

## 2024-04-15 DIAGNOSIS — M25.661 STIFFNESS OF RIGHT KNEE, NOT ELSEWHERE CLASSIFIED: Primary | ICD-10-CM

## 2024-04-15 DIAGNOSIS — M25.561 CHRONIC PAIN OF RIGHT KNEE: ICD-10-CM

## 2024-04-15 DIAGNOSIS — Z74.09 IMPAIRED FUNCTIONAL MOBILITY, BALANCE, GAIT, AND ENDURANCE: ICD-10-CM

## 2024-04-15 DIAGNOSIS — G89.29 CHRONIC PAIN OF RIGHT KNEE: ICD-10-CM

## 2024-05-03 DIAGNOSIS — M25.561 RIGHT KNEE PAIN: Primary | ICD-10-CM

## 2024-05-08 ENCOUNTER — HOSPITAL ENCOUNTER (OUTPATIENT)
Dept: RADIOLOGY | Facility: HOSPITAL | Age: 54
Discharge: HOME | End: 2024-05-08
Attending: NURSE PRACTITIONER
Payer: MEDICAID

## 2024-05-08 DIAGNOSIS — M25.561 RIGHT KNEE PAIN: ICD-10-CM

## 2024-05-08 PROCEDURE — 73721 MRI JNT OF LWR EXTRE W/O DYE: CPT | Mod: TC,RT

## 2024-06-04 DIAGNOSIS — M17.10 UNILATERAL PRIMARY OSTEOARTHRITIS, UNSPECIFIED KNEE: Primary | ICD-10-CM

## 2024-06-04 DIAGNOSIS — M25.661 STIFFNESS OF RIGHT KNEE, NOT ELSEWHERE CLASSIFIED: ICD-10-CM

## 2024-06-05 NOTE — PROGRESS NOTES
[facility]  Physical Therapy Initial Evaluation    Name: Shu Mcclure  Clinic Number: [id number]    Therapy Diagnosis:   Encounter Diagnoses   Name Primary?    Stiffness of right knee, not elsewhere classified Yes    Chronic pain of right knee     Impaired functional mobility, balance, gait, and endurance      Physician: Matthias Anderson,*    Physician Orders: PT Eval and Treat  Medical Diagnosis from Referral: Unilateral primary osteoarthritis of right knee  Evaluation Date: 6/6/2024  Authorization Period Expiration: ***  Plan of Care Expiration: ***  Visit # / Visits authorized: ***/ ***    Time In: ***  Time Out: ***  Total Billable Time: *** minutes    Surgery: ***  Orthopedic Precautions: ***  Pertinent History: ***    Subjective   Shu reports: ***    Imaging***    NORM: {Knee mech of inj:12008}  Factors: {KNEE FACTORS:19466}     Reason for visit: ***  Onset time and any changes***  Pain level and location: ***/10  Radiate proximal or distal: ***  Describe pain: {Desc; back pain:32744}  Numbness/tingling: ***  Agg factors: {exacerbated:50370}{Causes; Pain:43931}  Ease factors: {Pain lower relieved by:68212}  Sleeping position: ***  Worse when: {Pain worse when:04482}  Change in activity, time frame: ***  Recent illness or life change: ***  Hx of adjacent joint pain: ***  Red flags: {RED FLAGS:51382}  Functional Limitations: ***  Goals: ***  Prior therapy/intervention: ***      Medical History:   Past Medical History:   Diagnosis Date    GERD (gastroesophageal reflux disease)     Hypertension        Surgical History:   Shu Mcclure  has no past surgical history on file.    Medications:   Shu has a current medication list which includes the following prescription(s): amlodipine, baclofen, clonidine, escitalopram oxalate, hydrocodone-acetaminophen, ibuprofen, lorazepam, losartan, ondansetron, pantoprazole, tramadol, and trazodone.    Allergies:   Review of  "patient's allergies indicates:  No Known Allergies     CMS Impairment/Limitation/Restriction for FOTO Survey  Therapist reviewed FOTO scores for Shu Mcclure on 6/6/2024. FOTO documents entered into [employer] - see Media section.  FOTO filled out by: ***  Patient's Physical FS Primary Measure: *** (predicted *** by visit #***)  Risk Adjusted Statistical FOTO: ***  Limitation Score: ***%  Category: {Blank single:75908::"Other","Self Care","Body Position","Carrying","Mobility"}  JR CANDE: ***% functional    Objective          Posture/Appearance    Right - ***; knee alignment {KNEE ALIGNMENT:75469}; Foot alignment ***; spinal curvature; edema ***; wound/surgical site ***    Left - ***; knee alignment {KNEE ALIGNMENT:79538}; Foot alignment ***; spinal curvature; edema ***; wound/surgical site ***     Palpation    Right - tender to *** palpatory pressure ***    Left - tender to *** palpatory pressure ***   Gait    ***     ROM    Knee  Right - ***    Left - ***     Strength    Right - knee flx, knee ext, hip ER, hip IR  Left - knee flx, knee ext, hip ER, hip IR     Dermatomes    Right - ***    Left - ***     Reflexes    Clonus: ***    Right - patellar *** ; Achilles ***  Left - patellar *** ; Achilles ***     Special Tests    Ant. Drawer: (***) right, (***) left  Post. Drawer: (***) right, (***) left  Lachman's: (***) right, (***) left  Valgus at 0 Deg: (***) right, (***) left  Valgus at 30 Deg: (***) right, (***) left  Varus at 0 Deg: (***) right, (***) left  Varus at 30 Deg: (***) right, (***) left  Sadaf's: (***) right, (***) left  Apley's Compression: (***) right, (***) left  Apley's Distraction: (***) right, (***) left  Tibial Interal Rot: (***) right, (***) left  Tibial External Rot: (***) right, (***) left  Proximal Tib/Fib: (***) right, (***) left  Dejan: (***) right, (***) left  Houghston's: (***) right, (***) left  Leslie's Sign: (***) right, (***) left  Ramirez's Sign (patellar grind): (***) right, (***) " "left  Patellar Tracking: (***) right, (***) left       Hip/Lumbar Exam    Lumbar ROM  {LUMBAR ROM:29763}    MITCHELL: (***) left, (***) right  FADDIR: (***) left, (***) right  Logroll: (***) left, (***) right  90/90 hamstring test: (***) left, (***) right  Piriformis test: (***) left, (***) right  Elver test: (***) left, (***) right  Hip IR ROM: *** deg left, *** deg right  Hip ER ROM: *** deg left, ***deg right  Charito's test: (***) left, (***) right  Labral Test: (***) left, (***) right     Functional Testing    Single Leg Squat  --Right knee - {KNEE ALIGNMENT:92845}  --Left knee - {KNEE ALIGNMENT:01408}      Double Leg Squat  --Right knee - {KNEE ALIGNMENT:31299}  --Left knee - {KNEE ALIGNMENT:39752}                        TREATMENT     Shu received the treatments listed below:       Time Activities   Manual     TherAct     TherEx     Gait     Neuro Re-ed     Modalities     E-Stim     Dry Needling     Canalith Repositioning       Home Exercises and Patient Education Provided    Education provided:   -Plan of care, HEP, ***    Written Home Exercises Provided: {Blank single:48682::"yes","Patient instructed to cont prior HEP"}.  Exercises were reviewed and Shu was able to demonstrate them prior to the end of the session.  Shu demonstrated {Desc; good/fair/poor:14050} understanding of the education provided.     Copy of exercises provided placed in paper chart.    Assessment   Shu is a 53 y.o. female referred to outpatient Physical Therapy with a medical diagnosis of unilateral primary osteoarthritis of right knee. Patient presents with ***. Patient will benefit from skilled outpatient Physical Therapy to address the deficits stated above and in the chart below, provide education, and to maximize patient's level of independence.    Patient prognosis is {REHAB PROGNOSIS OHS:58950}.     Plan of care discussed with patient: Yes  Patient's spiritual, cultural and educational needs considered and patient " is agreeable to the plan of care and goals as stated below:    Anticipated Barriers for therapy: ***    Goals:  Short Term Goals: 6 weeks   Patient will report at least 10% increase in functional capacity from initial KOOS, JR score to indicate clinically significant functional improvement  Patient will report at least 10 point increase on initial FOTO score to indicate clinically significant functional improvement  Patient will ***    Long Term Goals: 12 weeks   Patient will report at least 20% increase in functional capacity from initial KOOS, JR score to indicate clinically significant functional improvement  Patient will report at least 20 point increase on initial FOTO score to indicate clinically significant functional improvement  Patient will ***    Plan   Plan of care Certification: 6/6/2024 to 9/6/2024.    Outpatient Physical Therapy 2-3 times weekly for 12 weeks to include the following interventions: Gait Training, Manual Therapy, Moist Heat/ Ice, Neuromuscular Re-ed, Patient Education, Self Care, Therapeutic Activities, and Therapeutic Exercise.     Gem Givens, PT

## 2024-06-06 ENCOUNTER — CLINICAL SUPPORT (OUTPATIENT)
Dept: REHABILITATION | Facility: HOSPITAL | Age: 54
End: 2024-06-06
Payer: MEDICAID

## 2024-06-06 DIAGNOSIS — M25.561 CHRONIC PAIN OF RIGHT KNEE: ICD-10-CM

## 2024-06-06 DIAGNOSIS — G89.29 CHRONIC PAIN OF RIGHT KNEE: ICD-10-CM

## 2024-06-06 DIAGNOSIS — M25.661 STIFFNESS OF RIGHT KNEE, NOT ELSEWHERE CLASSIFIED: Primary | ICD-10-CM

## 2024-06-06 DIAGNOSIS — Z74.09 IMPAIRED FUNCTIONAL MOBILITY, BALANCE, GAIT, AND ENDURANCE: ICD-10-CM

## 2024-06-06 PROCEDURE — 97163 PT EVAL HIGH COMPLEX 45 MIN: CPT

## 2024-06-06 NOTE — PLAN OF CARE
[facility]  Physical Therapy Initial Evaluation    Name: Shu Mcclure  Clinic Number: [id number]    Therapy Diagnosis:   Encounter Diagnoses   Name Primary?    Stiffness of right knee, not elsewhere classified Yes    Chronic pain of right knee     Impaired functional mobility, balance, gait, and endurance      Physician: Matthias Anderson,*    Physician Orders: PT Eval and Treat  Medical Diagnosis from Referral: Unilateral primary osteoarthritis of right knee, low back pain  Evaluation Date: 6/6/2024  Authorization Period Expiration: TBD  Plan of Care Expiration: 9/6/2024  Visit # / Visits authorized: 0/ TBD    Time In: 1345  Time Out: 1425  Total Billable Time: 40 minutes    Surgery: None  Orthopedic Precautions: None  Pertinent History: History of bilateral hip pain    Subjective   Shu reports: She comes in with severe right knee pain, chronic long-standing, and severe low back pain. At current she is disabled. She has been treated for this right knee but no real relief. She had a scope done in which MD scraped away tissue but the following day severe swelling followed by surgery for fluid removal. Since then recurrent swelling in right knee. She wears a hinged knee brace that locks at 0 deg extension. She walks with a straight cane, states she has a rollator but does not use because it is too heavy to maneuver in community. Long history of bilateral hip pain, which she did PT, but notes both hips are significantly weakened. Her chief complaint is severe pain in the right knee. Multiple MD opinions given, states she was told she needs a TKA by one, and another is trying different measures before TKA. Regarding low back, she can no longer stand upright without excruciating pain. States she has a scoliotic curve in her low back. She has a custom-fit LSO, but it makes her back too straight which results in severe pain; because of this she only wears the LSO at  night.    Imaging  MRI KNEE WITHOUT CONTRAST RIGHT - 5/8/2024     CLINICAL HISTORY:  m25.561;Pain in right knee     TECHNIQUE:  Multiplanar, multisequence images were performed about the right knee.  No contrast was administered.     COMPARISON:  Radiographs of the right knee used for comparison dated 03/06/2024     FINDINGS:  The cruciate ligaments are intact.  Imminent are intact.  Popliteus tendon is intact.     A complex tear is present to the body and posterior horn of the medial meniscus.  The lateral meniscus is intact.  A 12 mm osteochondral loose body is present in the anterolateral recess of the lateral compartment.  Grade 4 chondromalacia isn't to the medial and lateral compartments and to the patellofemoral joint.     The extensor mechanism is intact.  A small knee joint effusion is present.  The included muscles are intact.  Iliotibial band is unremarkable.  Tricompartmental hypertrophic change     Impression:     A complex tear is present to the body and posterior horn of the medial meniscus.     A 12 mm osteochondral loose body is present to the anterior recess of the lateral compartment.     Grade 4 chondromalacia and hypertrophic change are present throughout the knee.      Medical History:   Past Medical History:   Diagnosis Date    GERD (gastroesophageal reflux disease)     Hypertension        Surgical History:   Shu Mcclure  has no past surgical history on file.    Medications:   Shu has a current medication list which includes the following prescription(s): amlodipine, baclofen, clonidine, escitalopram oxalate, hydrocodone-acetaminophen, ibuprofen, lorazepam, losartan, ondansetron, pantoprazole, tramadol, and trazodone.    Allergies:   Review of patient's allergies indicates:  No Known Allergies     CMS Impairment/Limitation/Restriction for FOTO Survey  Therapist reviewed FOTO scores for Shu Mcclure on 6/6/2024. FOTO documents entered into [employer] - see Media section.  FOTO filled  out by: Patient  Patient's Physical FS Primary Measure: 9 (predicted 37 by visit #16)  Risk Adjusted Statistical FOTO: 39  Limitation Score: 91%  Category: Mobility  KOOS, JR: 15.9% functional    Objective          Posture/Appearance    Ambulating with straight cane and forward flexed trunk     Palpation    Right - tender to mild palpatory pressure distal medial femur (condyle), medial calf, medial distal hamstrings     Severe tenderness to light touch low back paraspinals and spinous processes   Gait    Use of straight cane, antalgic pattern RLE     ROM    Knee  Right - 10 deg extension lag, flexion WFL     Left - WFL     Strength    Right - knee flx 3, knee ext 2+, hip ER 3, hip IR 4, ankle DF 4  Left - knee flx 3, knee ext 4, hip ER 3, hip IR 4, ankle DF 4                    TREATMENT     Shu received the treatments listed below:       Time Activities   Manual     TherAct     TherEx  Mechanical Lumbar Traction  -38# high, 28# low  -25 sec on, 10 sec off  -5 step ramp up, 6 step ramp down     Gait     Neuro Re-ed     Modalities     E-Stim     Dry Needling     Canalith Repositioning       Home Exercises and Patient Education Provided    Education provided:   -Plan of care, HEP      Assessment   Shu is a 53 y.o. female referred to outpatient Physical Therapy with a medical diagnosis of unilateral primary osteoarthritis of right knee, low back pain. Patient presents with severe debility. Suspect that she will need right knee surgery likely TKA, especially given failed attempt at rehab for right knee just recently and the extent of damage seen via imaging. Low back pain very consistent with stenosis, and suspect that degree is severe enough that she will eventually require advanced medical management. Patient may benefit from skilled outpatient Physical Therapy to address the deficits stated above and in the chart below, provide education, and to maximize patient's level of independence.    Patient prognosis  is Guarded.     Plan of care discussed with patient: Yes  Patient's spiritual, cultural and educational needs considered and patient is agreeable to the plan of care and goals as stated below:    Anticipated Barriers for therapy: None    Goals:  Short Term Goals: 6 weeks   Patient will report at least 10% increase in functional capacity from initial KOOS, JR score to indicate clinically significant functional improvement  Patient will report at least 10 point increase on initial FOTO score to indicate clinically significant functional improvement    Long Term Goals: 12 weeks   Patient will report at least 20% increase in functional capacity from initial KOOS, JR score to indicate clinically significant functional improvement  Patient will report at least 20 point increase on initial FOTO score to indicate clinically significant functional improvement  Patient will be able to assume and maintain a neutral trunk standing position with no flexed trunk    Plan   Plan of care Certification: 6/6/2024 to 9/6/2024.    Outpatient Physical Therapy 2-3 times weekly for 12 weeks to include the following interventions: Gait Training, Manual Therapy, Moist Heat/ Ice, Neuromuscular Re-ed, Patient Education, Self Care, Therapeutic Activities, and Therapeutic Exercise.     Gem Givens, PT

## 2024-08-04 ENCOUNTER — HOSPITAL ENCOUNTER (EMERGENCY)
Facility: HOSPITAL | Age: 54
Discharge: HOME | End: 2024-08-04
Attending: FAMILY MEDICINE
Payer: MEDICAID

## 2024-08-04 VITALS
WEIGHT: 126 LBS | SYSTOLIC BLOOD PRESSURE: 128 MMHG | TEMPERATURE: 97 F | BODY MASS INDEX: 20.25 KG/M2 | HEART RATE: 65 BPM | DIASTOLIC BLOOD PRESSURE: 82 MMHG | RESPIRATION RATE: 18 BRPM | OXYGEN SATURATION: 97 % | HEIGHT: 66 IN

## 2024-08-04 DIAGNOSIS — R10.9 ABDOMINAL CRAMPING: Primary | ICD-10-CM

## 2024-08-04 DIAGNOSIS — R10.10 UPPER ABDOMINAL PAIN: ICD-10-CM

## 2024-08-04 LAB
ALBUMIN SERPL-MCNC: 4.2 G/DL (ref 3.5–5)
ALBUMIN/GLOB SERPL: 1.2 RATIO (ref 1.1–2)
ALP SERPL-CCNC: 90 UNIT/L (ref 40–150)
ALT SERPL-CCNC: 24 UNIT/L (ref 0–55)
AMYLASE SERPL-CCNC: 72 UNIT/L (ref 25–125)
ANION GAP SERPL CALC-SCNC: 14 MEQ/L
AST SERPL-CCNC: 25 UNIT/L (ref 5–34)
BACTERIA #/AREA URNS AUTO: ABNORMAL /HPF
BASOPHILS # BLD AUTO: 0.04 X10(3)/MCL
BASOPHILS NFR BLD AUTO: 0.7 %
BILIRUB SERPL-MCNC: 0.6 MG/DL
BILIRUB UR QL STRIP.AUTO: NEGATIVE
BUN SERPL-MCNC: 8 MG/DL (ref 9.8–20.1)
CALCIUM SERPL-MCNC: 9.8 MG/DL (ref 8.4–10.2)
CHLORIDE SERPL-SCNC: 108 MMOL/L (ref 98–107)
CLARITY UR: CLEAR
CO2 SERPL-SCNC: 22 MMOL/L (ref 22–29)
COLOR UR AUTO: YELLOW
CREAT SERPL-MCNC: 0.69 MG/DL (ref 0.55–1.02)
CREAT/UREA NIT SERPL: 12
EOSINOPHIL # BLD AUTO: 0.11 X10(3)/MCL (ref 0–0.9)
EOSINOPHIL NFR BLD AUTO: 2 %
ERYTHROCYTE [DISTWIDTH] IN BLOOD BY AUTOMATED COUNT: 12.5 % (ref 11.5–17)
GFR SERPLBLD CREATININE-BSD FMLA CKD-EPI: >60 ML/MIN/1.73/M2
GLOBULIN SER-MCNC: 3.5 GM/DL (ref 2.4–3.5)
GLUCOSE SERPL-MCNC: 97 MG/DL (ref 74–100)
GLUCOSE UR QL STRIP: NEGATIVE
HCT VFR BLD AUTO: 39.6 % (ref 37–47)
HGB BLD-MCNC: 13.8 G/DL (ref 12–16)
HGB UR QL STRIP: ABNORMAL
IMM GRANULOCYTES # BLD AUTO: 0.01 X10(3)/MCL (ref 0–0.04)
IMM GRANULOCYTES NFR BLD AUTO: 0.2 %
KETONES UR QL STRIP: NEGATIVE
LEUKOCYTE ESTERASE UR QL STRIP: NEGATIVE
LIPASE SERPL-CCNC: 39 U/L
LYMPHOCYTES # BLD AUTO: 2.81 X10(3)/MCL (ref 0.6–4.6)
LYMPHOCYTES NFR BLD AUTO: 52.3 %
MCH RBC QN AUTO: 33.2 PG (ref 27–31)
MCHC RBC AUTO-ENTMCNC: 34.8 G/DL (ref 33–36)
MCV RBC AUTO: 95.2 FL (ref 80–94)
MONOCYTES # BLD AUTO: 0.64 X10(3)/MCL (ref 0.1–1.3)
MONOCYTES NFR BLD AUTO: 11.9 %
NEUTROPHILS # BLD AUTO: 1.76 X10(3)/MCL (ref 2.1–9.2)
NEUTROPHILS NFR BLD AUTO: 32.9 %
NITRITE UR QL STRIP: NEGATIVE
NRBC BLD AUTO-RTO: 0 %
PH UR STRIP: 6 [PH]
PLATELET # BLD AUTO: 336 X10(3)/MCL (ref 130–400)
PMV BLD AUTO: 8.2 FL (ref 7.4–10.4)
POTASSIUM SERPL-SCNC: 3.8 MMOL/L (ref 3.5–5.1)
PROT SERPL-MCNC: 7.7 GM/DL (ref 6.4–8.3)
PROT UR QL STRIP: NEGATIVE
RBC # BLD AUTO: 4.16 X10(6)/MCL (ref 4.2–5.4)
RBC #/AREA URNS AUTO: ABNORMAL /HPF
SODIUM SERPL-SCNC: 144 MMOL/L (ref 136–145)
SP GR UR STRIP.AUTO: 1.01 (ref 1–1.03)
SQUAMOUS #/AREA URNS AUTO: ABNORMAL /HPF
UROBILINOGEN UR STRIP-ACNC: 0.2
WBC # BLD AUTO: 5.37 X10(3)/MCL (ref 4.5–11.5)
WBC #/AREA URNS AUTO: ABNORMAL /HPF

## 2024-08-04 PROCEDURE — 25000003 PHARM REV CODE 250: Performed by: FAMILY MEDICINE

## 2024-08-04 PROCEDURE — 96375 TX/PRO/DX INJ NEW DRUG ADDON: CPT

## 2024-08-04 PROCEDURE — 83690 ASSAY OF LIPASE: CPT | Performed by: FAMILY MEDICINE

## 2024-08-04 PROCEDURE — 96376 TX/PRO/DX INJ SAME DRUG ADON: CPT

## 2024-08-04 PROCEDURE — 82150 ASSAY OF AMYLASE: CPT | Performed by: FAMILY MEDICINE

## 2024-08-04 PROCEDURE — 96372 THER/PROPH/DIAG INJ SC/IM: CPT | Performed by: STUDENT IN AN ORGANIZED HEALTH CARE EDUCATION/TRAINING PROGRAM

## 2024-08-04 PROCEDURE — 93005 ELECTROCARDIOGRAM TRACING: CPT

## 2024-08-04 PROCEDURE — 25500020 PHARM REV CODE 255: Performed by: FAMILY MEDICINE

## 2024-08-04 PROCEDURE — 99285 EMERGENCY DEPT VISIT HI MDM: CPT | Mod: 25

## 2024-08-04 PROCEDURE — 80053 COMPREHEN METABOLIC PANEL: CPT | Performed by: FAMILY MEDICINE

## 2024-08-04 PROCEDURE — 96361 HYDRATE IV INFUSION ADD-ON: CPT

## 2024-08-04 PROCEDURE — 99900031 HC PATIENT EDUCATION (STAT)

## 2024-08-04 PROCEDURE — 81003 URINALYSIS AUTO W/O SCOPE: CPT | Performed by: STUDENT IN AN ORGANIZED HEALTH CARE EDUCATION/TRAINING PROGRAM

## 2024-08-04 PROCEDURE — 63600175 PHARM REV CODE 636 W HCPCS: Performed by: STUDENT IN AN ORGANIZED HEALTH CARE EDUCATION/TRAINING PROGRAM

## 2024-08-04 PROCEDURE — 85025 COMPLETE CBC W/AUTO DIFF WBC: CPT | Performed by: FAMILY MEDICINE

## 2024-08-04 PROCEDURE — 96374 THER/PROPH/DIAG INJ IV PUSH: CPT | Mod: 59

## 2024-08-04 PROCEDURE — 81001 URINALYSIS AUTO W/SCOPE: CPT | Performed by: STUDENT IN AN ORGANIZED HEALTH CARE EDUCATION/TRAINING PROGRAM

## 2024-08-04 PROCEDURE — 63600175 PHARM REV CODE 636 W HCPCS: Performed by: FAMILY MEDICINE

## 2024-08-04 RX ORDER — DICYCLOMINE HYDROCHLORIDE 10 MG/ML
20 INJECTION INTRAMUSCULAR
Status: COMPLETED | OUTPATIENT
Start: 2024-08-04 | End: 2024-08-04

## 2024-08-04 RX ORDER — MORPHINE SULFATE 2 MG/ML
4 INJECTION, SOLUTION INTRAMUSCULAR; INTRAVENOUS
Status: COMPLETED | OUTPATIENT
Start: 2024-08-04 | End: 2024-08-04

## 2024-08-04 RX ORDER — ONDANSETRON HYDROCHLORIDE 2 MG/ML
4 INJECTION, SOLUTION INTRAVENOUS
Status: COMPLETED | OUTPATIENT
Start: 2024-08-04 | End: 2024-08-04

## 2024-08-04 RX ORDER — DICYCLOMINE HYDROCHLORIDE 20 MG/1
20 TABLET ORAL 2 TIMES DAILY PRN
Qty: 15 TABLET | Refills: 0 | Status: SHIPPED | OUTPATIENT
Start: 2024-08-04 | End: 2024-09-03

## 2024-08-04 RX ORDER — IOPAMIDOL 755 MG/ML
100 INJECTION, SOLUTION INTRAVASCULAR
Status: COMPLETED | OUTPATIENT
Start: 2024-08-04 | End: 2024-08-04

## 2024-08-04 RX ORDER — MORPHINE SULFATE 2 MG/ML
2 INJECTION, SOLUTION INTRAMUSCULAR; INTRAVENOUS
Status: COMPLETED | OUTPATIENT
Start: 2024-08-04 | End: 2024-08-04

## 2024-08-04 RX ADMIN — MORPHINE SULFATE 2 MG: 2 INJECTION, SOLUTION INTRAMUSCULAR; INTRAVENOUS at 07:08

## 2024-08-04 RX ADMIN — MORPHINE SULFATE 4 MG: 2 INJECTION, SOLUTION INTRAMUSCULAR; INTRAVENOUS at 08:08

## 2024-08-04 RX ADMIN — ONDANSETRON 4 MG: 2 INJECTION INTRAMUSCULAR; INTRAVENOUS at 07:08

## 2024-08-04 RX ADMIN — SODIUM CHLORIDE 1000 ML: 9 INJECTION, SOLUTION INTRAVENOUS at 06:08

## 2024-08-04 RX ADMIN — IOPAMIDOL 100 ML: 755 INJECTION, SOLUTION INTRAVENOUS at 08:08

## 2024-08-04 RX ADMIN — DICYCLOMINE HYDROCHLORIDE 20 MG: 10 INJECTION INTRAMUSCULAR at 08:08

## 2024-08-05 LAB
OHS QRS DURATION: 94 MS
OHS QTC CALCULATION: 418 MS

## 2024-10-24 DIAGNOSIS — M54.16 RADICULOPATHY, LUMBAR REGION: Primary | ICD-10-CM

## 2024-10-25 ENCOUNTER — CLINICAL SUPPORT (OUTPATIENT)
Dept: REHABILITATION | Facility: HOSPITAL | Age: 54
End: 2024-10-25
Payer: MEDICAID

## 2024-10-25 DIAGNOSIS — M54.16 RADICULOPATHY, LUMBAR REGION: Primary | ICD-10-CM

## 2024-10-25 PROCEDURE — 97161 PT EVAL LOW COMPLEX 20 MIN: CPT

## 2024-10-30 ENCOUNTER — CLINICAL SUPPORT (OUTPATIENT)
Dept: REHABILITATION | Facility: HOSPITAL | Age: 54
End: 2024-10-30
Payer: MEDICAID

## 2024-10-30 DIAGNOSIS — M54.16 RADICULOPATHY, LUMBAR REGION: Primary | ICD-10-CM

## 2024-10-30 PROCEDURE — 97530 THERAPEUTIC ACTIVITIES: CPT

## 2024-10-30 PROCEDURE — 97010 HOT OR COLD PACKS THERAPY: CPT

## 2024-10-30 PROCEDURE — 97110 THERAPEUTIC EXERCISES: CPT

## 2024-11-01 ENCOUNTER — CLINICAL SUPPORT (OUTPATIENT)
Dept: REHABILITATION | Facility: HOSPITAL | Age: 54
End: 2024-11-01
Payer: MEDICAID

## 2024-11-01 DIAGNOSIS — M54.16 RADICULOPATHY, LUMBAR REGION: Primary | ICD-10-CM

## 2024-11-01 PROCEDURE — 97110 THERAPEUTIC EXERCISES: CPT

## 2024-11-01 PROCEDURE — 97530 THERAPEUTIC ACTIVITIES: CPT

## 2024-11-04 ENCOUNTER — CLINICAL SUPPORT (OUTPATIENT)
Dept: REHABILITATION | Facility: HOSPITAL | Age: 54
End: 2024-11-04
Payer: MEDICAID

## 2024-11-04 DIAGNOSIS — M54.16 RADICULOPATHY, LUMBAR REGION: Primary | ICD-10-CM

## 2024-11-04 PROCEDURE — 97530 THERAPEUTIC ACTIVITIES: CPT

## 2024-11-04 PROCEDURE — 97110 THERAPEUTIC EXERCISES: CPT

## 2024-11-04 NOTE — PROGRESS NOTES
Physical Therapy Treatment Note     Name: Shu Mcclure  Clinic Number: [id number]    Therapy Diagnosis:   Encounter Diagnosis   Name Primary?    Radiculopathy, lumbar region Yes     Physician: Kd Ocampo MD    Visit Date: 11/4/2024    Physician Orders: PT Eval and Treat  Medical Diagnosis from Referral: lumbar radiculopathy   Evaluation Date: 10/25/2024  Authorization Period Expiration: med nec   Plan of Care Expiration: 1/17/25  Visit # / Visits authorized: 4 / med nec    Time In: 947  Time Out: 1042  Total Billable Time: 55 minutes    Subjective     Patient reports: she is feeling a little better today.     CMS Impairment/Limitation/Restriction for FOTO Survey  Therapist reviewed FOTO scores for Shu Mcclure on 10/25/2024. FOTO documents entered into [employer] - see Media section.  FOTO filled out by: patient   Patient's Physical FS Primary Measure: 13 (predicted 34 by visit #13)  Risk Adjusted Statistical FOTO: 42  Category: Mobility  MDQ: 78% disability    Objective     Shu received the following treatment:     Time Activities   Manual     TherAct 25 min Functional tasks    TherEx 30 min ROM and strengthening ex   Gait     Neuro Re-ed     Modalities  CP on LB at end of session    E-Stim     Dry Needling     Canalith Repositioning           Home Exercises Provided and Patient Education Provided     Education provided:   -Plan of care, HEP    Assessment     Continued POC this date. Progressed repetitions for each exercise again this date without complaints. Advised pt to ambulate with SPC on contralateral side to assist with her hurting R Knee. Pt with improved gait and reports of pain following the adjustment.     Patient prognosis is Good.      Anticipated barriers to physical therapy: previous history of non-compliance with PT     Goals: Shu Is progressing well towards her goals.    Short Term Goals: 6 weeks   Patient will report at least 10% disability reduction from initial MDQ score to  indicate clinically significant functional improvement  Patient will report at least 10 point increase on initial FOTO score to indicate clinically significant functional improvement  Patient will 50% reduction in pain.  Pt will stand and ambulate with minimal antalgic compensation.     Long Term Goals: 12 weeks   Patient will report at least 20% disability reduction from initial MDQ score to indicate clinically significant functional improvement  Patient will report at least 20 point increase on initial FOTO score to indicate clinically significant functional improvement  Patient will demonstrate normal lumbar spine and hip AROM.  Pt will demonstrate at least 4+/5 BLE MMT.  Pt will report 0-1/10 pain.     Plan     3 x/week x 12 weeks    Dilan Xie, PT

## 2024-11-06 ENCOUNTER — CLINICAL SUPPORT (OUTPATIENT)
Dept: REHABILITATION | Facility: HOSPITAL | Age: 54
End: 2024-11-06
Payer: MEDICAID

## 2024-11-06 DIAGNOSIS — M54.16 RADICULOPATHY, LUMBAR REGION: Primary | ICD-10-CM

## 2024-11-06 PROCEDURE — 97530 THERAPEUTIC ACTIVITIES: CPT

## 2024-11-06 PROCEDURE — 97110 THERAPEUTIC EXERCISES: CPT

## 2024-11-06 NOTE — PROGRESS NOTES
Physical Therapy Treatment Note     Name: Shu Mcclure  Clinic Number: [id number]    Therapy Diagnosis:   Encounter Diagnosis   Name Primary?    Radiculopathy, lumbar region Yes     Physician: Kd Ocampo MD    Visit Date: 11/6/2024    Physician Orders: PT Eval and Treat  Medical Diagnosis from Referral: lumbar radiculopathy   Evaluation Date: 10/25/2024  Authorization Period Expiration: med nec   Plan of Care Expiration: 1/17/25  Visit # / Visits authorized: 5 / med nec    Time In: 947  Time Out: 1047  Total Billable Time: 60 minutes    Subjective     Patient reports: experiencing some soreness this date.     CMS Impairment/Limitation/Restriction for FOTO Survey  Therapist reviewed FOTO scores for Shu Mcclure on 10/25/2024. FOTO documents entered into [employer] - see Media section.  FOTO filled out by: patient   Patient's Physical FS Primary Measure: 13 (predicted 34 by visit #13)  Risk Adjusted Statistical FOTO: 42  Category: Mobility  MDQ: 78% disability    Objective     Shu received the following treatment:     Time Activities   Manual     TherAct 30 min Functional tasks    TherEx 30 min ROM and strengthening ex   Gait     Neuro Re-ed     Modalities  CP on LB at end of session    E-Stim     Dry Needling     Canalith Repositioning           Home Exercises Provided and Patient Education Provided     Education provided:   -Plan of care, HEP    Assessment     Continued POC this date. Mild progressions for each exercise again this date without complaints.     Patient prognosis is Good.      Anticipated barriers to physical therapy: previous history of non-compliance with PT     Goals: Shu Is progressing well towards her goals.    Short Term Goals: 6 weeks   Patient will report at least 10% disability reduction from initial MDQ score to indicate clinically significant functional improvement  Patient will report at least 10 point increase on initial FOTO score to indicate clinically significant  functional improvement  Patient will 50% reduction in pain.  Pt will stand and ambulate with minimal antalgic compensation.     Long Term Goals: 12 weeks   Patient will report at least 20% disability reduction from initial MDQ score to indicate clinically significant functional improvement  Patient will report at least 20 point increase on initial FOTO score to indicate clinically significant functional improvement  Patient will demonstrate normal lumbar spine and hip AROM.  Pt will demonstrate at least 4+/5 BLE MMT.  Pt will report 0-1/10 pain.     Plan     3 x/week x 12 weeks    Dilan Xie, PT

## 2024-11-08 ENCOUNTER — CLINICAL SUPPORT (OUTPATIENT)
Dept: REHABILITATION | Facility: HOSPITAL | Age: 54
End: 2024-11-08
Payer: MEDICAID

## 2024-11-08 DIAGNOSIS — M54.16 RADICULOPATHY, LUMBAR REGION: Primary | ICD-10-CM

## 2024-11-08 PROCEDURE — 97110 THERAPEUTIC EXERCISES: CPT

## 2024-11-08 PROCEDURE — 97530 THERAPEUTIC ACTIVITIES: CPT

## 2024-11-08 NOTE — PROGRESS NOTES
"  Physical Therapy Treatment Note     Name: Shu Mcclure  Clinic Number: [id number]    Therapy Diagnosis:   Encounter Diagnosis   Name Primary?    Radiculopathy, lumbar region Yes     Physician: Kd Ocampo MD    Visit Date: 11/8/2024    Physician Orders: PT Eval and Treat  Medical Diagnosis from Referral: lumbar radiculopathy   Evaluation Date: 10/25/2024  Authorization Period Expiration: med [facility]   Plan of Care Expiration: 1/17/25  Visit # / Visits authorized: 5 / 36    Time In: 0948  Time Out: 1041  Total Billable Time: 53 minutes    Subjective     Patient reports: she is continuing to have horrible back pain. Has to do therapy "but really needs an MRI" per patient report. She reports she is hurting all over.       CMS Impairment/Limitation/Restriction for FOTO Survey  Therapist reviewed FOTO scores for Shu Mcclure on 10/25/2024. FOTO documents entered into [employer] - see Media section.  FOTO filled out by: patient   Patient's Physical FS Primary Measure: 13 (predicted 34 by visit #13)  Risk Adjusted Statistical FOTO: 42  Category: Mobility  MDQ: 78% disability    Objective     Shu received the following treatment:     Time Activities   Manual     TherAct 23 min Functional tasks    TherEx 30 min ROM and strengthening ex   Gait     Neuro Re-ed     Modalities  CP on LB at end of session    E-Stim     Dry Needling     Canalith Repositioning       Home Exercises Provided and Patient Education Provided     Education provided:   -Plan of care, HEP    Assessment     Continued POC this date. Remained with consistent program for exercises today. Patient needing VC to remain on task throughout. Was able to perform exercises without back brace.     Patient prognosis is Good.      Anticipated barriers to physical therapy: previous history of non-compliance with PT     Goals: Shu Is progressing well towards her goals.    Short Term Goals: 6 weeks   Patient will report at least 10% disability reduction " from initial MDQ score to indicate clinically significant functional improvement  Patient will report at least 10 point increase on initial FOTO score to indicate clinically significant functional improvement  Patient will 50% reduction in pain.  Pt will stand and ambulate with minimal antalgic compensation.     Long Term Goals: 12 weeks   Patient will report at least 20% disability reduction from initial MDQ score to indicate clinically significant functional improvement  Patient will report at least 20 point increase on initial FOTO score to indicate clinically significant functional improvement  Patient will demonstrate normal lumbar spine and hip AROM.  Pt will demonstrate at least 4+/5 BLE MMT.  Pt will report 0-1/10 pain.     Plan     3 x/week x 12 weeks    Abhi Holley, PT

## 2024-11-09 ENCOUNTER — HOSPITAL ENCOUNTER (EMERGENCY)
Facility: HOSPITAL | Age: 54
Discharge: HOME | End: 2024-11-09
Attending: EMERGENCY MEDICINE
Payer: MEDICAID

## 2024-11-09 VITALS
OXYGEN SATURATION: 99 % | RESPIRATION RATE: 20 BRPM | DIASTOLIC BLOOD PRESSURE: 120 MMHG | TEMPERATURE: 97 F | HEART RATE: 67 BPM | BODY MASS INDEX: 20.73 KG/M2 | HEIGHT: 66 IN | WEIGHT: 129 LBS | SYSTOLIC BLOOD PRESSURE: 160 MMHG

## 2024-11-09 DIAGNOSIS — R03.0 ELEVATED BLOOD PRESSURE READING: ICD-10-CM

## 2024-11-09 DIAGNOSIS — R11.0 NAUSEA: ICD-10-CM

## 2024-11-09 DIAGNOSIS — R10.84 GENERALIZED ABDOMINAL PAIN: Primary | ICD-10-CM

## 2024-11-09 LAB
ALBUMIN SERPL-MCNC: 3.8 G/DL (ref 3.5–5)
ALBUMIN/GLOB SERPL: 1.2 RATIO (ref 1.1–2)
ALP SERPL-CCNC: 91 UNIT/L (ref 40–150)
ALT SERPL-CCNC: 21 UNIT/L (ref 0–55)
ANION GAP SERPL CALC-SCNC: 9 MEQ/L
AST SERPL-CCNC: 25 UNIT/L (ref 5–34)
BACTERIA #/AREA URNS AUTO: ABNORMAL /HPF
BASOPHILS # BLD AUTO: 0.03 X10(3)/MCL
BASOPHILS NFR BLD AUTO: 0.7 %
BILIRUB SERPL-MCNC: 0.5 MG/DL
BILIRUB UR QL STRIP.AUTO: NEGATIVE
BUN SERPL-MCNC: 10 MG/DL (ref 9.8–20.1)
CALCIUM SERPL-MCNC: 9.4 MG/DL (ref 8.4–10.2)
CHLORIDE SERPL-SCNC: 108 MMOL/L (ref 98–107)
CLARITY UR: CLEAR
CO2 SERPL-SCNC: 26 MMOL/L (ref 22–29)
COLOR UR AUTO: YELLOW
CREAT SERPL-MCNC: 0.72 MG/DL (ref 0.55–1.02)
CREAT/UREA NIT SERPL: 14
EOSINOPHIL # BLD AUTO: 0.12 X10(3)/MCL (ref 0–0.9)
EOSINOPHIL NFR BLD AUTO: 2.8 %
ERYTHROCYTE [DISTWIDTH] IN BLOOD BY AUTOMATED COUNT: 11.8 % (ref 11.5–17)
GFR SERPLBLD CREATININE-BSD FMLA CKD-EPI: >60 ML/MIN/1.73/M2
GLOBULIN SER-MCNC: 3.3 GM/DL (ref 2.4–3.5)
GLUCOSE SERPL-MCNC: 97 MG/DL (ref 74–100)
GLUCOSE UR QL STRIP: NEGATIVE
HCT VFR BLD AUTO: 39.1 % (ref 37–47)
HGB BLD-MCNC: 13.6 G/DL (ref 12–16)
HGB UR QL STRIP: ABNORMAL
IMM GRANULOCYTES # BLD AUTO: 0 X10(3)/MCL (ref 0–0.04)
IMM GRANULOCYTES NFR BLD AUTO: 0 %
KETONES UR QL STRIP: NEGATIVE
LEUKOCYTE ESTERASE UR QL STRIP: NEGATIVE
LIPASE SERPL-CCNC: 49 U/L
LYMPHOCYTES # BLD AUTO: 1.84 X10(3)/MCL (ref 0.6–4.6)
LYMPHOCYTES NFR BLD AUTO: 43.5 %
MCH RBC QN AUTO: 32.3 PG (ref 27–31)
MCHC RBC AUTO-ENTMCNC: 34.8 G/DL (ref 33–36)
MCV RBC AUTO: 92.9 FL (ref 80–94)
MONOCYTES # BLD AUTO: 0.49 X10(3)/MCL (ref 0.1–1.3)
MONOCYTES NFR BLD AUTO: 11.6 %
MUCOUS THREADS URNS QL MICRO: ABNORMAL /LPF
NEUTROPHILS # BLD AUTO: 1.75 X10(3)/MCL (ref 2.1–9.2)
NEUTROPHILS NFR BLD AUTO: 41.4 %
NITRITE UR QL STRIP: NEGATIVE
NRBC BLD AUTO-RTO: 0 %
PH UR STRIP: 6 [PH]
PLATELET # BLD AUTO: 303 X10(3)/MCL (ref 130–400)
PMV BLD AUTO: 8.2 FL (ref 7.4–10.4)
POTASSIUM SERPL-SCNC: 3.8 MMOL/L (ref 3.5–5.1)
PROT SERPL-MCNC: 7.1 GM/DL (ref 6.4–8.3)
PROT UR QL STRIP: NEGATIVE
RBC # BLD AUTO: 4.21 X10(6)/MCL (ref 4.2–5.4)
RBC #/AREA URNS AUTO: ABNORMAL /HPF
SODIUM SERPL-SCNC: 143 MMOL/L (ref 136–145)
SP GR UR STRIP.AUTO: 1.02 (ref 1–1.03)
SQUAMOUS #/AREA URNS AUTO: ABNORMAL /HPF
UROBILINOGEN UR STRIP-ACNC: 0.2
WBC # BLD AUTO: 4.23 X10(3)/MCL (ref 4.5–11.5)
WBC #/AREA URNS AUTO: ABNORMAL /HPF

## 2024-11-09 PROCEDURE — 25000003 PHARM REV CODE 250: Performed by: EMERGENCY MEDICINE

## 2024-11-09 PROCEDURE — 85025 COMPLETE CBC W/AUTO DIFF WBC: CPT | Performed by: EMERGENCY MEDICINE

## 2024-11-09 PROCEDURE — 99284 EMERGENCY DEPT VISIT MOD MDM: CPT

## 2024-11-09 PROCEDURE — 80053 COMPREHEN METABOLIC PANEL: CPT | Performed by: EMERGENCY MEDICINE

## 2024-11-09 PROCEDURE — 81003 URINALYSIS AUTO W/O SCOPE: CPT | Performed by: EMERGENCY MEDICINE

## 2024-11-09 PROCEDURE — 83690 ASSAY OF LIPASE: CPT | Performed by: EMERGENCY MEDICINE

## 2024-11-09 RX ORDER — CLONIDINE HYDROCHLORIDE 0.1 MG/1
0.1 TABLET ORAL
Status: COMPLETED | OUTPATIENT
Start: 2024-11-09 | End: 2024-11-09

## 2024-11-09 RX ORDER — ONDANSETRON 4 MG/1
4 TABLET, ORALLY DISINTEGRATING ORAL EVERY 6 HOURS PRN
Qty: 14 TABLET | Refills: 0 | Status: SHIPPED | OUTPATIENT
Start: 2024-11-09

## 2024-11-09 RX ORDER — HYOSCYAMINE SULFATE 0.125 MG
125 TABLET ORAL EVERY 4 HOURS PRN
Qty: 14 TABLET | Refills: 0 | Status: SHIPPED | OUTPATIENT
Start: 2024-11-09 | End: 2024-12-09

## 2024-11-09 RX ADMIN — CLONIDINE HYDROCHLORIDE 0.1 MG: 0.1 TABLET ORAL at 02:11

## 2024-11-09 NOTE — ED PROVIDER NOTES
Encounter Date: 11/9/2024       History     Chief Complaint   Patient presents with    Abdominal Pain     Generalized Abd pain cramping all over started 4 days ago. Also c/o nausea.      Patient is a 54-year-old female presenting with complain of intermittent generalized abdominal pain times 2-3 days.  Patient states 2 days ago she was nauseous but denies any nausea currently.  She denies any fever chills.  No chest pain or cough.  No shortness of breath.  Patient denies any urinary symptoms.      Review of patient's allergies indicates:  No Known Allergies  Past Medical History:   Diagnosis Date    GERD (gastroesophageal reflux disease)     Hypertension      History reviewed. No pertinent surgical history.  No family history on file.  Social History     Tobacco Use    Smoking status: Never    Smokeless tobacco: Never   Substance Use Topics    Alcohol use: Never    Drug use: Never     Review of Systems   Constitutional: Negative.    Respiratory: Negative.     Cardiovascular: Negative.    Gastrointestinal: Negative.    Genitourinary: Negative.    Musculoskeletal: Negative.    Neurological: Negative.        Physical Exam     Initial Vitals [11/09/24 1332]   BP Pulse Resp Temp SpO2   (!) 209/139 80 20 97.2 °F (36.2 °C) 100 %      MAP       --         Physical Exam    Nursing note and vitals reviewed.  Constitutional: She appears well-developed and well-nourished.   HENT:   Head: Normocephalic.   Neck: Neck supple.   Normal range of motion.  Cardiovascular:  Normal rate, regular rhythm and normal heart sounds.           Pulmonary/Chest: Breath sounds normal. No respiratory distress. She has no wheezes. She has no rhonchi. She has no rales.   Abdominal: Abdomen is soft. She exhibits no distension. There is no abdominal tenderness. There is no guarding.   Musculoskeletal:         General: Normal range of motion.      Cervical back: Normal range of motion and neck supple.     Neurological: She is alert and oriented to  person, place, and time. She has normal strength.   Skin: Skin is warm.   Psychiatric: She has a normal mood and affect. Thought content normal.         ED Course   Procedures  Labs Reviewed   COMPREHENSIVE METABOLIC PANEL - Abnormal       Result Value    Sodium 143      Potassium 3.8      Chloride 108 (*)     CO2 26      Glucose 97      Blood Urea Nitrogen 10.0      Creatinine 0.72      Calcium 9.4      Protein Total 7.1      Albumin 3.8      Globulin 3.3      Albumin/Globulin Ratio 1.2      Bilirubin Total 0.5      ALP 91      ALT 21      AST 25      eGFR >60      Anion Gap 9.0      BUN/Creatinine Ratio 14     URINALYSIS, REFLEX TO URINE CULTURE - Abnormal    Color, UA Yellow      Appearance, UA Clear      Specific Gravity, UA 1.025      pH, UA 6.0      Protein, UA Negative      Glucose, UA Negative      Ketones, UA Negative      Blood, UA 2+ (*)     Bilirubin, UA Negative      Urobilinogen, UA 0.2      Nitrites, UA Negative      Leukocyte Esterase, UA Negative     CBC WITH DIFFERENTIAL - Abnormal    WBC 4.23 (*)     RBC 4.21      Hgb 13.6      Hct 39.1      MCV 92.9      MCH 32.3 (*)     MCHC 34.8      RDW 11.8      Platelet 303      MPV 8.2      Neut % 41.4      Lymph % 43.5      Mono % 11.6      Eos % 2.8      Basophil % 0.7      Lymph # 1.84      Neut # 1.75 (*)     Mono # 0.49      Eos # 0.12      Baso # 0.03      IG# 0.00      IG% 0.0      NRBC% 0.0     URINALYSIS, MICROSCOPIC - Abnormal    Bacteria, UA Rare      Mucous, UA Small (*)     RBC, UA 0-2      WBC, UA 0-2      Squamous Epithelial Cells, UA Moderate (*)    LIPASE - Normal    Lipase Level 49     CBC W/ AUTO DIFFERENTIAL    Narrative:     The following orders were created for panel order CBC auto differential.  Procedure                               Abnormality         Status                     ---------                               -----------         ------                     CBC with Differential[[id number]]       Abnormal            Final  result                 Please view results for these tests on the individual orders.          Imaging Results    None          Medications   cloNIDine tablet 0.1 mg (0.1 mg Oral Given 11/9/24 1431)     Medical Decision Making  Differential diagnosis: Viral syndrome, pancreatitis, ileus, UTI    Amount and/or Complexity of Data Reviewed  Labs: ordered.     Details: All labs are stable  Discussion of management or test interpretation with external provider(s): All labs are stable.  Patient has a benign abdominal exam.  Will DC home with a prescription for Levsin and Zofran.  Patient states she is taking her blood pressure medications.    Risk  Prescription drug management.               ED Course as of 11/09/24 1449   Sat Nov 09, 2024   1444 Blood pressure is trending down. [KG]   1444 There has been no vomiting while in the ER. At time of discharge, patient is in no apparent distress. [KG]      ED Course User Index  [KG] Ángel Moreno MD                           Clinical Impression:  Final diagnoses:  [R10.84] Generalized abdominal pain (Primary)  [R11.0] Nausea  [R03.0] Elevated blood pressure reading          ED Disposition Condition    Discharge Stable          ED Prescriptions       Medication Sig Dispense Start Date End Date Auth. Provider    hyoscyamine (ANASPAZ,LEVSIN) 0.125 mg Tab Take 1 tablet (125 mcg total) by mouth every 4 (four) hours as needed (Abdominal cramping). 14 tablet 11/9/2024 12/9/2024 Ángel Moreno MD    ondansetron (ZOFRAN-ODT) 4 MG TbDL Take 1 tablet (4 mg total) by mouth every 6 (six) hours as needed (Nausea). 14 tablet 11/9/2024 -- Ángel Moreno MD          Follow-up Information       Follow up With Specialties Details Why Contact Info    Kd Ocampo MD Family Medicine In 2 days  [address] [place] [address]  [phone]      [facility] Emergency Medicine  If symptoms worsen [address] [facility]  [place] [address]  [phone]             Ángel Moreno MD  11/09/24 1449       Ángel Moreno MD  11/09/24 1447

## 2024-11-11 ENCOUNTER — CLINICAL SUPPORT (OUTPATIENT)
Dept: REHABILITATION | Facility: HOSPITAL | Age: 54
End: 2024-11-11
Payer: MEDICAID

## 2024-11-11 DIAGNOSIS — M54.16 RADICULOPATHY, LUMBAR REGION: Primary | ICD-10-CM

## 2024-11-11 PROCEDURE — 97530 THERAPEUTIC ACTIVITIES: CPT

## 2024-11-11 PROCEDURE — 97110 THERAPEUTIC EXERCISES: CPT

## 2024-11-11 PROCEDURE — 97140 MANUAL THERAPY 1/> REGIONS: CPT

## 2024-11-11 NOTE — PROGRESS NOTES
Physical Therapy Treatment Note / Progress Note     Name: Shu Mcclure  Clinic Number: [id number]    Therapy Diagnosis:   Encounter Diagnosis   Name Primary?    Radiculopathy, lumbar region Yes     Physician: Kd Ocampo MD    Visit Date: 11/11/2024    Physician Orders: PT Eval and Treat  Medical Diagnosis from Referral: lumbar radiculopathy   Evaluation Date: 10/25/2024  Authorization Period Expiration: med [facility]   Plan of Care Expiration: 1/17/25  Visit # / Visits authorized: 7 / 36    Time In: 0949  Time Out: 1050  Total Billable Time: 61 minutes    Subjective     Patient reports: she believes therapy is helping with her pain, but states she continues to experience higher levels of pain.     CMS Impairment/Limitation/Restriction for FOTO Survey  Therapist reviewed FOTO scores for Shu Mcclure on 11/11/2024. FOTO documents entered into EPIC - see Media section.  FOTO filled out by: patient   Patient's Physical FS Primary Measure: 13 (predicted 34 by visit #13)  Risk Adjusted Statistical FOTO: 42  Category: Mobility  MDQ: 78% disability    Objective     EVALUATION (10/25/24) REASSESSMENT (11/11/24)   Posture/Appearance  Presents with back brace, a SPC, and a post op right knee brace      Left lateral shift (antalgic offload), mild rotational curve observed      Gait Analysis: antalgic     Posture/Appearance  Presents with back brace, a SPC, and a post op right knee brace      Mild Left lateral shift, mild rotational curve observed      Gait Analysis: antalgic (more erect with less compensation)   Palpation     R lumbar paraspinals TTP           Dermatomes     BLE WNL        Reflexes     Clonus: pos R ankle  Right: patellar: 1+ and Achilles: absent   Left: patellar: 2+ and Achilles: 2+       AROM     Flexion: severe loss*  Extension: severe loss*  Left rotation: min loss  Right rotation: min loss   Left lateral bending: min loss   Right lateral bending: min loss  AROM     Flexion: min loss  Extension:  mod loss  Left rotation: WNL  Right rotation: WNL   Left lateral bending: WNL  Right lateral bending: WNL    Myotomes     At least 3/5 gross BLE, not formally tested d/t pain complaints     Myotomes     At least 4/5 gross BLE   Hip/SI Clearance     MITCHELL: pos DELBERT (R LBP, L L hip pn)  FADDIR: pos DELBERT (R LBP, L L hip pn)  Hip IR ROM: 40 deg left, 35 deg right  Hip ER ROM: 40 deg left, 30* deg right     RLE downslip compared to LLE        Hip/SI Clearance     MITCHELL: pos DELBERT (R hip pn, L L hip pn)  FADDIR: pos DELBERT (R hip pn, L L hip pn)  Hip IR ROM: 40 deg left, 30 deg right  Hip ER ROM: 40 deg left, 30 deg right     RLE downslip compared to LLE (corrected w LLE distraction)      Special Tests     SLR: neg DELBERT   90/90 Hamstring: pos DELBERT         Shu received the following treatment:     Time Activities   Manual 8 min BLE long-axis distraction gr V   TherAct 30 min Functional tasks / reassessment    TherEx 23 min ROM and strengthening ex   Gait     Neuro Re-ed     Modalities  CP on LB at end of session    E-Stim     Dry Needling     Canalith Repositioning       Home Exercises Provided and Patient Education Provided     Education provided:   -Plan of care, HEP    Assessment     Pt is progressing as expected toward her personal and her PT goals. Pt continues to report higher levels of pain with reduced functional ability outside of PT, but she demonstrates improved objective measures across the board. Pt would benefit from continuing her current PT POC to address her deficits and to improve her functional status.    Patient prognosis is Good.      Anticipated barriers to physical therapy: previous history of non-compliance with PT     Goals: Shu Is progressing well towards her goals.    Short Term Goals: 6 weeks   Patient will report at least 10% disability reduction from initial MDQ score to indicate clinically significant functional improvement NOT MET   Patient will report at least 10 point increase on initial  FOTO score to indicate clinically significant functional improvement NOT MET   Patient will 50% reduction in pain. PROGRESSING   Pt will stand and ambulate with minimal antalgic compensation. PROGRESSING      Long Term Goals: 12 weeks   Patient will report at least 20% disability reduction from initial MDQ score to indicate clinically significant functional improvement NOT MET   Patient will report at least 20 point increase on initial FOTO score to indicate clinically significant functional improvement NOT MET   Patient will demonstrate normal lumbar spine and hip AROM. PROGRESSING  Pt will demonstrate at least 4+/5 BLE MMT. PROGRESSING   Pt will report 0-1/10 pain. PROGRESSING     Plan     Continue POC focused on improving lumbopelvic AROM and stability     3 x/week x 12 weeks    Dilan Xie, PT

## 2024-11-13 ENCOUNTER — CLINICAL SUPPORT (OUTPATIENT)
Dept: REHABILITATION | Facility: HOSPITAL | Age: 54
End: 2024-11-13
Payer: MEDICAID

## 2024-11-13 DIAGNOSIS — M54.16 RADICULOPATHY, LUMBAR REGION: Primary | ICD-10-CM

## 2024-11-13 PROCEDURE — 97110 THERAPEUTIC EXERCISES: CPT

## 2024-11-13 PROCEDURE — 97530 THERAPEUTIC ACTIVITIES: CPT

## 2024-11-13 NOTE — PROGRESS NOTES
Physical Therapy Treatment Note / Progress Note     Name: Shu Mcclure  Clinic Number: [id number]    Therapy Diagnosis:   Encounter Diagnosis   Name Primary?    Radiculopathy, lumbar region Yes     Physician: Kd Ocampo MD    Visit Date: 11/13/2024    Physician Orders: PT Eval and Treat  Medical Diagnosis from Referral: lumbar radiculopathy   Evaluation Date: 10/25/2024  Authorization Period Expiration: med [facility]   Plan of Care Expiration: 1/17/25  Visit # / Visits authorized: 8 / 36    Time In: 0940  Time Out: 1035  Total Billable Time: 55 minutes    Subjective     Patient reports: the weather change over the last 24 hours has led to an increase in global pain. Notes her LB is aching a bit more this morning.     CMS Impairment/Limitation/Restriction for FOTO Survey  Therapist reviewed FOTO scores for Shu Mcclure on 11/11/2024. FOTO documents entered into EPIC - see Media section.  FOTO filled out by: patient   Patient's Physical FS Primary Measure: 13 (predicted 34 by visit #13)  Risk Adjusted Statistical FOTO: 42  Category: Mobility  MDQ: 78% disability    Objective     EVALUATION (10/25/24) REASSESSMENT (11/11/24)   Posture/Appearance  Presents with back brace, a SPC, and a post op right knee brace      Left lateral shift (antalgic offload), mild rotational curve observed      Gait Analysis: antalgic     Posture/Appearance  Presents with back brace, a SPC, and a post op right knee brace      Mild Left lateral shift, mild rotational curve observed      Gait Analysis: antalgic (more erect with less compensation)   Palpation     R lumbar paraspinals TTP           Dermatomes     BLE WNL        Reflexes     Clonus: pos R ankle  Right: patellar: 1+ and Achilles: absent   Left: patellar: 2+ and Achilles: 2+       AROM     Flexion: severe loss*  Extension: severe loss*  Left rotation: min loss  Right rotation: min loss   Left lateral bending: min loss   Right lateral bending: min loss  AROM     Flexion:  min loss  Extension: mod loss  Left rotation: WNL  Right rotation: WNL   Left lateral bending: WNL  Right lateral bending: WNL    Myotomes     At least 3/5 gross BLE, not formally tested d/t pain complaints     Myotomes     At least 4/5 gross BLE   Hip/SI Clearance     MITCHELL: pos DELBERT (R LBP, L L hip pn)  FADDIR: pos DELBERT (R LBP, L L hip pn)  Hip IR ROM: 40 deg left, 35 deg right  Hip ER ROM: 40 deg left, 30* deg right     RLE downslip compared to LLE        Hip/SI Clearance     MITCHELL: pos DELBERT (R hip pn, L L hip pn)  FADDIR: pos DELBERT (R hip pn, L L hip pn)  Hip IR ROM: 40 deg left, 30 deg right  Hip ER ROM: 40 deg left, 30 deg right     RLE downslip compared to LLE (corrected w LLE distraction)      Special Tests     SLR: neg DELBERT   90/90 Hamstring: pos DELBERT         Shu received the following treatment:     Time Activities   Manual     TherAct 30 min Functional tasks    TherEx 25 min ROM and strengthening ex   Gait     Neuro Re-ed     Modalities  CP on LB at end of session    E-Stim     Dry Needling     Canalith Repositioning       Home Exercises Provided and Patient Education Provided     Education provided:   -Plan of care, HEP    Assessment     Continued POC this date with mild progression of various exercises. Pt with min complaints throughout.     Patient prognosis is Good.      Anticipated barriers to physical therapy: previous history of non-compliance with PT     Goals: Shu Is progressing well towards her goals.    Short Term Goals: 6 weeks   Patient will report at least 10% disability reduction from initial MDQ score to indicate clinically significant functional improvement NOT MET   Patient will report at least 10 point increase on initial FOTO score to indicate clinically significant functional improvement NOT MET   Patient will 50% reduction in pain. PROGRESSING   Pt will stand and ambulate with minimal antalgic compensation. PROGRESSING      Long Term Goals: 12 weeks   Patient will report at least  20% disability reduction from initial MDQ score to indicate clinically significant functional improvement NOT MET   Patient will report at least 20 point increase on initial FOTO score to indicate clinically significant functional improvement NOT MET   Patient will demonstrate normal lumbar spine and hip AROM. PROGRESSING  Pt will demonstrate at least 4+/5 BLE MMT. PROGRESSING   Pt will report 0-1/10 pain. PROGRESSING     Plan     Continue POC focused on improving lumbopelvic AROM and stability     3 x/week x 12 weeks    Dilan Xie, PT

## 2024-11-15 ENCOUNTER — CLINICAL SUPPORT (OUTPATIENT)
Dept: REHABILITATION | Facility: HOSPITAL | Age: 54
End: 2024-11-15
Payer: MEDICAID

## 2024-11-15 DIAGNOSIS — M54.16 RADICULOPATHY, LUMBAR REGION: Primary | ICD-10-CM

## 2024-11-15 PROCEDURE — 97110 THERAPEUTIC EXERCISES: CPT

## 2024-11-15 PROCEDURE — 97530 THERAPEUTIC ACTIVITIES: CPT

## 2024-11-15 NOTE — PROGRESS NOTES
Physical Therapy Treatment Note      Name: Shu Mcclure  Clinic Number: [id number]    Therapy Diagnosis:   Encounter Diagnosis   Name Primary?    Radiculopathy, lumbar region Yes     Physician: Kd Ocampo MD    Visit Date: 11/15/2024    Physician Orders: PT Eval and Treat  Medical Diagnosis from Referral: lumbar radiculopathy   Evaluation Date: 10/25/2024  Authorization Period Expiration: med [facility]   Plan of Care Expiration: 1/17/25  Visit # / Visits authorized: 9 / 36    Time In: 0950  Time Out: 1040  Total Billable Time: 50 minutes    Subjective     Patient reports: she believes that her pain is less intense and she is able to tolerate more movement since starting PT.     CMS Impairment/Limitation/Restriction for FOTO Survey  Therapist reviewed FOTO scores for Shu Mcclure on 11/11/2024. FOTO documents entered into EPIC - see Media section.  FOTO filled out by: patient   Patient's Physical FS Primary Measure: 13 (predicted 34 by visit #13)  Risk Adjusted Statistical FOTO: 42  Category: Mobility  MDQ: 78% disability    Objective     EVALUATION (10/25/24) REASSESSMENT (11/11/24)   Posture/Appearance  Presents with back brace, a SPC, and a post op right knee brace      Left lateral shift (antalgic offload), mild rotational curve observed      Gait Analysis: antalgic     Posture/Appearance  Presents with back brace, a SPC, and a post op right knee brace      Mild Left lateral shift, mild rotational curve observed      Gait Analysis: antalgic (more erect with less compensation)   Palpation     R lumbar paraspinals TTP           Dermatomes     BLE WNL        Reflexes     Clonus: pos R ankle  Right: patellar: 1+ and Achilles: absent   Left: patellar: 2+ and Achilles: 2+       AROM     Flexion: severe loss*  Extension: severe loss*  Left rotation: min loss  Right rotation: min loss   Left lateral bending: min loss   Right lateral bending: min loss  AROM     Flexion: min loss  Extension: mod loss  Left  rotation: WNL  Right rotation: WNL   Left lateral bending: WNL  Right lateral bending: WNL    Myotomes     At least 3/5 gross BLE, not formally tested d/t pain complaints     Myotomes     At least 4/5 gross BLE   Hip/SI Clearance     MITCHELL: pos DELBERT (R LBP, L L hip pn)  FADDIR: pos DELBERT (R LBP, L L hip pn)  Hip IR ROM: 40 deg left, 35 deg right  Hip ER ROM: 40 deg left, 30* deg right     RLE downslip compared to LLE        Hip/SI Clearance     MITCHELL: pos DELBERT (R hip pn, L L hip pn)  FADDIR: pos DELBERT (R hip pn, L L hip pn)  Hip IR ROM: 40 deg left, 30 deg right  Hip ER ROM: 40 deg left, 30 deg right     RLE downslip compared to LLE (corrected w LLE distraction)      Special Tests     SLR: neg DELBERT   90/90 Hamstring: pos DELBERT         Shu received the following treatment:     Time Activities   Manual     TherAct 30 min Functional tasks    TherEx 25 min ROM and strengthening ex   Gait     Neuro Re-ed     Modalities  CP on LB at end of session    E-Stim     Dry Needling     Canalith Repositioning       Home Exercises Provided and Patient Education Provided     Education provided:   -Plan of care, HEP    Assessment     Continued POC this date with addition of sitting on SB w TA. Pt with min complaints throughout.     Patient prognosis is Good.      Anticipated barriers to physical therapy: previous history of non-compliance with PT     Goals: Shu Is progressing well towards her goals.    Short Term Goals: 6 weeks   Patient will report at least 10% disability reduction from initial MDQ score to indicate clinically significant functional improvement NOT MET   Patient will report at least 10 point increase on initial FOTO score to indicate clinically significant functional improvement NOT MET   Patient will 50% reduction in pain. PROGRESSING   Pt will stand and ambulate with minimal antalgic compensation. PROGRESSING      Long Term Goals: 12 weeks   Patient will report at least 20% disability reduction from initial MDQ  score to indicate clinically significant functional improvement NOT MET   Patient will report at least 20 point increase on initial FOTO score to indicate clinically significant functional improvement NOT MET   Patient will demonstrate normal lumbar spine and hip AROM. PROGRESSING  Pt will demonstrate at least 4+/5 BLE MMT. PROGRESSING   Pt will report 0-1/10 pain. PROGRESSING     Plan     Continue POC focused on improving lumbopelvic AROM and stability     3 x/week x 12 weeks    Dilan Xie, PT

## 2024-11-18 ENCOUNTER — CLINICAL SUPPORT (OUTPATIENT)
Dept: REHABILITATION | Facility: HOSPITAL | Age: 54
End: 2024-11-18
Payer: MEDICAID

## 2024-11-18 DIAGNOSIS — M54.16 RADICULOPATHY, LUMBAR REGION: Primary | ICD-10-CM

## 2024-11-18 PROCEDURE — 97110 THERAPEUTIC EXERCISES: CPT

## 2024-11-18 PROCEDURE — 97530 THERAPEUTIC ACTIVITIES: CPT

## 2024-11-18 NOTE — PROGRESS NOTES
Physical Therapy Treatment Note      Name: Shu Mcclure  Clinic Number: [id number]    Therapy Diagnosis:   Encounter Diagnosis   Name Primary?    Radiculopathy, lumbar region Yes     Physician: Kd Ocampo MD    Visit Date: 11/18/2024    Physician Orders: PT Eval and Treat  Medical Diagnosis from Referral: lumbar radiculopathy   Evaluation Date: 10/25/2024  Authorization Period Expiration: med [facility]   Plan of Care Expiration: 1/17/25  Visit # / Visits authorized: 9 / 36    Time In: 0950  Time Out: 1036  Total Billable Time: 46 minutes    Subjective     Patient reports: no new complaints this date. Notes she went to a wedding for a little while over the weekend. Pt presents ambulating more erect this date.    CMS Impairment/Limitation/Restriction for FOTO Survey  Therapist reviewed FOTO scores for Shu Mcclure on 11/11/2024. FOTO documents entered into [employer] - see Media section.  FOTO filled out by: patient   Patient's Physical FS Primary Measure: 13 (predicted 34 by visit #13)  Risk Adjusted Statistical FOTO: 42  Category: Mobility  MDQ: 78% disability    Objective     EVALUATION (10/25/24) REASSESSMENT (11/11/24)   Posture/Appearance  Presents with back brace, a SPC, and a post op right knee brace      Left lateral shift (antalgic offload), mild rotational curve observed      Gait Analysis: antalgic     Posture/Appearance  Presents with back brace, a SPC, and a post op right knee brace      Mild Left lateral shift, mild rotational curve observed      Gait Analysis: antalgic (more erect with less compensation)   Palpation     R lumbar paraspinals TTP           Dermatomes     BLE WNL        Reflexes     Clonus: pos R ankle  Right: patellar: 1+ and Achilles: absent   Left: patellar: 2+ and Achilles: 2+       AROM     Flexion: severe loss*  Extension: severe loss*  Left rotation: min loss  Right rotation: min loss   Left lateral bending: min loss   Right lateral bending: min loss  AROM     Flexion: min  loss  Extension: mod loss  Left rotation: WNL  Right rotation: WNL   Left lateral bending: WNL  Right lateral bending: WNL    Myotomes     At least 3/5 gross BLE, not formally tested d/t pain complaints     Myotomes     At least 4/5 gross BLE   Hip/SI Clearance     MITCHELL: pos DELBERT (R LBP, L L hip pn)  FADDIR: pos DELBERT (R LBP, L L hip pn)  Hip IR ROM: 40 deg left, 35 deg right  Hip ER ROM: 40 deg left, 30* deg right     RLE downslip compared to LLE        Hip/SI Clearance     MITCHELL: pos DELBERT (R hip pn, L L hip pn)  FADDIR: pos DELBERT (R hip pn, L L hip pn)  Hip IR ROM: 40 deg left, 30 deg right  Hip ER ROM: 40 deg left, 30 deg right     RLE downslip compared to LLE (corrected w LLE distraction)      Special Tests     SLR: neg DELBERT   90/90 Hamstring: pos DELBERT         Shu received the following treatment:     Time Activities   Manual     TherAct 23 min Functional tasks    TherEx 23 min ROM and strengthening ex   Gait     Neuro Re-ed     Modalities  CP on LB at end of session    E-Stim     Dry Needling     Canalith Repositioning       Home Exercises Provided and Patient Education Provided     Education provided:   -Plan of care, HEP    Assessment     Continued POC this date. Progressed isometric hip abduction to S/L against gravity. Pt with min complaints throughout.     Patient prognosis is Good.      Anticipated barriers to physical therapy: previous history of non-compliance with PT     Goals: Shu Is progressing well towards her goals.    Short Term Goals: 6 weeks   Patient will report at least 10% disability reduction from initial MDQ score to indicate clinically significant functional improvement NOT MET   Patient will report at least 10 point increase on initial FOTO score to indicate clinically significant functional improvement NOT MET   Patient will 50% reduction in pain. PROGRESSING   Pt will stand and ambulate with minimal antalgic compensation. PROGRESSING      Long Term Goals: 12 weeks   Patient will  report at least 20% disability reduction from initial MDQ score to indicate clinically significant functional improvement NOT MET   Patient will report at least 20 point increase on initial FOTO score to indicate clinically significant functional improvement NOT MET   Patient will demonstrate normal lumbar spine and hip AROM. PROGRESSING  Pt will demonstrate at least 4+/5 BLE MMT. PROGRESSING   Pt will report 0-1/10 pain. PROGRESSING     Plan     Continue POC focused on improving lumbopelvic AROM and stability     3 x/week x 12 weeks    Dilan Xie, PT

## 2024-11-20 ENCOUNTER — CLINICAL SUPPORT (OUTPATIENT)
Dept: REHABILITATION | Facility: HOSPITAL | Age: 54
End: 2024-11-20
Payer: MEDICAID

## 2024-11-20 DIAGNOSIS — M54.16 RADICULOPATHY, LUMBAR REGION: Primary | ICD-10-CM

## 2024-11-20 PROCEDURE — 97530 THERAPEUTIC ACTIVITIES: CPT

## 2024-11-20 PROCEDURE — 97140 MANUAL THERAPY 1/> REGIONS: CPT

## 2024-11-20 PROCEDURE — 97110 THERAPEUTIC EXERCISES: CPT

## 2024-11-20 NOTE — PROGRESS NOTES
Physical Therapy Treatment Note      Name: Shu Mcclure  Clinic Number: [id number]    Therapy Diagnosis:   Encounter Diagnosis   Name Primary?    Radiculopathy, lumbar region Yes     Physician: Kd Ocampo MD    Visit Date: 11/20/2024    Physician Orders: PT Eval and Treat  Medical Diagnosis from Referral: lumbar radiculopathy   Evaluation Date: 10/25/2024  Authorization Period Expiration: med [facility]   Plan of Care Expiration: 1/17/25  Visit # / Visits authorized: 11 / 36    Time In: 0950  Time Out: 1045  Total Billable Time: 55 minutes    Subjective     Patient reports: her right hip was sore and stiff after the new exercise last session referring to S/L hip abduction.     CMS Impairment/Limitation/Restriction for FOTO Survey  Therapist reviewed FOTO scores for Shu Mcclure on 11/11/2024. FOTO documents entered into [employer] - see Media section.  FOTO filled out by: patient   Patient's Physical FS Primary Measure: 13 (predicted 34 by visit #13)  Risk Adjusted Statistical FOTO: 42  Category: Mobility  MDQ: 78% disability    Objective     EVALUATION (10/25/24) REASSESSMENT (11/11/24)   Posture/Appearance  Presents with back brace, a SPC, and a post op right knee brace      Left lateral shift (antalgic offload), mild rotational curve observed      Gait Analysis: antalgic     Posture/Appearance  Presents with back brace, a SPC, and a post op right knee brace      Mild Left lateral shift, mild rotational curve observed      Gait Analysis: antalgic (more erect with less compensation)   Palpation     R lumbar paraspinals TTP           Dermatomes     BLE WNL        Reflexes     Clonus: pos R ankle  Right: patellar: 1+ and Achilles: absent   Left: patellar: 2+ and Achilles: 2+       AROM     Flexion: severe loss*  Extension: severe loss*  Left rotation: min loss  Right rotation: min loss   Left lateral bending: min loss   Right lateral bending: min loss  AROM     Flexion: min loss  Extension: mod loss  Left  rotation: WNL  Right rotation: WNL   Left lateral bending: WNL  Right lateral bending: WNL    Myotomes     At least 3/5 gross BLE, not formally tested d/t pain complaints     Myotomes     At least 4/5 gross BLE   Hip/SI Clearance     MITCHELL: pos DELBERT (R LBP, L L hip pn)  FADDIR: pos DELBERT (R LBP, L L hip pn)  Hip IR ROM: 40 deg left, 35 deg right  Hip ER ROM: 40 deg left, 30* deg right     RLE downslip compared to LLE        Hip/SI Clearance     MITCHELL: pos DELBERT (R hip pn, L L hip pn)  FADDIR: pos DELBERT (R hip pn, L L hip pn)  Hip IR ROM: 40 deg left, 30 deg right  Hip ER ROM: 40 deg left, 30 deg right     RLE downslip compared to LLE (corrected w LLE distraction)      Special Tests     SLR: neg DELBERT   90/90 Hamstring: pos DELBERT         Shu received the following treatment:     Time Activities   Manual 9 min RLE long-axis distraction gr II oscillations and gr V manip  MET to improve R ant rot inn   TherAct 23 min Functional tasks    TherEx 23 min ROM and strengthening ex   Gait     Neuro Re-ed     Modalities  CP on LB at end of session    E-Stim     Dry Needling     Canalith Repositioning       Home Exercises Provided and Patient Education Provided     Education provided:   -Plan of care, HEP    Assessment     Continued POC this date. Performed hip joint mobilizations this date d/t pt's c/o right hip flexor region pain during hip flexion. Added supine hip flexor stretch to address and pt noted a deep stretch during the activity. Pt with min complaints throughout.     Patient prognosis is Good.      Anticipated barriers to physical therapy: previous history of non-compliance with PT     Goals: Shu Is progressing well towards her goals.    Short Term Goals: 6 weeks   Patient will report at least 10% disability reduction from initial MDQ score to indicate clinically significant functional improvement NOT MET   Patient will report at least 10 point increase on initial FOTO score to indicate clinically significant  functional improvement NOT MET   Patient will 50% reduction in pain. PROGRESSING   Pt will stand and ambulate with minimal antalgic compensation. PROGRESSING      Long Term Goals: 12 weeks   Patient will report at least 20% disability reduction from initial MDQ score to indicate clinically significant functional improvement NOT MET   Patient will report at least 20 point increase on initial FOTO score to indicate clinically significant functional improvement NOT MET   Patient will demonstrate normal lumbar spine and hip AROM. PROGRESSING  Pt will demonstrate at least 4+/5 BLE MMT. PROGRESSING   Pt will report 0-1/10 pain. PROGRESSING     Plan     Continue POC focused on improving lumbopelvic AROM and stability     3 x/week x 12 weeks    Dilan Xie, PT

## 2024-11-22 ENCOUNTER — CLINICAL SUPPORT (OUTPATIENT)
Dept: REHABILITATION | Facility: HOSPITAL | Age: 54
End: 2024-11-22
Payer: MEDICAID

## 2024-11-22 DIAGNOSIS — M54.16 RADICULOPATHY, LUMBAR REGION: Primary | ICD-10-CM

## 2024-11-22 PROCEDURE — 97110 THERAPEUTIC EXERCISES: CPT

## 2024-11-22 PROCEDURE — 97530 THERAPEUTIC ACTIVITIES: CPT

## 2024-11-22 NOTE — PROGRESS NOTES
Physical Therapy Treatment Note      Name: Shu Mcclure  Clinic Number: [id number]    Therapy Diagnosis:   Encounter Diagnosis   Name Primary?    Radiculopathy, lumbar region Yes     Physician: Kd Ocampo MD    Visit Date: 11/22/2024    Physician Orders: PT Eval and Treat  Medical Diagnosis from Referral: lumbar radiculopathy   Evaluation Date: 10/25/2024  Authorization Period Expiration: med [facility]   Plan of Care Expiration: 1/17/25  Visit # / Visits authorized: 12 / 36    Time In: 0955  Time Out: 1041  Total Billable Time: 46 minutes    Subjective     Patient reports:she continues to have difficulty and pain in the lumbar region while sleeping.      CMS Impairment/Limitation/Restriction for FOTO Survey  Therapist reviewed FOTO scores for Shu Mcclure on 11/11/2024. FOTO documents entered into [employer] - see Media section.  FOTO filled out by: patient   Patient's Physical FS Primary Measure: 13 (predicted 34 by visit #13)  Risk Adjusted Statistical FOTO: 42  Category: Mobility  MDQ: 78% disability    Objective     EVALUATION (10/25/24) REASSESSMENT (11/11/24)   Posture/Appearance  Presents with back brace, a SPC, and a post op right knee brace      Left lateral shift (antalgic offload), mild rotational curve observed      Gait Analysis: antalgic     Posture/Appearance  Presents with back brace, a SPC, and a post op right knee brace      Mild Left lateral shift, mild rotational curve observed      Gait Analysis: antalgic (more erect with less compensation)   Palpation     R lumbar paraspinals TTP           Dermatomes     BLE WNL        Reflexes     Clonus: pos R ankle  Right: patellar: 1+ and Achilles: absent   Left: patellar: 2+ and Achilles: 2+       AROM     Flexion: severe loss*  Extension: severe loss*  Left rotation: min loss  Right rotation: min loss   Left lateral bending: min loss   Right lateral bending: min loss  AROM     Flexion: min loss  Extension: mod loss  Left rotation: WNL  Right  rotation: WNL   Left lateral bending: WNL  Right lateral bending: WNL    Myotomes     At least 3/5 gross BLE, not formally tested d/t pain complaints     Myotomes     At least 4/5 gross BLE   Hip/SI Clearance     MITCHELL: pos DELBERT (R LBP, L L hip pn)  FADDIR: pos DELBERT (R LBP, L L hip pn)  Hip IR ROM: 40 deg left, 35 deg right  Hip ER ROM: 40 deg left, 30* deg right     RLE downslip compared to LLE        Hip/SI Clearance     MITCHELL: pos DELBERT (R hip pn, L L hip pn)  FADDIR: pos DELBERT (R hip pn, L L hip pn)  Hip IR ROM: 40 deg left, 30 deg right  Hip ER ROM: 40 deg left, 30 deg right     RLE downslip compared to LLE (corrected w LLE distraction)      Special Tests     SLR: neg DELBERT   90/90 Hamstring: pos DELBERT         Shu received the following treatment:     Time Activities   Manual Not performed RLE long-axis distraction gr II oscillations and gr V manip  MET to improve R ant rot inn   TherAct 23 min Functional tasks    TherEx 23 min ROM and strengthening ex   Gait     Neuro Re-ed     Modalities  CP on LB at end of session    E-Stim     Dry Needling     Canalith Repositioning       Home Exercises Provided and Patient Education Provided     Education provided:   -Plan of care, HEP    Assessment     Continued POC this date. Performed more core stabilization activities on SB without complaints.     Patient prognosis is Good.      Anticipated barriers to physical therapy: previous history of non-compliance with PT     Goals: Shu Is progressing well towards her goals.    Short Term Goals: 6 weeks   Patient will report at least 10% disability reduction from initial MDQ score to indicate clinically significant functional improvement NOT MET   Patient will report at least 10 point increase on initial FOTO score to indicate clinically significant functional improvement NOT MET   Patient will 50% reduction in pain. PROGRESSING   Pt will stand and ambulate with minimal antalgic compensation. PROGRESSING      Long Term Goals:  12 weeks   Patient will report at least 20% disability reduction from initial MDQ score to indicate clinically significant functional improvement NOT MET   Patient will report at least 20 point increase on initial FOTO score to indicate clinically significant functional improvement NOT MET   Patient will demonstrate normal lumbar spine and hip AROM. PROGRESSING  Pt will demonstrate at least 4+/5 BLE MMT. PROGRESSING   Pt will report 0-1/10 pain. PROGRESSING     Plan     Continue POC focused on improving lumbopelvic AROM and stability     3 x/week x 12 weeks    Dilan Xei, PT

## 2024-11-25 ENCOUNTER — CLINICAL SUPPORT (OUTPATIENT)
Dept: REHABILITATION | Facility: HOSPITAL | Age: 54
End: 2024-11-25
Payer: MEDICAID

## 2024-11-25 DIAGNOSIS — M54.16 RADICULOPATHY, LUMBAR REGION: Primary | ICD-10-CM

## 2024-11-25 PROCEDURE — 97110 THERAPEUTIC EXERCISES: CPT

## 2024-11-25 PROCEDURE — 97530 THERAPEUTIC ACTIVITIES: CPT

## 2024-11-25 NOTE — PROGRESS NOTES
Physical Therapy Treatment Note / Progress Note     Name: Shu Mcclure  Clinic Number: [id number]    Therapy Diagnosis:   Encounter Diagnosis   Name Primary?    Radiculopathy, lumbar region Yes     Physician: Kd Ocampo MD    Visit Date: 11/25/2024    Physician Orders: PT Eval and Treat  Medical Diagnosis from Referral: lumbar radiculopathy   Evaluation Date: 10/25/2024  Authorization Period Expiration: med [facility]   Plan of Care Expiration: 1/17/25  Visit # / Visits authorized: 13 / 36    Time In: 0955  Time Out: 1050  Total Billable Time: 55 minutes    Subjective     Patient reports: she buried a family member this weekend and is feeling down as a result.   Pt states she feels like she is 80% better, but notes she hurts more when she is at home.     CMS Impairment/Limitation/Restriction for FOTO Survey  Therapist reviewed FOTO scores for Shu Mcclure on 11/25/2024. FOTO documents entered into [employer] - see Media section.  FOTO filled out by: patient   Patient's Physical FS Primary Measure: 26 (predicted 34 by visit #13)  Risk Adjusted Statistical FOTO: 42  Category: Mobility  MDQ: 68% disability    Objective     EVALUATION (10/25/24) REASSESSMENT (11/11/24)   Posture/Appearance  Presents with back brace, a SPC, and a post op right knee brace      Left lateral shift (antalgic offload), mild rotational curve observed      Gait Analysis: antalgic     Posture/Appearance  Presents with back brace, a SPC, and a post op right knee brace      Mild Left lateral shift, mild rotational curve observed      Gait Analysis: antalgic (more erect with less compensation)   Palpation     R lumbar paraspinals TTP           Dermatomes     BLE WNL        Reflexes     Clonus: pos R ankle  Right: patellar: 1+ and Achilles: absent   Left: patellar: 2+ and Achilles: 2+       AROM     Flexion: severe loss*  Extension: severe loss*  Left rotation: min loss  Right rotation: min loss   Left lateral bending: min loss   Right lateral  bending: min loss  AROM     Flexion: min loss  Extension: mod loss  Left rotation: WNL  Right rotation: WNL   Left lateral bending: WNL  Right lateral bending: WNL    Myotomes     At least 3/5 gross BLE, not formally tested d/t pain complaints     Myotomes     At least 4/5 gross BLE   Hip/SI Clearance     MITCHELL: pos DELBERT (R LBP, L L hip pn)  FADDIR: pos DELBERT (R LBP, L L hip pn)  Hip IR ROM: 40 deg left, 35 deg right  Hip ER ROM: 40 deg left, 30* deg right     RLE downslip compared to LLE        Hip/SI Clearance     MITCHELL: pos DELBERT (R hip pn, L L hip pn)  FADDIR: pos DELBERT (R hip pn, L L hip pn)  Hip IR ROM: 40 deg left, 30 deg right  Hip ER ROM: 40 deg left, 30 deg right     RLE downslip compared to LLE (corrected w LLE distraction)      Special Tests     SLR: neg DELBERT   90/90 Hamstring: pos DELBERT         Shu received the following treatment:     Time Activities   Manual Not performed RLE long-axis distraction gr II oscillations and gr V manip  MET to improve R ant rot inn   TherAct 30 min Functional tasks    TherEx 25 min ROM and strengthening ex   Gait     Neuro Re-ed     Modalities  CP on LB at end of session    E-Stim     Dry Needling     Canalith Repositioning       Home Exercises Provided and Patient Education Provided     Education provided:   -Plan of care, HEP    Assessment     Pt is progressing toward her set goals. Pt reports improved subjective status this date. Pt would benefit from continuing with her current PT POC to address continued deficits and improve her overall functional status.     Patient prognosis is Good.      Anticipated barriers to physical therapy: previous history of non-compliance with PT     Goals: Shu Is progressing well towards her goals.    Short Term Goals: 6 weeks   Patient will report at least 10% disability reduction from initial MDQ score to indicate clinically significant functional improvement MET   Patient will report at least 10 point increase on initial FOTO score to  indicate clinically significant functional improvement MET   Patient will 50% reduction in pain. MET   Pt will stand and ambulate with minimal antalgic compensation. PROGRESSING      Long Term Goals: 12 weeks   Patient will report at least 20% disability reduction from initial MDQ score to indicate clinically significant functional improvement PROGRESSING   Patient will report at least 20 point increase on initial FOTO score to indicate clinically significant functional improvement PROGRESSING   Patient will demonstrate normal lumbar spine and hip AROM. PROGRESSING  Pt will demonstrate at least 4+/5 BLE MMT. PROGRESSING   Pt will report 0-1/10 pain. PROGRESSING     Plan     Continue POC focused on improving lumbopelvic AROM and stability     3 x/week x 12 weeks    Dilan Xie, PT

## 2024-11-27 ENCOUNTER — CLINICAL SUPPORT (OUTPATIENT)
Dept: REHABILITATION | Facility: HOSPITAL | Age: 54
End: 2024-11-27
Payer: MEDICAID

## 2024-11-27 DIAGNOSIS — M54.16 RADICULOPATHY, LUMBAR REGION: Primary | ICD-10-CM

## 2024-11-27 PROCEDURE — 97110 THERAPEUTIC EXERCISES: CPT

## 2024-11-27 PROCEDURE — 97530 THERAPEUTIC ACTIVITIES: CPT

## 2024-11-27 NOTE — PROGRESS NOTES
Physical Therapy Treatment Note     Name: Shu Mcclure  Clinic Number: [id number]    Therapy Diagnosis:   Encounter Diagnosis   Name Primary?    Radiculopathy, lumbar region Yes     Physician: Kd Ocampo MD    Visit Date: 11/27/2024    Physician Orders: PT Eval and Treat  Medical Diagnosis from Referral: lumbar radiculopathy   Evaluation Date: 10/25/2024  Authorization Period Expiration: med [facility]   Plan of Care Expiration: 1/17/25  Visit # / Visits authorized: 14 / 36    Time In: 0953  Time Out: 1053  Total Billable Time: 60 minutes    Subjective     Patient reports: she had to take a few seated rest breaks d/t her back aching while she was preparing food for Thanksgiving last night. Pt adds that her right arm (shoulder and hand) hurt with overuse. Pt denies any neck pain or N/T.    CMS Impairment/Limitation/Restriction for FOTO Survey  Therapist reviewed FOTO scores for Shu Mcclure on 11/25/2024. FOTO documents entered into [employer] - see Media section.  FOTO filled out by: patient   Patient's Physical FS Primary Measure: 26 (predicted 34 by visit #13)  Risk Adjusted Statistical FOTO: 42  Category: Mobility  MDQ: 68% disability    Objective     EVALUATION (10/25/24) REASSESSMENT (11/11/24)   Posture/Appearance  Presents with back brace, a SPC, and a post op right knee brace      Left lateral shift (antalgic offload), mild rotational curve observed      Gait Analysis: antalgic     Posture/Appearance  Presents with back brace, a SPC, and a post op right knee brace      Mild Left lateral shift, mild rotational curve observed      Gait Analysis: antalgic (more erect with less compensation)   Palpation     R lumbar paraspinals TTP           Dermatomes     BLE WNL        Reflexes     Clonus: pos R ankle  Right: patellar: 1+ and Achilles: absent   Left: patellar: 2+ and Achilles: 2+       AROM     Flexion: severe loss*  Extension: severe loss*  Left rotation: min loss  Right rotation: min loss   Left lateral  bending: min loss   Right lateral bending: min loss  AROM     Flexion: min loss  Extension: mod loss  Left rotation: WNL  Right rotation: WNL   Left lateral bending: WNL  Right lateral bending: WNL    Myotomes     At least 3/5 gross BLE, not formally tested d/t pain complaints     Myotomes     At least 4/5 gross BLE   Hip/SI Clearance     MITCHELL: pos DELBERT (R LBP, L L hip pn)  FADDIR: pos DELBERT (R LBP, L L hip pn)  Hip IR ROM: 40 deg left, 35 deg right  Hip ER ROM: 40 deg left, 30* deg right     RLE downslip compared to LLE        Hip/SI Clearance     MITCHELL: pos DELBERT (R hip pn, L L hip pn)  FADDIR: pos DELBERT (R hip pn, L L hip pn)  Hip IR ROM: 40 deg left, 30 deg right  Hip ER ROM: 40 deg left, 30 deg right     RLE downslip compared to LLE (corrected w LLE distraction)      Special Tests     SLR: neg DELBERT   90/90 Hamstring: pos DELBERT         Shu received the following treatment:     Time Activities   Manual Not performed RLE long-axis distraction gr II oscillations and gr V manip  MET to improve R ant rot inn   TherAct 30 min Functional tasks    TherEx 30 min ROM and strengthening ex   Gait     Neuro Re-ed     Modalities  CP on LB at end of session    E-Stim     Dry Needling     Canalith Repositioning       Home Exercises Provided and Patient Education Provided     Education provided:   -Plan of care, HEP    Assessment     Continued POC this date. PT issued hand putty for pt to work her right hand complaints. Pt with min c/o LBP during session. Pt continues to tolerate a little more activity each session.    Patient prognosis is Good.      Anticipated barriers to physical therapy: previous history of non-compliance with PT     Goals: Shu Is progressing well towards her goals.    Short Term Goals: 6 weeks   Patient will report at least 10% disability reduction from initial MDQ score to indicate clinically significant functional improvement MET   Patient will report at least 10 point increase on initial FOTO score to  indicate clinically significant functional improvement MET   Patient will 50% reduction in pain. MET   Pt will stand and ambulate with minimal antalgic compensation. PROGRESSING      Long Term Goals: 12 weeks   Patient will report at least 20% disability reduction from initial MDQ score to indicate clinically significant functional improvement PROGRESSING   Patient will report at least 20 point increase on initial FOTO score to indicate clinically significant functional improvement PROGRESSING   Patient will demonstrate normal lumbar spine and hip AROM. PROGRESSING  Pt will demonstrate at least 4+/5 BLE MMT. PROGRESSING   Pt will report 0-1/10 pain. PROGRESSING     Plan     Continue POC focused on improving lumbopelvic AROM and stability     3 x/week x 12 weeks    Dilan Xie, PT

## 2024-11-29 ENCOUNTER — CLINICAL SUPPORT (OUTPATIENT)
Dept: REHABILITATION | Facility: HOSPITAL | Age: 54
End: 2024-11-29
Payer: MEDICAID

## 2024-11-29 DIAGNOSIS — M54.16 RADICULOPATHY, LUMBAR REGION: Primary | ICD-10-CM

## 2024-11-29 PROCEDURE — 97110 THERAPEUTIC EXERCISES: CPT

## 2024-11-29 PROCEDURE — 97530 THERAPEUTIC ACTIVITIES: CPT

## 2024-11-29 NOTE — PROGRESS NOTES
Physical Therapy Treatment Note     Name: Shu Mcclure  Clinic Number: [id number]    Therapy Diagnosis:   Encounter Diagnosis   Name Primary?    Radiculopathy, lumbar region Yes     Physician: Kd Ocampo MD    Visit Date: 11/29/2024    Physician Orders: PT Eval and Treat  Medical Diagnosis from Referral: lumbar radiculopathy   Evaluation Date: 10/25/2024  Authorization Period Expiration: med nec   Plan of Care Expiration: 1/17/25  Visit # / Visits authorized: 14 / 36    Time In: 1000  Time Out: 1055  Total Billable Time: 55 minutes    Subjective     Patient reports: no new complaints this date.     CMS Impairment/Limitation/Restriction for FOTO Survey  Therapist reviewed FOTO scores for Shu Mcclure on 11/25/2024. FOTO documents entered into EPIC - see Media section.  FOTO filled out by: patient   Patient's Physical FS Primary Measure: 26 (predicted 34 by visit #13)  Risk Adjusted Statistical FOTO: 42  Category: Mobility  MDQ: 68% disability    Objective     EVALUATION (10/25/24) REASSESSMENT (11/11/24)   Posture/Appearance  Presents with back brace, a SPC, and a post op right knee brace      Left lateral shift (antalgic offload), mild rotational curve observed      Gait Analysis: antalgic     Posture/Appearance  Presents with back brace, a SPC, and a post op right knee brace      Mild Left lateral shift, mild rotational curve observed      Gait Analysis: antalgic (more erect with less compensation)   Palpation     R lumbar paraspinals TTP           Dermatomes     BLE WNL        Reflexes     Clonus: pos R ankle  Right: patellar: 1+ and Achilles: absent   Left: patellar: 2+ and Achilles: 2+       AROM     Flexion: severe loss*  Extension: severe loss*  Left rotation: min loss  Right rotation: min loss   Left lateral bending: min loss   Right lateral bending: min loss  AROM     Flexion: min loss  Extension: mod loss  Left rotation: WNL  Right rotation: WNL   Left lateral bending: WNL  Right lateral  bending: WNL    Myotomes     At least 3/5 gross BLE, not formally tested d/t pain complaints     Myotomes     At least 4/5 gross BLE   Hip/SI Clearance     MITCHELL: pos DELBERT (R LBP, L L hip pn)  FADDIR: pos DELBERT (R LBP, L L hip pn)  Hip IR ROM: 40 deg left, 35 deg right  Hip ER ROM: 40 deg left, 30* deg right     RLE downslip compared to LLE        Hip/SI Clearance     MITCHELL: pos DELBERT (R hip pn, L L hip pn)  FADDIR: pos DELBERT (R hip pn, L L hip pn)  Hip IR ROM: 40 deg left, 30 deg right  Hip ER ROM: 40 deg left, 30 deg right     RLE downslip compared to LLE (corrected w LLE distraction)      Special Tests     SLR: neg DELBERT   90/90 Hamstring: pos DELBERT         Shu received the following treatment:     Time Activities   Manual     TherAct 30 min Functional tasks    TherEx 25 min ROM and strengthening ex   Gait     Neuro Re-ed     Modalities  CP on LB at end of session    E-Stim     Dry Needling     Canalith Repositioning       Home Exercises Provided and Patient Education Provided     Education provided:   -Plan of care, HEP    Assessment     Continued POC this date. Pt with min c/o LBP during session. Pt continues to tolerate a little more activity each session.    Patient prognosis is Good.      Anticipated barriers to physical therapy: previous history of non-compliance with PT     Goals: Shu Is progressing well towards her goals.    Short Term Goals: 6 weeks   Patient will report at least 10% disability reduction from initial MDQ score to indicate clinically significant functional improvement MET   Patient will report at least 10 point increase on initial FOTO score to indicate clinically significant functional improvement MET   Patient will 50% reduction in pain. MET   Pt will stand and ambulate with minimal antalgic compensation. PROGRESSING      Long Term Goals: 12 weeks   Patient will report at least 20% disability reduction from initial MDQ score to indicate clinically significant functional improvement  PROGRESSING   Patient will report at least 20 point increase on initial FOTO score to indicate clinically significant functional improvement PROGRESSING   Patient will demonstrate normal lumbar spine and hip AROM. PROGRESSING  Pt will demonstrate at least 4+/5 BLE MMT. PROGRESSING   Pt will report 0-1/10 pain. PROGRESSING     Plan     Continue POC focused on improving lumbopelvic AROM and stability     3 x/week x 12 weeks    Dilan Xie, PT

## 2024-12-02 ENCOUNTER — CLINICAL SUPPORT (OUTPATIENT)
Dept: REHABILITATION | Facility: HOSPITAL | Age: 54
End: 2024-12-02
Payer: MEDICAID

## 2024-12-02 DIAGNOSIS — M54.16 RADICULOPATHY, LUMBAR REGION: Primary | ICD-10-CM

## 2024-12-02 PROCEDURE — 97530 THERAPEUTIC ACTIVITIES: CPT

## 2024-12-02 PROCEDURE — 97110 THERAPEUTIC EXERCISES: CPT

## 2024-12-02 NOTE — PROGRESS NOTES
Physical Therapy Treatment Note     Name: Shu Mcclure  Clinic Number: [id number]    Therapy Diagnosis:   Encounter Diagnosis   Name Primary?    Radiculopathy, lumbar region Yes       Physician: Kd Ocampo MD    Visit Date: 12/2/2024    Physician Orders: PT Eval and Treat  Medical Diagnosis from Referral: lumbar radiculopathy   Evaluation Date: 10/25/2024  Authorization Period Expiration: med [facility]   Plan of Care Expiration: 1/17/25  Visit # / Visits authorized: 15 / 36    Time In: 955  Time Out: 1050  Total Billable Time: 55 minutes    Subjective     Patient reports: no new complaints this date. Notes she is feeling better, but still experiences pain.     CMS Impairment/Limitation/Restriction for FOTO Survey  Therapist reviewed FOTO scores for Shu Mcclure on 11/25/2024. FOTO documents entered into EPIC - see Media section.  FOTO filled out by: patient   Patient's Physical FS Primary Measure: 26 (predicted 34 by visit #13)  Risk Adjusted Statistical FOTO: 42  Category: Mobility  MDQ: 68% disability    Objective     EVALUATION (10/25/24) REASSESSMENT (11/11/24)   Posture/Appearance  Presents with back brace, a SPC, and a post op right knee brace      Left lateral shift (antalgic offload), mild rotational curve observed      Gait Analysis: antalgic     Posture/Appearance  Presents with back brace, a SPC, and a post op right knee brace      Mild Left lateral shift, mild rotational curve observed      Gait Analysis: antalgic (more erect with less compensation)   Palpation     R lumbar paraspinals TTP           Dermatomes     BLE WNL        Reflexes     Clonus: pos R ankle  Right: patellar: 1+ and Achilles: absent   Left: patellar: 2+ and Achilles: 2+       AROM     Flexion: severe loss*  Extension: severe loss*  Left rotation: min loss  Right rotation: min loss   Left lateral bending: min loss   Right lateral bending: min loss  AROM     Flexion: min loss  Extension: mod loss  Left rotation: WNL  Right  rotation: WNL   Left lateral bending: WNL  Right lateral bending: WNL    Myotomes     At least 3/5 gross BLE, not formally tested d/t pain complaints     Myotomes     At least 4/5 gross BLE   Hip/SI Clearance     MITCHELL: pos DELBERT (R LBP, L L hip pn)  FADDIR: pos DELBERT (R LBP, L L hip pn)  Hip IR ROM: 40 deg left, 35 deg right  Hip ER ROM: 40 deg left, 30* deg right     RLE downslip compared to LLE        Hip/SI Clearance     MITCHELL: pos DELBERT (R hip pn, L L hip pn)  FADDIR: pos DELBERT (R hip pn, L L hip pn)  Hip IR ROM: 40 deg left, 30 deg right  Hip ER ROM: 40 deg left, 30 deg right     RLE downslip compared to LLE (corrected w LLE distraction)      Special Tests     SLR: neg DELBERT   90/90 Hamstring: pos DELBERT         Shu received the following treatment:     Time Activities   Manual     TherAct 30 min Functional tasks    TherEx 25 min ROM and strengthening ex   Gait     Neuro Re-ed     Modalities  CP on LB at end of session    E-Stim     Dry Needling     Canalith Repositioning       Home Exercises Provided and Patient Education Provided     Education provided:   -Plan of care, HEP    Assessment     Continued POC this date. Pt with min c/o LBP during session. Progressed entire POC this date without c/o increased pain. Pt entered session standing and ambulating more erect.     Patient prognosis is Good.      Anticipated barriers to physical therapy: previous history of non-compliance with PT     Goals: Shu Is progressing well towards her goals.    Short Term Goals: 6 weeks   Patient will report at least 10% disability reduction from initial MDQ score to indicate clinically significant functional improvement MET   Patient will report at least 10 point increase on initial FOTO score to indicate clinically significant functional improvement MET   Patient will 50% reduction in pain. MET   Pt will stand and ambulate with minimal antalgic compensation. PROGRESSING      Long Term Goals: 12 weeks   Patient will report at least  20% disability reduction from initial MDQ score to indicate clinically significant functional improvement PROGRESSING   Patient will report at least 20 point increase on initial FOTO score to indicate clinically significant functional improvement PROGRESSING   Patient will demonstrate normal lumbar spine and hip AROM. PROGRESSING  Pt will demonstrate at least 4+/5 BLE MMT. PROGRESSING   Pt will report 0-1/10 pain. PROGRESSING     Plan     Continue POC focused on improving lumbopelvic AROM and stability     3 x/week x 12 weeks    Dilan Xie, PT

## 2024-12-04 ENCOUNTER — CLINICAL SUPPORT (OUTPATIENT)
Dept: REHABILITATION | Facility: HOSPITAL | Age: 54
End: 2024-12-04
Payer: MEDICAID

## 2024-12-04 DIAGNOSIS — M54.16 RADICULOPATHY, LUMBAR REGION: Primary | ICD-10-CM

## 2024-12-04 PROCEDURE — 97110 THERAPEUTIC EXERCISES: CPT

## 2024-12-04 PROCEDURE — 97530 THERAPEUTIC ACTIVITIES: CPT

## 2024-12-04 NOTE — PROGRESS NOTES
Physical Therapy Treatment Note     Name: Shu Mcclure  Clinic Number: [id number]    Therapy Diagnosis:   Encounter Diagnosis   Name Primary?    Radiculopathy, lumbar region Yes       Physician: Kd Ocampo MD    Visit Date: 12/4/2024    Physician Orders: PT Eval and Treat  Medical Diagnosis from Referral: lumbar radiculopathy   Evaluation Date: 10/25/2024  Authorization Period Expiration: med nec   Plan of Care Expiration: 1/17/25  Visit # / Visits authorized: 16 / 36    Time In: 958  Time Out: 1058  Total Billable Time: 60 minutes    Subjective     Patient reports: no soreness following last session.     CMS Impairment/Limitation/Restriction for FOTO Survey  Therapist reviewed FOTO scores for Shu Mcclure on 11/25/2024. FOTO documents entered into EPIC - see Media section.  FOTO filled out by: patient   Patient's Physical FS Primary Measure: 26 (predicted 34 by visit #13)  Risk Adjusted Statistical FOTO: 42  Category: Mobility  MDQ: 68% disability    Objective     EVALUATION (10/25/24) REASSESSMENT (11/11/24)   Posture/Appearance  Presents with back brace, a SPC, and a post op right knee brace      Left lateral shift (antalgic offload), mild rotational curve observed      Gait Analysis: antalgic     Posture/Appearance  Presents with back brace, a SPC, and a post op right knee brace      Mild Left lateral shift, mild rotational curve observed      Gait Analysis: antalgic (more erect with less compensation)   Palpation     R lumbar paraspinals TTP           Dermatomes     BLE WNL        Reflexes     Clonus: pos R ankle  Right: patellar: 1+ and Achilles: absent   Left: patellar: 2+ and Achilles: 2+       AROM     Flexion: severe loss*  Extension: severe loss*  Left rotation: min loss  Right rotation: min loss   Left lateral bending: min loss   Right lateral bending: min loss  AROM     Flexion: min loss  Extension: mod loss  Left rotation: WNL  Right rotation: WNL   Left lateral bending: WNL  Right  lateral bending: WNL    Myotomes     At least 3/5 gross BLE, not formally tested d/t pain complaints     Myotomes     At least 4/5 gross BLE   Hip/SI Clearance     MITCHELL: pos DELBERT (R LBP, L L hip pn)  FADDIR: pos DELBERT (R LBP, L L hip pn)  Hip IR ROM: 40 deg left, 35 deg right  Hip ER ROM: 40 deg left, 30* deg right     RLE downslip compared to LLE        Hip/SI Clearance     MITCHELL: pos DELBERT (R hip pn, L L hip pn)  FADDIR: pos DELBERT (R hip pn, L L hip pn)  Hip IR ROM: 40 deg left, 30 deg right  Hip ER ROM: 40 deg left, 30 deg right     RLE downslip compared to LLE (corrected w LLE distraction)      Special Tests     SLR: neg DELBERT   90/90 Hamstring: pos DELBERT         Shu received the following treatment:     Time Activities   Manual     TherAct 30 min Functional tasks    TherEx 30 min ROM and strengthening ex   Gait     Neuro Re-ed     Modalities  CP on LB at end of session    E-Stim     Dry Needling     Canalith Repositioning       Home Exercises Provided and Patient Education Provided     Education provided:   -Plan of care, HEP    Assessment     Continued POC this date. Pt with min c/o pain and fatigue throughout. No changes to session this date.     Patient prognosis is Good.      Anticipated barriers to physical therapy: previous history of non-compliance with PT     Goals: Shu Is progressing well towards her goals.    Short Term Goals: 6 weeks   Patient will report at least 10% disability reduction from initial MDQ score to indicate clinically significant functional improvement MET   Patient will report at least 10 point increase on initial FOTO score to indicate clinically significant functional improvement MET   Patient will 50% reduction in pain. MET   Pt will stand and ambulate with minimal antalgic compensation. PROGRESSING      Long Term Goals: 12 weeks   Patient will report at least 20% disability reduction from initial MDQ score to indicate clinically significant functional improvement PROGRESSING    Patient will report at least 20 point increase on initial FOTO score to indicate clinically significant functional improvement PROGRESSING   Patient will demonstrate normal lumbar spine and hip AROM. PROGRESSING  Pt will demonstrate at least 4+/5 BLE MMT. PROGRESSING   Pt will report 0-1/10 pain. PROGRESSING     Plan     Continue POC focused on improving lumbopelvic AROM and stability     3 x/week x 12 weeks    Dilan Xie, PT

## 2024-12-06 ENCOUNTER — CLINICAL SUPPORT (OUTPATIENT)
Dept: REHABILITATION | Facility: HOSPITAL | Age: 54
End: 2024-12-06
Payer: MEDICAID

## 2024-12-06 DIAGNOSIS — M54.16 RADICULOPATHY, LUMBAR REGION: Primary | ICD-10-CM

## 2024-12-06 PROCEDURE — 97530 THERAPEUTIC ACTIVITIES: CPT

## 2024-12-06 PROCEDURE — 97110 THERAPEUTIC EXERCISES: CPT

## 2024-12-06 PROCEDURE — 97010 HOT OR COLD PACKS THERAPY: CPT

## 2024-12-06 NOTE — PROGRESS NOTES
Physical Therapy Treatment Note     Name: Shu Mcclure  Clinic Number: [id number]    Therapy Diagnosis:   Encounter Diagnosis   Name Primary?    Radiculopathy, lumbar region Yes       Physician: Kd Ocampo MD    Visit Date: 12/6/2024    Physician Orders: PT Eval and Treat  Medical Diagnosis from Referral: lumbar radiculopathy   Evaluation Date: 10/25/2024  Authorization Period Expiration: med [facility]   Plan of Care Expiration: 1/17/25  Visit # / Visits authorized: 17 / 36    Time In: 950  Time Out: 1050  Total Billable Time: 60 minutes    Subjective     Patient reports: her back was hurting more last night and this morning likely from the cold weather.      CMS Impairment/Limitation/Restriction for FOTO Survey  Therapist reviewed FOTO scores for Shu Mcclure on 11/25/2024. FOTO documents entered into EPIC - see Media section.  FOTO filled out by: patient   Patient's Physical FS Primary Measure: 26 (predicted 34 by visit #13)  Risk Adjusted Statistical FOTO: 42  Category: Mobility  MDQ: 68% disability    Objective     EVALUATION (10/25/24) REASSESSMENT (11/11/24)   Posture/Appearance  Presents with back brace, a SPC, and a post op right knee brace      Left lateral shift (antalgic offload), mild rotational curve observed      Gait Analysis: antalgic     Posture/Appearance  Presents with back brace, a SPC, and a post op right knee brace      Mild Left lateral shift, mild rotational curve observed      Gait Analysis: antalgic (more erect with less compensation)   Palpation     R lumbar paraspinals TTP           Dermatomes     BLE WNL        Reflexes     Clonus: pos R ankle  Right: patellar: 1+ and Achilles: absent   Left: patellar: 2+ and Achilles: 2+       AROM     Flexion: severe loss*  Extension: severe loss*  Left rotation: min loss  Right rotation: min loss   Left lateral bending: min loss   Right lateral bending: min loss  AROM     Flexion: min loss  Extension: mod loss  Left rotation: WNL  Right  rotation: WNL   Left lateral bending: WNL  Right lateral bending: WNL    Myotomes     At least 3/5 gross BLE, not formally tested d/t pain complaints     Myotomes     At least 4/5 gross BLE   Hip/SI Clearance     MITCHELL: pos DELBERT (R LBP, L L hip pn)  FADDIR: pos DELBERT (R LBP, L L hip pn)  Hip IR ROM: 40 deg left, 35 deg right  Hip ER ROM: 40 deg left, 30* deg right     RLE downslip compared to LLE        Hip/SI Clearance     MITCHELL: pos DELBERT (R hip pn, L L hip pn)  FADDIR: pos DELBERT (R hip pn, L L hip pn)  Hip IR ROM: 40 deg left, 30 deg right  Hip ER ROM: 40 deg left, 30 deg right     RLE downslip compared to LLE (corrected w LLE distraction)      Special Tests     SLR: neg DELBERT   90/90 Hamstring: pos DELBERT         Shu received the following treatment:     Time Activities   Manual     TherAct 30 min Functional tasks    TherEx 30 min ROM and strengthening ex   Gait     Neuro Re-ed     Modalities 15 min Pt laid on MH for LB during table ex   E-Stim     Dry Needling     Canalith Repositioning       Home Exercises Provided and Patient Education Provided     Education provided:   -Plan of care, HEP    Assessment     Continued POC this date. Pt with min c/o pain and fatigue throughout. Added MH to LB during table exercises given pt's complaints upon entering.     Patient prognosis is Good.      Anticipated barriers to physical therapy: previous history of non-compliance with PT     Goals: Shu Is progressing well towards her goals.    Short Term Goals: 6 weeks   Patient will report at least 10% disability reduction from initial MDQ score to indicate clinically significant functional improvement MET   Patient will report at least 10 point increase on initial FOTO score to indicate clinically significant functional improvement MET   Patient will 50% reduction in pain. MET   Pt will stand and ambulate with minimal antalgic compensation. PROGRESSING      Long Term Goals: 12 weeks   Patient will report at least 20% disability  reduction from initial MDQ score to indicate clinically significant functional improvement PROGRESSING   Patient will report at least 20 point increase on initial FOTO score to indicate clinically significant functional improvement PROGRESSING   Patient will demonstrate normal lumbar spine and hip AROM. PROGRESSING  Pt will demonstrate at least 4+/5 BLE MMT. PROGRESSING   Pt will report 0-1/10 pain. PROGRESSING     Plan     Continue POC focused on improving lumbopelvic AROM and stability     3 x/week x 12 weeks    Dilan Xie, PT

## 2024-12-09 ENCOUNTER — CLINICAL SUPPORT (OUTPATIENT)
Dept: REHABILITATION | Facility: HOSPITAL | Age: 54
End: 2024-12-09
Payer: MEDICAID

## 2024-12-09 DIAGNOSIS — M54.16 RADICULOPATHY, LUMBAR REGION: Primary | ICD-10-CM

## 2024-12-09 PROCEDURE — 97110 THERAPEUTIC EXERCISES: CPT

## 2024-12-09 PROCEDURE — 97530 THERAPEUTIC ACTIVITIES: CPT

## 2024-12-09 PROCEDURE — 97010 HOT OR COLD PACKS THERAPY: CPT

## 2024-12-09 NOTE — PROGRESS NOTES
Physical Therapy Treatment Note / Progress Note     Name: Shu Mcclure  Clinic Number: [id number]    Therapy Diagnosis:   Encounter Diagnosis   Name Primary?    Radiculopathy, lumbar region Yes     Physician: Kd Ocampo MD    Visit Date: 12/9/2024    Physician Orders: PT Eval and Treat  Medical Diagnosis from Referral: lumbar radiculopathy   Evaluation Date: 10/25/2024  Authorization Period Expiration: med [facility]   Plan of Care Expiration: 1/17/25  Visit # / Visits authorized: 18 / 36    Time In: 955  Time Out: 1045  Total Billable Time: 50 minutes    Subjective     Patient reports: her back is hurting today following her lifting something heavy at home two days ago.       CMS Impairment/Limitation/Restriction for FOTO Survey  Therapist reviewed FOTO scores for Shu Mcclure on 12/9/2024. FOTO documents entered into EPIC - see Media section.  FOTO filled out by: patient   Patient's Physical FS Primary Measure: 24 (predicted 34 by visit #13)  Risk Adjusted Statistical FOTO: 42  Category: Mobility  MDQ: 70% disability    Objective     EVALUATION (10/25/24) REASSESSMENT (11/11/24)   Posture/Appearance  Presents with back brace, a SPC, and a post op right knee brace      Left lateral shift (antalgic offload), mild rotational curve observed      Gait Analysis: antalgic     Posture/Appearance  Presents with back brace, a SPC, and a post op right knee brace      Mild Left lateral shift, mild rotational curve observed      Gait Analysis: antalgic (more erect with less compensation)   Palpation     R lumbar paraspinals TTP           Dermatomes     BLE WNL        Reflexes     Clonus: pos R ankle  Right: patellar: 1+ and Achilles: absent   Left: patellar: 2+ and Achilles: 2+       AROM     Flexion: severe loss*  Extension: severe loss*  Left rotation: min loss  Right rotation: min loss   Left lateral bending: min loss   Right lateral bending: min loss  AROM     Flexion: min loss  Extension: mod loss  Left  rotation: WNL  Right rotation: WNL   Left lateral bending: WNL  Right lateral bending: WNL    Myotomes     At least 3/5 gross BLE, not formally tested d/t pain complaints     Myotomes     At least 4/5 gross BLE   Hip/SI Clearance     MITCHELL: pos DELBERT (R LBP, L L hip pn)  FADDIR: pos DELBERT (R LBP, L L hip pn)  Hip IR ROM: 40 deg left, 35 deg right  Hip ER ROM: 40 deg left, 30* deg right     RLE downslip compared to LLE        Hip/SI Clearance     MITCHELL: pos DELBERT (R hip pn, L L hip pn)  FADDIR: pos DELBERT (R hip pn, L L hip pn)  Hip IR ROM: 40 deg left, 30 deg right  Hip ER ROM: 40 deg left, 30 deg right     RLE downslip compared to LLE (corrected w LLE distraction)      Special Tests     SLR: neg DELBERT   90/90 Hamstring: pos DELBERT         Shu received the following treatment:     Time Activities   Manual     TherAct 25 min Functional tasks    TherEx 25 min ROM and strengthening ex   Gait     Neuro Re-ed     Modalities 15 min Pt laid on MH for LB during table ex   E-Stim     Dry Needling     Canalith Repositioning       Home Exercises Provided and Patient Education Provided     Education provided:   -Plan of care, HEP    Assessment     Pt continues to show mild progress each week with an improved tolerance to either increased repetitions or resistance. Pt would benefit from continuing her current POC to address deficits and to improve overall functional status.     Patient prognosis is Good.      Anticipated barriers to physical therapy: previous history of non-compliance with PT     Goals: Shu Is progressing well towards her goals.    Short Term Goals: 6 weeks   Patient will report at least 10% disability reduction from initial MDQ score to indicate clinically significant functional improvement MET   Patient will report at least 10 point increase on initial FOTO score to indicate clinically significant functional improvement MET   Patient will 50% reduction in pain. MET   Pt will stand and ambulate with minimal  antalgic compensation. MET      Long Term Goals: 12 weeks   Patient will report at least 20% disability reduction from initial MDQ score to indicate clinically significant functional improvement PROGRESSING   Patient will report at least 20 point increase on initial FOTO score to indicate clinically significant functional improvement PROGRESSING   Patient will demonstrate normal lumbar spine and hip AROM. PROGRESSING  Pt will demonstrate at least 4+/5 BLE MMT. PROGRESSING   Pt will report 0-1/10 pain. PROGRESSING     Plan     Continue POC focused on improving lumbopelvic AROM and stability     3 x/week x 6 weeks    Dilan Xie, PT

## 2024-12-11 ENCOUNTER — CLINICAL SUPPORT (OUTPATIENT)
Dept: REHABILITATION | Facility: HOSPITAL | Age: 54
End: 2024-12-11
Payer: MEDICAID

## 2024-12-11 DIAGNOSIS — M54.16 RADICULOPATHY, LUMBAR REGION: Primary | ICD-10-CM

## 2024-12-11 PROCEDURE — 97110 THERAPEUTIC EXERCISES: CPT

## 2024-12-11 PROCEDURE — 97140 MANUAL THERAPY 1/> REGIONS: CPT

## 2024-12-11 PROCEDURE — 97530 THERAPEUTIC ACTIVITIES: CPT

## 2024-12-11 NOTE — PROGRESS NOTES
Physical Therapy Treatment Note     Name: Shu Mcclure  Clinic Number: [id number]    Therapy Diagnosis:   Encounter Diagnosis   Name Primary?    Radiculopathy, lumbar region Yes     Physician: Kd Ocampo MD    Visit Date: 12/11/2024    Physician Orders: PT Eval and Treat  Medical Diagnosis from Referral: lumbar radiculopathy   Evaluation Date: 10/25/2024  Authorization Period Expiration: med nec   Plan of Care Expiration: 1/17/25  Visit # / Visits authorized: 19 / 36    Time In: 955  Time Out: 1100  Total Billable Time: 65 minutes    Subjective     Patient reports: still experiencing some LB stiffness today.     CMS Impairment/Limitation/Restriction for FOTO Survey  Therapist reviewed FOTO scores for Shu Mcclure on 12/9/2024. FOTO documents entered into EPIC - see Media section.  FOTO filled out by: patient   Patient's Physical FS Primary Measure: 24 (predicted 34 by visit #13)  Risk Adjusted Statistical FOTO: 42  Category: Mobility  MDQ: 70% disability    Objective     EVALUATION (10/25/24) REASSESSMENT (11/11/24)   Posture/Appearance  Presents with back brace, a SPC, and a post op right knee brace      Left lateral shift (antalgic offload), mild rotational curve observed      Gait Analysis: antalgic     Posture/Appearance  Presents with back brace, a SPC, and a post op right knee brace      Mild Left lateral shift, mild rotational curve observed      Gait Analysis: antalgic (more erect with less compensation)   Palpation     R lumbar paraspinals TTP           Dermatomes     BLE WNL        Reflexes     Clonus: pos R ankle  Right: patellar: 1+ and Achilles: absent   Left: patellar: 2+ and Achilles: 2+       AROM     Flexion: severe loss*  Extension: severe loss*  Left rotation: min loss  Right rotation: min loss   Left lateral bending: min loss   Right lateral bending: min loss  AROM     Flexion: min loss  Extension: mod loss  Left rotation: WNL  Right rotation: WNL   Left lateral bending:  WNL  Right lateral bending: WNL    Myotomes     At least 3/5 gross BLE, not formally tested d/t pain complaints     Myotomes     At least 4/5 gross BLE   Hip/SI Clearance     MITCHELL: pos DELBERT (R LBP, L L hip pn)  FADDIR: pos DELBERT (R LBP, L L hip pn)  Hip IR ROM: 40 deg left, 35 deg right  Hip ER ROM: 40 deg left, 30* deg right     RLE downslip compared to LLE        Hip/SI Clearance     MITCHELL: pos DELBERT (R hip pn, L L hip pn)  FADDIR: pos DELBERT (R hip pn, L L hip pn)  Hip IR ROM: 40 deg left, 30 deg right  Hip ER ROM: 40 deg left, 30 deg right     RLE downslip compared to LLE (corrected w LLE distraction)      Special Tests     SLR: neg DELBERT   90/90 Hamstring: pos DELBERT         Shu received the following treatment:     Time Activities   Manual 10 min  Prone: STM to bilateral lumbar paraspinals    TherAct 30 min Functional tasks    TherEx 25 min ROM and strengthening ex   Gait     Neuro Re-ed     Modalities 15 min Pt laid on MH for LB during table ex   E-Stim     Dry Needling     Canalith Repositioning       Home Exercises Provided and Patient Education Provided     Education provided:   -Plan of care, HEP    Assessment     Continued POC this date with addition of STM to lumbar paraspinals given her entering complaints. Pt responded well to STM. Pt with min c/o pain and fatigue throughout session as usual.     Patient prognosis is Good.      Anticipated barriers to physical therapy: previous history of non-compliance with PT     Goals: Shu Is progressing well towards her goals.    Short Term Goals: 6 weeks   Patient will report at least 10% disability reduction from initial MDQ score to indicate clinically significant functional improvement MET   Patient will report at least 10 point increase on initial FOTO score to indicate clinically significant functional improvement MET   Patient will 50% reduction in pain. MET   Pt will stand and ambulate with minimal antalgic compensation. MET      Long Term Goals: 12 weeks    Patient will report at least 20% disability reduction from initial MDQ score to indicate clinically significant functional improvement PROGRESSING   Patient will report at least 20 point increase on initial FOTO score to indicate clinically significant functional improvement PROGRESSING   Patient will demonstrate normal lumbar spine and hip AROM. PROGRESSING  Pt will demonstrate at least 4+/5 BLE MMT. PROGRESSING   Pt will report 0-1/10 pain. PROGRESSING     Plan     Continue POC focused on improving lumbopelvic AROM and stability     3 x/week x 6 weeks    Dilan Xie, PT

## 2024-12-13 ENCOUNTER — CLINICAL SUPPORT (OUTPATIENT)
Dept: REHABILITATION | Facility: HOSPITAL | Age: 54
End: 2024-12-13
Payer: MEDICAID

## 2024-12-13 DIAGNOSIS — M54.16 RADICULOPATHY, LUMBAR REGION: Primary | ICD-10-CM

## 2024-12-13 PROCEDURE — 97140 MANUAL THERAPY 1/> REGIONS: CPT

## 2024-12-13 PROCEDURE — 97110 THERAPEUTIC EXERCISES: CPT

## 2024-12-13 PROCEDURE — 97530 THERAPEUTIC ACTIVITIES: CPT

## 2024-12-13 PROCEDURE — 97010 HOT OR COLD PACKS THERAPY: CPT

## 2024-12-13 NOTE — PROGRESS NOTES
Physical Therapy Treatment Note     Name: Suh Mcclure  Clinic Number: [id number]    Therapy Diagnosis:   Encounter Diagnosis   Name Primary?    Radiculopathy, lumbar region Yes       Physician: Kd Ocampo MD    Visit Date: 12/13/2024    Physician Orders: PT Eval and Treat  Medical Diagnosis from Referral: lumbar radiculopathy   Evaluation Date: 10/25/2024  Authorization Period Expiration: med [facility]   Plan of Care Expiration: 1/17/25  Visit # / Visits authorized: 20 / 36    Time In: 945  Time Out: 1048  Total Billable Time: 63 minutes    Subjective     Patient reports: she felt better following last session, but is experiencing LB stiffness this morning.      CMS Impairment/Limitation/Restriction for FOTO Survey  Therapist reviewed FOTO scores for Shu Mcclure on 12/9/2024. FOTO documents entered into EPIC - see Media section.  FOTO filled out by: patient   Patient's Physical FS Primary Measure: 24 (predicted 34 by visit #13)  Risk Adjusted Statistical FOTO: 42  Category: Mobility  MDQ: 70% disability    Objective     EVALUATION (10/25/24) REASSESSMENT (11/11/24)   Posture/Appearance  Presents with back brace, a SPC, and a post op right knee brace      Left lateral shift (antalgic offload), mild rotational curve observed      Gait Analysis: antalgic     Posture/Appearance  Presents with back brace, a SPC, and a post op right knee brace      Mild Left lateral shift, mild rotational curve observed      Gait Analysis: antalgic (more erect with less compensation)   Palpation     R lumbar paraspinals TTP           Dermatomes     BLE WNL        Reflexes     Clonus: pos R ankle  Right: patellar: 1+ and Achilles: absent   Left: patellar: 2+ and Achilles: 2+       AROM     Flexion: severe loss*  Extension: severe loss*  Left rotation: min loss  Right rotation: min loss   Left lateral bending: min loss   Right lateral bending: min loss  AROM     Flexion: min loss  Extension: mod loss  Left rotation: WNL  Right  rotation: WNL   Left lateral bending: WNL  Right lateral bending: WNL    Myotomes     At least 3/5 gross BLE, not formally tested d/t pain complaints     Myotomes     At least 4/5 gross BLE   Hip/SI Clearance     MITCHELL: pos DELBERT (R LBP, L L hip pn)  FADDIR: pos DELBERT (R LBP, L L hip pn)  Hip IR ROM: 40 deg left, 35 deg right  Hip ER ROM: 40 deg left, 30* deg right     RLE downslip compared to LLE        Hip/SI Clearance     MITCHELL: pos DELBERT (R hip pn, L L hip pn)  FADDIR: pos DELBERT (R hip pn, L L hip pn)  Hip IR ROM: 40 deg left, 30 deg right  Hip ER ROM: 40 deg left, 30 deg right     RLE downslip compared to LLE (corrected w LLE distraction)      Special Tests     SLR: neg DELBERT   90/90 Hamstring: pos DELBERT         Shu received the following treatment:     Time Activities   Manual 10 min  Prone: STM to bilateral lumbar paraspinals    TherAct 30 min Functional tasks    TherEx 23 min ROM and strengthening ex   Gait     Neuro Re-ed     Modalities 15 min Pt laid on MH for LB during table ex   E-Stim     Dry Needling     Canalith Repositioning       Home Exercises Provided and Patient Education Provided     Education provided:   -Plan of care, HEP    Assessment     Continued POC this date. Pt visibly feeling better with more upright posture and gait and reduced antalgic movement. PT discussed being more active during the days not at therapy with pt who was in agreement.     Patient prognosis is Good.      Anticipated barriers to physical therapy: previous history of non-compliance with PT     Goals: Shu Is progressing well towards her goals.    Short Term Goals: 6 weeks   Patient will report at least 10% disability reduction from initial MDQ score to indicate clinically significant functional improvement MET   Patient will report at least 10 point increase on initial FOTO score to indicate clinically significant functional improvement MET   Patient will 50% reduction in pain. MET   Pt will stand and ambulate with minimal  antalgic compensation. MET      Long Term Goals: 12 weeks   Patient will report at least 20% disability reduction from initial MDQ score to indicate clinically significant functional improvement PROGRESSING   Patient will report at least 20 point increase on initial FOTO score to indicate clinically significant functional improvement PROGRESSING   Patient will demonstrate normal lumbar spine and hip AROM. PROGRESSING  Pt will demonstrate at least 4+/5 BLE MMT. PROGRESSING   Pt will report 0-1/10 pain. PROGRESSING     Plan     Continue POC focused on improving lumbopelvic AROM and stability     3 x/week x 6 weeks    Dilan Xie, PT

## 2024-12-16 ENCOUNTER — CLINICAL SUPPORT (OUTPATIENT)
Dept: REHABILITATION | Facility: HOSPITAL | Age: 54
End: 2024-12-16
Payer: MEDICAID

## 2024-12-16 DIAGNOSIS — M54.16 RADICULOPATHY, LUMBAR REGION: Primary | ICD-10-CM

## 2024-12-16 PROCEDURE — 97110 THERAPEUTIC EXERCISES: CPT

## 2024-12-16 PROCEDURE — 97010 HOT OR COLD PACKS THERAPY: CPT

## 2024-12-16 PROCEDURE — 97530 THERAPEUTIC ACTIVITIES: CPT

## 2024-12-16 PROCEDURE — 97140 MANUAL THERAPY 1/> REGIONS: CPT

## 2024-12-16 NOTE — PROGRESS NOTES
Physical Therapy Treatment Note     Name: Shu Mcclure  Clinic Number: [id number]    Therapy Diagnosis:   Encounter Diagnosis   Name Primary?    Radiculopathy, lumbar region Yes       Physician: Kd Ocampo MD    Visit Date: 12/16/2024    Physician Orders: PT Eval and Treat  Medical Diagnosis from Referral: lumbar radiculopathy   Evaluation Date: 10/25/2024  Authorization Period Expiration: med [facility]   Plan of Care Expiration: 1/17/25  Visit # / Visits authorized: 21 / 36    Time In: 954  Time Out: 1100  Total Billable Time: 66 minutes    Subjective     Patient reports: she did more activity over the weekend and notes that she tolerated it well. Pt states that she did not sleep well last night d/t LBP.     CMS Impairment/Limitation/Restriction for FOTO Survey  Therapist reviewed FOTO scores for Shu Mcclure on 12/9/2024. FOTO documents entered into EPIC - see Media section.  FOTO filled out by: patient   Patient's Physical FS Primary Measure: 24 (predicted 34 by visit #13)  Risk Adjusted Statistical FOTO: 42  Category: Mobility  MDQ: 70% disability    Objective     EVALUATION (10/25/24) REASSESSMENT (11/11/24)   Posture/Appearance  Presents with back brace, a SPC, and a post op right knee brace      Left lateral shift (antalgic offload), mild rotational curve observed      Gait Analysis: antalgic     Posture/Appearance  Presents with back brace, a SPC, and a post op right knee brace      Mild Left lateral shift, mild rotational curve observed      Gait Analysis: antalgic (more erect with less compensation)   Palpation     R lumbar paraspinals TTP           Dermatomes     BLE WNL        Reflexes     Clonus: pos R ankle  Right: patellar: 1+ and Achilles: absent   Left: patellar: 2+ and Achilles: 2+       AROM     Flexion: severe loss*  Extension: severe loss*  Left rotation: min loss  Right rotation: min loss   Left lateral bending: min loss   Right lateral bending: min loss  AROM     Flexion: min  loss  Extension: mod loss  Left rotation: WNL  Right rotation: WNL   Left lateral bending: WNL  Right lateral bending: WNL    Myotomes     At least 3/5 gross BLE, not formally tested d/t pain complaints     Myotomes     At least 4/5 gross BLE   Hip/SI Clearance     MITCHELL: pos DELBERT (R LBP, L L hip pn)  FADDIR: pos DELBERT (R LBP, L L hip pn)  Hip IR ROM: 40 deg left, 35 deg right  Hip ER ROM: 40 deg left, 30* deg right     RLE downslip compared to LLE        Hip/SI Clearance     MITCHELL: pos DELBERT (R hip pn, L L hip pn)  FADDIR: pos DELBERT (R hip pn, L L hip pn)  Hip IR ROM: 40 deg left, 30 deg right  Hip ER ROM: 40 deg left, 30 deg right     RLE downslip compared to LLE (corrected w LLE distraction)      Special Tests     SLR: neg DELBERT   90/90 Hamstring: pos DELBERT         Shu received the following treatment:     Time Activities   Manual 10 min  Prone: STM to R lumbar paraspinals   S/L: lumbar gapping gr V bilaterally    TherAct 28 min Functional tasks    TherEx 28 min ROM and strengthening ex   Gait     Neuro Re-ed     Modalities 15 min Pt laid on MH for LB during table ex   E-Stim     Dry Needling     Canalith Repositioning       Home Exercises Provided and Patient Education Provided     Education provided:   -Plan of care, HEP    Assessment     Continued POC this date. Performed bilaterally lumbar gapping this date. Added treadmill on slight incline at end of session which pt tolerated well. Pt continues to tolerate more activities and challenge with minimal complaints.     Patient prognosis is Good.      Anticipated barriers to physical therapy: previous history of non-compliance with PT     Goals: Shu Is progressing well towards her goals.    Short Term Goals: 6 weeks   Patient will report at least 10% disability reduction from initial MDQ score to indicate clinically significant functional improvement MET   Patient will report at least 10 point increase on initial FOTO score to indicate clinically significant  functional improvement MET   Patient will 50% reduction in pain. MET   Pt will stand and ambulate with minimal antalgic compensation. MET      Long Term Goals: 12 weeks   Patient will report at least 20% disability reduction from initial MDQ score to indicate clinically significant functional improvement PROGRESSING   Patient will report at least 20 point increase on initial FOTO score to indicate clinically significant functional improvement PROGRESSING   Patient will demonstrate normal lumbar spine and hip AROM. PROGRESSING  Pt will demonstrate at least 4+/5 BLE MMT. PROGRESSING   Pt will report 0-1/10 pain. PROGRESSING     Plan     Continue POC focused on improving lumbopelvic AROM and stability     3 x/week x 6 weeks    Dilan Xie, PT

## 2024-12-18 ENCOUNTER — CLINICAL SUPPORT (OUTPATIENT)
Dept: REHABILITATION | Facility: HOSPITAL | Age: 54
End: 2024-12-18
Payer: MEDICAID

## 2024-12-18 DIAGNOSIS — M54.16 RADICULOPATHY, LUMBAR REGION: Primary | ICD-10-CM

## 2024-12-18 PROCEDURE — 97140 MANUAL THERAPY 1/> REGIONS: CPT

## 2024-12-18 PROCEDURE — 97110 THERAPEUTIC EXERCISES: CPT

## 2024-12-18 PROCEDURE — 97010 HOT OR COLD PACKS THERAPY: CPT

## 2024-12-18 PROCEDURE — 97530 THERAPEUTIC ACTIVITIES: CPT

## 2024-12-18 NOTE — PROGRESS NOTES
Physical Therapy Treatment Note     Name: Shu Mcclure  Clinic Number: [id number]    Therapy Diagnosis:   Encounter Diagnosis   Name Primary?    Radiculopathy, lumbar region Yes       Physician: Kd Ocampo MD    Visit Date: 12/18/2024    Physician Orders: PT Eval and Treat  Medical Diagnosis from Referral: lumbar radiculopathy   Evaluation Date: 10/25/2024  Authorization Period Expiration: med nec   Plan of Care Expiration: 1/17/25  Visit # / Visits authorized: 22 / 36    Time In: 958  Time Out: 1100  Total Billable Time: 66 minutes    Subjective     Patient reports: soreness this date, but states she continues to do more at home.     CMS Impairment/Limitation/Restriction for FOTO Survey  Therapist reviewed FOTO scores for Shu Mcclure on 12/9/2024. FOTO documents entered into EPIC - see Media section.  FOTO filled out by: patient   Patient's Physical FS Primary Measure: 24 (predicted 34 by visit #13)  Risk Adjusted Statistical FOTO: 42  Category: Mobility  MDQ: 70% disability    Objective     EVALUATION (10/25/24) REASSESSMENT (11/11/24)   Posture/Appearance  Presents with back brace, a SPC, and a post op right knee brace      Left lateral shift (antalgic offload), mild rotational curve observed      Gait Analysis: antalgic     Posture/Appearance  Presents with back brace, a SPC, and a post op right knee brace      Mild Left lateral shift, mild rotational curve observed      Gait Analysis: antalgic (more erect with less compensation)   Palpation     R lumbar paraspinals TTP           Dermatomes     BLE WNL        Reflexes     Clonus: pos R ankle  Right: patellar: 1+ and Achilles: absent   Left: patellar: 2+ and Achilles: 2+       AROM     Flexion: severe loss*  Extension: severe loss*  Left rotation: min loss  Right rotation: min loss   Left lateral bending: min loss   Right lateral bending: min loss  AROM     Flexion: min loss  Extension: mod loss  Left rotation: WNL  Right rotation: WNL   Left  lateral bending: WNL  Right lateral bending: WNL    Myotomes     At least 3/5 gross BLE, not formally tested d/t pain complaints     Myotomes     At least 4/5 gross BLE   Hip/SI Clearance     MITCHELL: pos DELBERT (R LBP, L L hip pn)  FADDIR: pos DELBERT (R LBP, L L hip pn)  Hip IR ROM: 40 deg left, 35 deg right  Hip ER ROM: 40 deg left, 30* deg right     RLE downslip compared to LLE        Hip/SI Clearance     MITCHELL: pos DELBERT (R hip pn, L L hip pn)  FADDIR: pos DELBERT (R hip pn, L L hip pn)  Hip IR ROM: 40 deg left, 30 deg right  Hip ER ROM: 40 deg left, 30 deg right     RLE downslip compared to LLE (corrected w LLE distraction)      Special Tests     SLR: neg DELBERT   90/90 Hamstring: pos DELBERT         Shu received the following treatment:     Time Activities   Manual 10 min  Prone: STM to DELBERT lumbar paraspinals   S/L: lumbar gapping gr V bilaterally   Supine: RLE long-axis distraction    TherAct 25 min Functional tasks    TherEx 25 min ROM and strengthening ex   Gait     Neuro Re-ed     Modalities 15 min Pt laid on MH for LB during table ex   E-Stim     Dry Needling     Canalith Repositioning       Home Exercises Provided and Patient Education Provided     Education provided:   -Plan of care, HEP    Assessment     Continued POC this date. Pt with minimal c/o lower back pain and soreness throughout.     Patient prognosis is Good.      Anticipated barriers to physical therapy: previous history of non-compliance with PT     Goals: Shu Is progressing well towards her goals.    Short Term Goals: 6 weeks   Patient will report at least 10% disability reduction from initial MDQ score to indicate clinically significant functional improvement MET   Patient will report at least 10 point increase on initial FOTO score to indicate clinically significant functional improvement MET   Patient will 50% reduction in pain. MET   Pt will stand and ambulate with minimal antalgic compensation. MET      Long Term Goals: 12 weeks   Patient will  report at least 20% disability reduction from initial MDQ score to indicate clinically significant functional improvement PROGRESSING   Patient will report at least 20 point increase on initial FOTO score to indicate clinically significant functional improvement PROGRESSING   Patient will demonstrate normal lumbar spine and hip AROM. PROGRESSING  Pt will demonstrate at least 4+/5 BLE MMT. PROGRESSING   Pt will report 0-1/10 pain. PROGRESSING     Plan     Continue POC focused on improving lumbopelvic AROM and stability     3 x/week x 6 weeks    Dilan Xie, PT

## 2024-12-20 ENCOUNTER — CLINICAL SUPPORT (OUTPATIENT)
Dept: REHABILITATION | Facility: HOSPITAL | Age: 54
End: 2024-12-20
Payer: MEDICAID

## 2024-12-20 DIAGNOSIS — M54.16 RADICULOPATHY, LUMBAR REGION: Primary | ICD-10-CM

## 2024-12-20 PROCEDURE — 97010 HOT OR COLD PACKS THERAPY: CPT

## 2024-12-20 PROCEDURE — 97110 THERAPEUTIC EXERCISES: CPT

## 2024-12-20 PROCEDURE — 97530 THERAPEUTIC ACTIVITIES: CPT

## 2024-12-20 PROCEDURE — 97140 MANUAL THERAPY 1/> REGIONS: CPT

## 2024-12-20 NOTE — PROGRESS NOTES
Physical Therapy Treatment Note     Name: Shu Mcclure  Clinic Number: [id number]    Therapy Diagnosis:   Encounter Diagnosis   Name Primary?    Radiculopathy, lumbar region Yes       Physician: Kd Ocampo MD    Visit Date: 12/20/2024    Physician Orders: PT Eval and Treat  Medical Diagnosis from Referral: lumbar radiculopathy   Evaluation Date: 10/25/2024  Authorization Period Expiration: med nec   Plan of Care Expiration: 1/17/25  Visit # / Visits authorized: 23 / 36    Time In: 1000  Time Out: 1100  Total Billable Time: 60 minutes    Subjective     Patient reports: the cold weather is likely causing some increased stiffness this morning.     CMS Impairment/Limitation/Restriction for FOTO Survey  Therapist reviewed FOTO scores for Shu Mcclure on 12/9/2024. FOTO documents entered into EPIC - see Media section.  FOTO filled out by: patient   Patient's Physical FS Primary Measure: 24 (predicted 34 by visit #13)  Risk Adjusted Statistical FOTO: 42  Category: Mobility  MDQ: 70% disability    Objective     EVALUATION (10/25/24) REASSESSMENT (11/11/24)   Posture/Appearance  Presents with back brace, a SPC, and a post op right knee brace      Left lateral shift (antalgic offload), mild rotational curve observed      Gait Analysis: antalgic     Posture/Appearance  Presents with back brace, a SPC, and a post op right knee brace      Mild Left lateral shift, mild rotational curve observed      Gait Analysis: antalgic (more erect with less compensation)   Palpation     R lumbar paraspinals TTP           Dermatomes     BLE WNL        Reflexes     Clonus: pos R ankle  Right: patellar: 1+ and Achilles: absent   Left: patellar: 2+ and Achilles: 2+       AROM     Flexion: severe loss*  Extension: severe loss*  Left rotation: min loss  Right rotation: min loss   Left lateral bending: min loss   Right lateral bending: min loss  AROM     Flexion: min loss  Extension: mod loss  Left rotation: WNL  Right rotation:  WNL   Left lateral bending: WNL  Right lateral bending: WNL    Myotomes     At least 3/5 gross BLE, not formally tested d/t pain complaints     Myotomes     At least 4/5 gross BLE   Hip/SI Clearance     MITCHELL: pos DELBERT (R LBP, L L hip pn)  FADDIR: pos DELBERT (R LBP, L L hip pn)  Hip IR ROM: 40 deg left, 35 deg right  Hip ER ROM: 40 deg left, 30* deg right     RLE downslip compared to LLE        Hip/SI Clearance     MITCHELL: pos DELBERT (R hip pn, L L hip pn)  FADDIR: pos DELBERT (R hip pn, L L hip pn)  Hip IR ROM: 40 deg left, 30 deg right  Hip ER ROM: 40 deg left, 30 deg right     RLE downslip compared to LLE (corrected w LLE distraction)      Special Tests     SLR: neg DELBERT   90/90 Hamstring: pos DELBERT         Shu received the following treatment:     Time Activities   Manual 10 min  Prone: STM to DELBERT lumbar paraspinals   S/L: lumbar gapping gr V bilaterally   Supine: RLE long-axis distraction    TherAct 25 min Functional tasks    TherEx 25 min ROM and strengthening ex   Gait     Neuro Re-ed     Modalities 15 min Pt laid on MH for LB during table ex   E-Stim     Dry Needling     Canalith Repositioning       Home Exercises Provided and Patient Education Provided     Education provided:   -Plan of care, HEP    Assessment     Continued POC this date. Pt with minimal c/o lower back pain and soreness throughout. Progressed resistance for hip exercises with mod fatigue.     Patient prognosis is Good.      Anticipated barriers to physical therapy: previous history of non-compliance with PT     Goals: Shu Is progressing well towards her goals.    Short Term Goals: 6 weeks   Patient will report at least 10% disability reduction from initial MDQ score to indicate clinically significant functional improvement MET   Patient will report at least 10 point increase on initial FOTO score to indicate clinically significant functional improvement MET   Patient will 50% reduction in pain. MET   Pt will stand and ambulate with minimal  antalgic compensation. MET      Long Term Goals: 12 weeks   Patient will report at least 20% disability reduction from initial MDQ score to indicate clinically significant functional improvement PROGRESSING   Patient will report at least 20 point increase on initial FOTO score to indicate clinically significant functional improvement PROGRESSING   Patient will demonstrate normal lumbar spine and hip AROM. PROGRESSING  Pt will demonstrate at least 4+/5 BLE MMT. PROGRESSING   Pt will report 0-1/10 pain. PROGRESSING     Plan     Continue POC focused on improving lumbopelvic AROM and stability     3 x/week x 6 weeks    Dilan Xie, PT

## 2024-12-23 ENCOUNTER — CLINICAL SUPPORT (OUTPATIENT)
Dept: REHABILITATION | Facility: HOSPITAL | Age: 54
End: 2024-12-23
Payer: MEDICAID

## 2024-12-23 DIAGNOSIS — M54.16 RADICULOPATHY, LUMBAR REGION: Primary | ICD-10-CM

## 2024-12-23 PROCEDURE — 97140 MANUAL THERAPY 1/> REGIONS: CPT

## 2024-12-23 PROCEDURE — 97530 THERAPEUTIC ACTIVITIES: CPT

## 2024-12-23 PROCEDURE — 97110 THERAPEUTIC EXERCISES: CPT

## 2024-12-23 NOTE — PROGRESS NOTES
Physical Therapy Treatment Note / Progress Note     Name: Shu Mcclure  Clinic Number: [id number]    Therapy Diagnosis:   Encounter Diagnosis   Name Primary?    Radiculopathy, lumbar region Yes       Physician: Kd Ocampo MD    Visit Date: 12/23/2024    Physician Orders: PT Eval and Treat  Medical Diagnosis from Referral: lumbar radiculopathy   Evaluation Date: 10/25/2024  Authorization Period Expiration: med nec   Plan of Care Expiration: 1/17/25  Visit # / Visits authorized: 24 / 36    Time In: 1000  Time Out: 1110  Total Billable Time: 70 minutes    Subjective     Patient reports: the cold weather is likely causing some increased stiffness this morning.     CMS Impairment/Limitation/Restriction for FOTO Survey  Therapist reviewed FOTO scores for Shu Mcclure on 12/23/2024. FOTO documents entered into [employer] - see Media section.  FOTO filled out by: patient   Patient's Physical FS Primary Measure: 49 (predicted 34 by visit #13)  Risk Adjusted Statistical FOTO: 42  Category: Mobility  MDQ: 34% disability    Objective     EVALUATION (10/25/24) REASSESSMENT (11/11/24) REASSESSMENT (12/23/24)   Posture/Appearance  Presents with back brace, a SPC, and a post op right knee brace      Left lateral shift (antalgic offload), mild rotational curve observed      Gait Analysis: antalgic     Posture/Appearance  Presents with back brace, a SPC, and a post op right knee brace      Mild Left lateral shift, mild rotational curve observed      Gait Analysis: antalgic (more erect with less compensation)    Palpation     R lumbar paraspinals TTP            Dermatomes     BLE WNL         Reflexes     Clonus: pos R ankle  Right: patellar: 1+ and Achilles: absent   Left: patellar: 2+ and Achilles: 2+        AROM     Flexion: severe loss*  Extension: severe loss*  Left rotation: min loss  Right rotation: min loss   Left lateral bending: min loss   Right lateral bending: min loss  AROM     Flexion: min loss  Extension: mod  loss  Left rotation: WNL  Right rotation: WNL   Left lateral bending: WNL  Right lateral bending: WNL  AROM     Flexion: min loss ([place]'s sign)  Extension: mod loss  Left rotation: WNL  Right rotation: WNL   Left lateral bending: WNL  Right lateral bending: WNL    Myotomes     At least 3/5 gross BLE, not formally tested d/t pain complaints     Myotomes     At least 4/5 gross BLE Myotomes     Gross RLE 4/5  Gross LLE 4+/5   Hip/SI Clearance     MITCHELL: pos DELBERT (R LBP, L L hip pn)  FADDIR: pos DELBERT (R LBP, L L hip pn)  Hip IR ROM: 40 deg left, 35 deg right  Hip ER ROM: 40 deg left, 30* deg right     RLE downslip compared to LLE        Hip/SI Clearance     MITCHELL: pos DELBERT (R hip pn, L L hip pn)  FADDIR: pos DELBERT (R hip pn, L L hip pn)  Hip IR ROM: 40 deg left, 30 deg right  Hip ER ROM: 40 deg left, 30 deg right     RLE downslip compared to LLE (corrected w LLE distraction)    Hip/SI Clearance       Hip IR ROM: 40 deg left, 32 deg right  Hip ER ROM: 40 deg left, 35 deg right   Special Tests     SLR: neg DELBERT   90/90 Hamstring: pos DELBERT          Shu received the following treatment:     Time Activities   Manual 9 min  Prone: STM to DELBERT lumbar paraspinals / multifidus   HELD lumbar gapping   Prone: RLE long-axis distraction    TherAct 38 min Functional tasks / reassessment    TherEx 23 min ROM and strengthening ex   Gait     Neuro Re-ed     Modalities 15 min Pt laid on MH for LB during table ex   E-Stim     Dry Needling     Canalith Repositioning       Home Exercises Provided and Patient Education Provided     Education provided:   -Plan of care, HEP    Assessment     Pt continues to demonstrate and report progress toward her set goals. Pt would benefit from continuing her current POC to address present deficits and improve overall functional status.     Patient prognosis is Good.      Anticipated barriers to physical therapy: previous history of non-compliance with PT     Goals: Shu Is progressing well towards her  goals.    Short Term Goals: 6 weeks   Patient will report at least 10% disability reduction from initial MDQ score to indicate clinically significant functional improvement MET   Patient will report at least 10 point increase on initial FOTO score to indicate clinically significant functional improvement MET   Patient will 50% reduction in pain. MET   Pt will stand and ambulate with minimal antalgic compensation. MET      Long Term Goals: 12 weeks   Patient will report at least 20% disability reduction from initial MDQ score to indicate clinically significant functional improvement MET   Patient will report at least 20 point increase on initial FOTO score to indicate clinically significant functional improvement MET   Patient will demonstrate normal lumbar spine and hip AROM. PROGRESSING  Pt will demonstrate at least 4+/5 BLE MMT. PROGRESSING   Pt will report 0-1/10 pain. PROGRESSING     Plan     Continue POC focused on improving lumbopelvic AROM and stability     3 x/week x 4 weeks    Dilan Xie, PT

## 2024-12-27 ENCOUNTER — CLINICAL SUPPORT (OUTPATIENT)
Dept: REHABILITATION | Facility: HOSPITAL | Age: 54
End: 2024-12-27
Payer: MEDICAID

## 2024-12-27 DIAGNOSIS — M54.16 RADICULOPATHY, LUMBAR REGION: Primary | ICD-10-CM

## 2024-12-27 PROCEDURE — 97010 HOT OR COLD PACKS THERAPY: CPT

## 2024-12-27 PROCEDURE — 97110 THERAPEUTIC EXERCISES: CPT

## 2024-12-27 PROCEDURE — 97530 THERAPEUTIC ACTIVITIES: CPT

## 2024-12-27 PROCEDURE — 97140 MANUAL THERAPY 1/> REGIONS: CPT

## 2024-12-27 NOTE — PROGRESS NOTES
Physical Therapy Treatment Note / Progress Note     Name: Shu Mcclure  Clinic Number: [id number]    Therapy Diagnosis:   Encounter Diagnosis   Name Primary?    Radiculopathy, lumbar region Yes       Physician: Kd Ocampo MD    Visit Date: 12/27/2024    Physician Orders: PT Eval and Treat  Medical Diagnosis from Referral: lumbar radiculopathy   Evaluation Date: 10/25/2024  Authorization Period Expiration: med [facility]   Plan of Care Expiration: 1/17/25  Visit # / Visits authorized: 25 / 36    Time In: 955  Time Out: 1105  Total Billable Time: 70 minutes    Subjective     Patient reports: her back is doing okay this morning. Notes one of her close friends passed away a couple of days ago.      CMS Impairment/Limitation/Restriction for FOTO Survey  Therapist reviewed FOTO scores for Shu Mcclure on 12/23/2024. FOTO documents entered into [employer] - see Media section.  FOTO filled out by: patient   Patient's Physical FS Primary Measure: 49 (predicted 34 by visit #13)  Risk Adjusted Statistical FOTO: 42  Category: Mobility  MDQ: 34% disability    Objective     EVALUATION (10/25/24) REASSESSMENT (11/11/24) REASSESSMENT (12/23/24)   Posture/Appearance  Presents with back brace, a SPC, and a post op right knee brace      Left lateral shift (antalgic offload), mild rotational curve observed      Gait Analysis: antalgic     Posture/Appearance  Presents with back brace, a SPC, and a post op right knee brace      Mild Left lateral shift, mild rotational curve observed      Gait Analysis: antalgic (more erect with less compensation)    Palpation     R lumbar paraspinals TTP            Dermatomes     BLE WNL         Reflexes     Clonus: pos R ankle  Right: patellar: 1+ and Achilles: absent   Left: patellar: 2+ and Achilles: 2+        AROM     Flexion: severe loss*  Extension: severe loss*  Left rotation: min loss  Right rotation: min loss   Left lateral bending: min loss   Right lateral bending: min loss  AROM      Flexion: min loss  Extension: mod loss  Left rotation: WNL  Right rotation: WNL   Left lateral bending: WNL  Right lateral bending: WNL  AROM     Flexion: min loss (Jeanette's sign)  Extension: mod loss  Left rotation: WNL  Right rotation: WNL   Left lateral bending: WNL  Right lateral bending: WNL    Myotomes     At least 3/5 gross BLE, not formally tested d/t pain complaints     Myotomes     At least 4/5 gross BLE Myotomes     Gross RLE 4/5  Gross LLE 4+/5   Hip/SI Clearance     MITCHELL: pos DELBERT (R LBP, L L hip pn)  FADDIR: pos DELBERT (R LBP, L L hip pn)  Hip IR ROM: 40 deg left, 35 deg right  Hip ER ROM: 40 deg left, 30* deg right     RLE downslip compared to LLE        Hip/SI Clearance     MITCHELL: pos DELBERT (R hip pn, L L hip pn)  FADDIR: pos DELBERT (R hip pn, L L hip pn)  Hip IR ROM: 40 deg left, 30 deg right  Hip ER ROM: 40 deg left, 30 deg right     RLE downslip compared to LLE (corrected w LLE distraction)    Hip/SI Clearance       Hip IR ROM: 40 deg left, 32 deg right  Hip ER ROM: 40 deg left, 35 deg right   Special Tests     SLR: neg DELBERT   90/90 Hamstring: pos DELBERT          Shu received the following treatment:     Time Activities   Manual 9 min  Prone: STM to DELBERT lumbar paraspinals / multifidus   supine: RLE long-axis distraction gr V   TherAct 38 min Functional tasks    TherEx 23 min ROM and strengthening ex   Gait     Neuro Re-ed     Modalities 15 min Pt laid on MH for LB during table ex   E-Stim     Dry Needling     Canalith Repositioning       Home Exercises Provided and Patient Education Provided     Education provided:   -Plan of care, HEP    Assessment     Continued POC this date. Pt without complaints of pain, but with min reports of fatigue. Pt tolerated mild progression of squats and treadmill activities.     Patient prognosis is Good.      Anticipated barriers to physical therapy: previous history of non-compliance with PT     Goals: Shu Is progressing well towards her goals.    Short Term Goals:  6 weeks   Patient will report at least 10% disability reduction from initial MDQ score to indicate clinically significant functional improvement MET   Patient will report at least 10 point increase on initial FOTO score to indicate clinically significant functional improvement MET   Patient will 50% reduction in pain. MET   Pt will stand and ambulate with minimal antalgic compensation. MET      Long Term Goals: 12 weeks   Patient will report at least 20% disability reduction from initial MDQ score to indicate clinically significant functional improvement MET   Patient will report at least 20 point increase on initial FOTO score to indicate clinically significant functional improvement MET   Patient will demonstrate normal lumbar spine and hip AROM. PROGRESSING  Pt will demonstrate at least 4+/5 BLE MMT. PROGRESSING   Pt will report 0-1/10 pain. PROGRESSING     Plan     Continue POC focused on improving lumbopelvic AROM and stability     3 x/week x 4 weeks    Dilan Xie, PT

## 2024-12-30 ENCOUNTER — CLINICAL SUPPORT (OUTPATIENT)
Dept: REHABILITATION | Facility: HOSPITAL | Age: 54
End: 2024-12-30
Payer: MEDICAID

## 2024-12-30 ENCOUNTER — HOSPITAL ENCOUNTER (EMERGENCY)
Facility: HOSPITAL | Age: 54
Discharge: HOME | End: 2024-12-30
Attending: FAMILY MEDICINE
Payer: MEDICAID

## 2024-12-30 VITALS
WEIGHT: 136 LBS | HEIGHT: 66 IN | TEMPERATURE: 98 F | BODY MASS INDEX: 21.86 KG/M2 | HEART RATE: 70 BPM | OXYGEN SATURATION: 100 % | SYSTOLIC BLOOD PRESSURE: 145 MMHG | RESPIRATION RATE: 20 BRPM | DIASTOLIC BLOOD PRESSURE: 90 MMHG

## 2024-12-30 DIAGNOSIS — R10.9 ABDOMINAL PAIN: ICD-10-CM

## 2024-12-30 DIAGNOSIS — K58.9 IRRITABLE BOWEL SYNDROME, UNSPECIFIED TYPE: Primary | ICD-10-CM

## 2024-12-30 DIAGNOSIS — M54.16 RADICULOPATHY, LUMBAR REGION: Primary | ICD-10-CM

## 2024-12-30 LAB
ALBUMIN SERPL-MCNC: 4.5 G/DL (ref 3.5–5)
ALBUMIN/GLOB SERPL: 1.2 RATIO (ref 1.1–2)
ALP SERPL-CCNC: 105 UNIT/L (ref 40–150)
ALT SERPL-CCNC: 16 UNIT/L (ref 0–55)
ANION GAP SERPL CALC-SCNC: 9 MEQ/L
AST SERPL-CCNC: 21 UNIT/L (ref 5–34)
BACTERIA #/AREA URNS AUTO: ABNORMAL /HPF
BASOPHILS # BLD AUTO: 0.04 X10(3)/MCL
BASOPHILS NFR BLD AUTO: 0.6 %
BILIRUB SERPL-MCNC: 0.8 MG/DL
BILIRUB UR QL STRIP.AUTO: ABNORMAL
BUN SERPL-MCNC: 12 MG/DL (ref 9.8–20.1)
CALCIUM SERPL-MCNC: 9.9 MG/DL (ref 8.4–10.2)
CHLORIDE SERPL-SCNC: 106 MMOL/L (ref 98–107)
CLARITY UR: CLEAR
CO2 SERPL-SCNC: 27 MMOL/L (ref 22–29)
COLOR UR AUTO: YELLOW
CREAT SERPL-MCNC: 0.71 MG/DL (ref 0.55–1.02)
CREAT/UREA NIT SERPL: 17
EOSINOPHIL # BLD AUTO: 0.12 X10(3)/MCL (ref 0–0.9)
EOSINOPHIL NFR BLD AUTO: 1.8 %
ERYTHROCYTE [DISTWIDTH] IN BLOOD BY AUTOMATED COUNT: 12.8 % (ref 11.5–17)
GFR SERPLBLD CREATININE-BSD FMLA CKD-EPI: >60 ML/MIN/1.73/M2
GLOBULIN SER-MCNC: 3.9 GM/DL (ref 2.4–3.5)
GLUCOSE SERPL-MCNC: 100 MG/DL (ref 74–100)
GLUCOSE UR QL STRIP: NEGATIVE
HCT VFR BLD AUTO: 39.6 % (ref 37–47)
HGB BLD-MCNC: 13.6 G/DL (ref 12–16)
HGB UR QL STRIP: ABNORMAL
IMM GRANULOCYTES # BLD AUTO: 0.01 X10(3)/MCL (ref 0–0.04)
IMM GRANULOCYTES NFR BLD AUTO: 0.2 %
KETONES UR QL STRIP: NEGATIVE
LEUKOCYTE ESTERASE UR QL STRIP: NEGATIVE
LIPASE SERPL-CCNC: 108 U/L
LYMPHOCYTES # BLD AUTO: 2.01 X10(3)/MCL (ref 0.6–4.6)
LYMPHOCYTES NFR BLD AUTO: 31 %
MCH RBC QN AUTO: 31.8 PG (ref 27–31)
MCHC RBC AUTO-ENTMCNC: 34.3 G/DL (ref 33–36)
MCV RBC AUTO: 92.5 FL (ref 80–94)
MONOCYTES # BLD AUTO: 0.69 X10(3)/MCL (ref 0.1–1.3)
MONOCYTES NFR BLD AUTO: 10.6 %
MUCOUS THREADS URNS QL MICRO: ABNORMAL /LPF
NEUTROPHILS # BLD AUTO: 3.62 X10(3)/MCL (ref 2.1–9.2)
NEUTROPHILS NFR BLD AUTO: 55.8 %
NITRITE UR QL STRIP: NEGATIVE
NRBC BLD AUTO-RTO: 0 %
PH UR STRIP: 5.5 [PH]
PLATELET # BLD AUTO: 327 X10(3)/MCL (ref 130–400)
PMV BLD AUTO: 8.2 FL (ref 7.4–10.4)
POTASSIUM SERPL-SCNC: 3.8 MMOL/L (ref 3.5–5.1)
PROT SERPL-MCNC: 8.4 GM/DL (ref 6.4–8.3)
PROT UR QL STRIP: ABNORMAL
RBC # BLD AUTO: 4.28 X10(6)/MCL (ref 4.2–5.4)
RBC #/AREA URNS AUTO: ABNORMAL /HPF
SODIUM SERPL-SCNC: 142 MMOL/L (ref 136–145)
SP GR UR STRIP.AUTO: >=1.03 (ref 1–1.03)
SQUAMOUS #/AREA URNS AUTO: ABNORMAL /HPF
UROBILINOGEN UR STRIP-ACNC: 1
WBC # BLD AUTO: 6.49 X10(3)/MCL (ref 4.5–11.5)
WBC #/AREA URNS AUTO: ABNORMAL /HPF

## 2024-12-30 PROCEDURE — 99284 EMERGENCY DEPT VISIT MOD MDM: CPT | Mod: 25

## 2024-12-30 PROCEDURE — 97110 THERAPEUTIC EXERCISES: CPT

## 2024-12-30 PROCEDURE — 83690 ASSAY OF LIPASE: CPT | Performed by: FAMILY MEDICINE

## 2024-12-30 PROCEDURE — 85025 COMPLETE CBC W/AUTO DIFF WBC: CPT | Performed by: FAMILY MEDICINE

## 2024-12-30 PROCEDURE — 97010 HOT OR COLD PACKS THERAPY: CPT

## 2024-12-30 PROCEDURE — 97140 MANUAL THERAPY 1/> REGIONS: CPT

## 2024-12-30 PROCEDURE — 97530 THERAPEUTIC ACTIVITIES: CPT

## 2024-12-30 PROCEDURE — 80053 COMPREHEN METABOLIC PANEL: CPT | Performed by: FAMILY MEDICINE

## 2024-12-30 PROCEDURE — 96372 THER/PROPH/DIAG INJ SC/IM: CPT | Performed by: FAMILY MEDICINE

## 2024-12-30 PROCEDURE — 81003 URINALYSIS AUTO W/O SCOPE: CPT | Performed by: FAMILY MEDICINE

## 2024-12-30 PROCEDURE — 63600175 PHARM REV CODE 636 W HCPCS: Performed by: FAMILY MEDICINE

## 2024-12-30 RX ORDER — KETOROLAC TROMETHAMINE 30 MG/ML
60 INJECTION, SOLUTION INTRAMUSCULAR; INTRAVENOUS
Status: COMPLETED | OUTPATIENT
Start: 2024-12-30 | End: 2024-12-30

## 2024-12-30 RX ORDER — DICYCLOMINE HYDROCHLORIDE 20 MG/1
20 TABLET ORAL
Qty: 20 TABLET | Refills: 0 | Status: SHIPPED | OUTPATIENT
Start: 2024-12-30

## 2024-12-30 RX ADMIN — KETOROLAC TROMETHAMINE 60 MG: 30 INJECTION, SOLUTION INTRAMUSCULAR at 12:12

## 2024-12-30 NOTE — ED PROVIDER NOTES
Encounter Date: 12/30/2024       History     Chief Complaint   Patient presents with    Abdominal Pain    Diarrhea     Pt with hx of IBS, C/o ABD cramps x2 days. No BM today.      Pt here with abdominal cramping for past 2d. Pt told a few months ago that she may have IBS. Meds not working. Intermittent constipation and diarrhea. No f/c, no n/v. Good appetite.    The history is provided by the patient.     Review of patient's allergies indicates:  No Known Allergies  Past Medical History:   Diagnosis Date    GERD (gastroesophageal reflux disease)     Hypertension     IBS (irritable bowel syndrome)      Past Surgical History:   Procedure Laterality Date    CHOLECYSTECTOMY      HYSTERECTOMY      knee scope       No family history on file.  Social History     Tobacco Use    Smoking status: Never    Smokeless tobacco: Never   Substance Use Topics    Alcohol use: Never    Drug use: Never     Review of Systems   Constitutional:  Negative for fever.   HENT:  Negative for sore throat.    Respiratory:  Negative for shortness of breath.    Cardiovascular:  Negative for chest pain.   Gastrointestinal:  Positive for abdominal pain, constipation, diarrhea and nausea.   Genitourinary:  Negative for dysuria.   Musculoskeletal:  Negative for back pain.   Skin:  Negative for rash.   Neurological:  Negative for weakness.   Hematological:  Does not bruise/bleed easily.   All other systems reviewed and are negative.      Physical Exam     Initial Vitals [12/30/24 1112]   BP Pulse Resp Temp SpO2   (!) 158/100 68 20 98.2 °F (36.8 °C) 100 %      MAP       --         Physical Exam    Nursing note and vitals reviewed.  Constitutional: She appears well-developed and well-nourished.   Pt calm, in NAD, alert and oriented x4   HENT:   Head: Normocephalic and atraumatic.   Eyes: EOM are normal. Pupils are equal, round, and reactive to light.   Neck: Neck supple.   Normal range of motion.  Cardiovascular:  Normal rate, regular rhythm and normal  heart sounds.           Pulmonary/Chest: Breath sounds normal. No respiratory distress. She has no wheezes.   Abdominal: Abdomen is soft. Bowel sounds are normal. She exhibits no distension and no mass. There is abdominal tenderness (mild diffusely tender). There is no rebound and no guarding.   Musculoskeletal:         General: No edema. Normal range of motion.      Cervical back: Normal range of motion and neck supple.     Neurological: She is alert and oriented to person, place, and time.   Skin: Skin is warm and dry. Capillary refill takes less than 2 seconds.   Psychiatric: She has a normal mood and affect.         ED Course   Procedures  Labs Reviewed   URINALYSIS, REFLEX TO URINE CULTURE - Abnormal       Result Value    Color, UA Yellow      Appearance, UA Clear      Specific Gravity, UA >=1.030      pH, UA 5.5      Protein, UA 1+ (*)     Glucose, UA Negative      Ketones, UA Negative      Blood, UA 2+ (*)     Bilirubin, UA 1+ (*)     Urobilinogen, UA 1.0      Nitrites, UA Negative      Leukocyte Esterase, UA Negative     URINALYSIS, MICROSCOPIC - Abnormal    Bacteria, UA Few (*)     Mucous, UA Moderate (*)     RBC, UA 0-5      WBC, UA None Seen      Squamous Epithelial Cells, UA Few (*)    COMPREHENSIVE METABOLIC PANEL - Abnormal    Sodium 142      Potassium 3.8      Chloride 106      CO2 27      Glucose 100      Blood Urea Nitrogen 12.0      Creatinine 0.71      Calcium 9.9      Protein Total 8.4 (*)     Albumin 4.5      Globulin 3.9 (*)     Albumin/Globulin Ratio 1.2      Bilirubin Total 0.8            ALT 16      AST 21      eGFR >60      Anion Gap 9.0      BUN/Creatinine Ratio 17     CBC WITH DIFFERENTIAL - Abnormal    WBC 6.49      RBC 4.28      Hgb 13.6      Hct 39.6      MCV 92.5      MCH 31.8 (*)     MCHC 34.3      RDW 12.8      Platelet 327      MPV 8.2      Neut % 55.8      Lymph % 31.0      Mono % 10.6      Eos % 1.8      Basophil % 0.6      Lymph # 2.01      Neut # 3.62      Mono # 0.69       Eos # 0.12      Baso # 0.04      IG# 0.01      IG% 0.2      NRBC% 0.0     CBC W/ AUTO DIFFERENTIAL    Narrative:     The following orders were created for panel order CBC Auto Differential.  Procedure                               Abnormality         Status                     ---------                               -----------         ------                     CBC with Differential[[id number]]       Abnormal            Final result                 Please view results for these tests on the individual orders.   LIPASE          Imaging Results              X-Ray Abdomen Flat And Erect (Preliminary result)  Result time 12/30/24 12:59:32      Wet Read by Cezar Veras MD (12/30/24 12:59:32, [facility] - Emergency Dept, Emergency Medicine)    No acute, normal bg pattern                                     Medications   ketorolac injection 60 mg (60 mg Intramuscular Given 12/30/24 1230)     Medical Decision Making  Pt's symptoms c/w IBS. Workup negative for acute issue, but it would be a good idea to get a colonoscopy in the future.    Amount and/or Complexity of Data Reviewed  Labs: ordered.     Details: CBC, CMP unremarkable.   Radiology: ordered and independent interpretation performed.     Details: Normal BG    Risk  Prescription drug management.                                      Clinical Impression:  Final diagnoses:  [R10.9] Abdominal pain  [K58.9] Irritable bowel syndrome, unspecified type (Primary)          ED Disposition Condition    Discharge Stable          ED Prescriptions       Medication Sig Dispense Start Date End Date Auth. Provider    dicyclomine (BENTYL) 20 mg tablet Take 1 tablet (20 mg total) by mouth every 6 to 8 hours as needed (abdominal cramping). 20 tablet 12/30/2024 -- Cezar Veras MD          Follow-up Information       Follow up With Specialties Details Why Contact Info    Kd Ocampo MD Family Medicine Schedule an appointment as soon as possible for a visit    [address] [place] [address]  [phone]               Cezar Veras MD  12/30/24 3400

## 2024-12-30 NOTE — ED NOTES
Room available at this time and patient brought back to room.  Patient ambulated to room 5 with steady gait.  Pt has a chronic condition of IBS and has intermittent episodes of abd pain and diarrhea.  Today her pain is a 10/10 on pain scale.

## 2024-12-30 NOTE — DISCHARGE INSTRUCTIONS
Consider colonoscopy as symptoms persist.    OK to take immodium 2-3 times a day as needed for diarrhea.    Stop hycosamine, replacing this with dicyclomine.

## 2024-12-30 NOTE — PROGRESS NOTES
"  Physical Therapy Treatment Note      Name: Shu Mcclure  Clinic Number: [id number]    Therapy Diagnosis:   Encounter Diagnosis   Name Primary?    Radiculopathy, lumbar region Yes       Physician: Kd Ocampo MD    Visit Date: 12/30/2024    Physician Orders: PT Eval and Treat  Medical Diagnosis from Referral: lumbar radiculopathy   Evaluation Date: 10/25/2024  Authorization Period Expiration: med nec   Plan of Care Expiration: 1/17/25  Visit # / Visits authorized: 26 / 36    Time In: 1005  Time Out: 1100  Total Billable Time: 55 minutes    Subjective     Patient reports: her stomach is hurting "pretty bad" this morning causing her back to ache more than usual. Pt states her back did well over the weekend. Pt presents to PT ambulating without cane.     CMS Impairment/Limitation/Restriction for FOTO Survey  Therapist reviewed FOTO scores for Shu Mcclure on 12/23/2024. FOTO documents entered into [employer] - see Media section.  FOTO filled out by: patient   Patient's Physical FS Primary Measure: 49 (predicted 34 by visit #13)  Risk Adjusted Statistical FOTO: 42  Category: Mobility  MDQ: 34% disability    Objective     EVALUATION (10/25/24) REASSESSMENT (11/11/24) REASSESSMENT (12/23/24)   Posture/Appearance  Presents with back brace, a SPC, and a post op right knee brace      Left lateral shift (antalgic offload), mild rotational curve observed      Gait Analysis: antalgic     Posture/Appearance  Presents with back brace, a SPC, and a post op right knee brace      Mild Left lateral shift, mild rotational curve observed      Gait Analysis: antalgic (more erect with less compensation)    Palpation     R lumbar paraspinals TTP            Dermatomes     BLE WNL         Reflexes     Clonus: pos R ankle  Right: patellar: 1+ and Achilles: absent   Left: patellar: 2+ and Achilles: 2+        AROM     Flexion: severe loss*  Extension: severe loss*  Left rotation: min loss  Right rotation: min loss   Left lateral " bending: min loss   Right lateral bending: min loss  AROM     Flexion: min loss  Extension: mod loss  Left rotation: WNL  Right rotation: WNL   Left lateral bending: WNL  Right lateral bending: WNL  AROM     Flexion: min loss ([place]'s sign)  Extension: mod loss  Left rotation: WNL  Right rotation: WNL   Left lateral bending: WNL  Right lateral bending: WNL    Myotomes     At least 3/5 gross BLE, not formally tested d/t pain complaints     Myotomes     At least 4/5 gross BLE Myotomes     Gross RLE 4/5  Gross LLE 4+/5   Hip/SI Clearance     MITCHELL: pos DELBERT (R LBP, L L hip pn)  FADDIR: pos DELBERT (R LBP, L L hip pn)  Hip IR ROM: 40 deg left, 35 deg right  Hip ER ROM: 40 deg left, 30* deg right     RLE downslip compared to LLE        Hip/SI Clearance     MITCHELL: pos DELBERT (R hip pn, L L hip pn)  FADDIR: pos DELBERT (R hip pn, L L hip pn)  Hip IR ROM: 40 deg left, 30 deg right  Hip ER ROM: 40 deg left, 30 deg right     RLE downslip compared to LLE (corrected w LLE distraction)    Hip/SI Clearance       Hip IR ROM: 40 deg left, 32 deg right  Hip ER ROM: 40 deg left, 35 deg right   Special Tests     SLR: neg DELBERT   90/90 Hamstring: pos DELBERT          Shu received the following treatment:     Time Activities   Manual 10 min  Prone: STM to DELBERT lumbar paraspinals / multifidus      TherAct 25 min Functional tasks    TherEx 15 min ROM and strengthening ex   Gait     Neuro Re-ed     Modalities 15 min Pt laid on MH for LB during table ex   E-Stim     Dry Needling     Canalith Repositioning       Home Exercises Provided and Patient Education Provided     Education provided:   -Plan of care, HEP    Assessment     Continued POC this date. Pt performed less repetitions of some exercises d/t stomach complaints.      Patient prognosis is Good.      Anticipated barriers to physical therapy: previous history of non-compliance with PT     Goals: Shu Is progressing well towards her goals.    Short Term Goals: 6 weeks   Patient will report at  least 10% disability reduction from initial MDQ score to indicate clinically significant functional improvement MET   Patient will report at least 10 point increase on initial FOTO score to indicate clinically significant functional improvement MET   Patient will 50% reduction in pain. MET   Pt will stand and ambulate with minimal antalgic compensation. MET      Long Term Goals: 12 weeks   Patient will report at least 20% disability reduction from initial MDQ score to indicate clinically significant functional improvement MET   Patient will report at least 20 point increase on initial FOTO score to indicate clinically significant functional improvement MET   Patient will demonstrate normal lumbar spine and hip AROM. PROGRESSING  Pt will demonstrate at least 4+/5 BLE MMT. PROGRESSING   Pt will report 0-1/10 pain. PROGRESSING     Plan     Continue POC focused on improving lumbopelvic AROM and stability     3 x/week x 4 weeks    Dilan Xie, PT

## 2025-01-03 ENCOUNTER — CLINICAL SUPPORT (OUTPATIENT)
Dept: REHABILITATION | Facility: HOSPITAL | Age: 55
End: 2025-01-03
Payer: MEDICAID

## 2025-01-03 DIAGNOSIS — M54.16 RADICULOPATHY, LUMBAR REGION: Primary | ICD-10-CM

## 2025-01-03 PROCEDURE — 97140 MANUAL THERAPY 1/> REGIONS: CPT

## 2025-01-03 PROCEDURE — 97530 THERAPEUTIC ACTIVITIES: CPT

## 2025-01-03 PROCEDURE — 97010 HOT OR COLD PACKS THERAPY: CPT

## 2025-01-03 PROCEDURE — 97110 THERAPEUTIC EXERCISES: CPT

## 2025-01-03 NOTE — PROGRESS NOTES
Physical Therapy Treatment Note      Name: Shu Mcclure  Clinic Number: [id number]    Therapy Diagnosis:   Encounter Diagnosis   Name Primary?    Radiculopathy, lumbar region Yes       Physician: Kd Ocampo MD    Visit Date: 1/3/2025    Physician Orders: PT Eval and Treat  Medical Diagnosis from Referral: lumbar radiculopathy   Evaluation Date: 10/25/2024  Authorization Period Expiration: [facility]   Plan of Care Expiration: 1/17/25  Visit # / Visits authorized: 27 / 36    Time In: 1000  Time Out: 1100  Total Billable Time: 60 minutes    Subjective     Patient reports: her stomach is feeling better. Notes her back is doing okay today.     CMS Impairment/Limitation/Restriction for FOTO Survey  Therapist reviewed FOTO scores for Shu Mcclure on 12/23/2024. FOTO documents entered into [employer] - see Media section.  FOTO filled out by: patient   Patient's Physical FS Primary Measure: 49 (predicted 34 by visit #13)  Risk Adjusted Statistical FOTO: 42  Category: Mobility  MDQ: 34% disability    Objective     EVALUATION (10/25/24) REASSESSMENT (11/11/24) REASSESSMENT (12/23/24)   Posture/Appearance  Presents with back brace, a SPC, and a post op right knee brace      Left lateral shift (antalgic offload), mild rotational curve observed      Gait Analysis: antalgic     Posture/Appearance  Presents with back brace, a SPC, and a post op right knee brace      Mild Left lateral shift, mild rotational curve observed      Gait Analysis: antalgic (more erect with less compensation)    Palpation     R lumbar paraspinals TTP            Dermatomes     BLE WNL         Reflexes     Clonus: pos R ankle  Right: patellar: 1+ and Achilles: absent   Left: patellar: 2+ and Achilles: 2+        AROM     Flexion: severe loss*  Extension: severe loss*  Left rotation: min loss  Right rotation: min loss   Left lateral bending: min loss   Right lateral bending: min loss  AROM     Flexion: min loss  Extension: mod loss  Left rotation:  WNL  Right rotation: WNL   Left lateral bending: WNL  Right lateral bending: WNL  AROM     Flexion: min loss (Jeanette's sign)  Extension: mod loss  Left rotation: WNL  Right rotation: WNL   Left lateral bending: WNL  Right lateral bending: WNL    Myotomes     At least 3/5 gross BLE, not formally tested d/t pain complaints     Myotomes     At least 4/5 gross BLE Myotomes     Gross RLE 4/5  Gross LLE 4+/5   Hip/SI Clearance     MITCHELL: pos DELBERT (R LBP, L L hip pn)  FADDIR: pos DELBERT (R LBP, L L hip pn)  Hip IR ROM: 40 deg left, 35 deg right  Hip ER ROM: 40 deg left, 30* deg right     RLE downslip compared to LLE        Hip/SI Clearance     MITCHELL: pos DELBERT (R hip pn, L L hip pn)  FADDIR: pos DELBERT (R hip pn, L L hip pn)  Hip IR ROM: 40 deg left, 30 deg right  Hip ER ROM: 40 deg left, 30 deg right     RLE downslip compared to LLE (corrected w LLE distraction)    Hip/SI Clearance       Hip IR ROM: 40 deg left, 32 deg right  Hip ER ROM: 40 deg left, 35 deg right   Special Tests     SLR: neg DELBERT   90/90 Hamstring: pos DELBERT          Shu received the following treatment:     Time Activities   Manual 10 min  Prone: STM to DELBERT lumbar paraspinals / multifidus      TherAct 25 min Functional tasks    TherEx 25 min ROM and strengthening ex   Gait     Neuro Re-ed     Modalities 15 min Pt laid on MH for LB during table ex   E-Stim     Dry Needling     Canalith Repositioning       Home Exercises Provided and Patient Education Provided     Education provided:   -Plan of care, HEP    Assessment     Continued POC this date. Pt able to return to full POC with minimal complaints.       Patient prognosis is Good.      Anticipated barriers to physical therapy: previous history of non-compliance with PT     Goals: Shu Is progressing well towards her goals.    Short Term Goals: 6 weeks   Patient will report at least 10% disability reduction from initial MDQ score to indicate clinically significant functional improvement MET   Patient will  report at least 10 point increase on initial FOTO score to indicate clinically significant functional improvement MET   Patient will 50% reduction in pain. MET   Pt will stand and ambulate with minimal antalgic compensation. MET      Long Term Goals: 12 weeks   Patient will report at least 20% disability reduction from initial MDQ score to indicate clinically significant functional improvement MET   Patient will report at least 20 point increase on initial FOTO score to indicate clinically significant functional improvement MET   Patient will demonstrate normal lumbar spine and hip AROM. PROGRESSING  Pt will demonstrate at least 4+/5 BLE MMT. PROGRESSING   Pt will report 0-1/10 pain. PROGRESSING     Plan     Continue POC focused on improving lumbopelvic AROM and stability     3 x/week x 4 weeks    Dilan Xie, PT

## 2025-01-06 ENCOUNTER — CLINICAL SUPPORT (OUTPATIENT)
Dept: REHABILITATION | Facility: HOSPITAL | Age: 55
End: 2025-01-06
Payer: MEDICAID

## 2025-01-06 DIAGNOSIS — M54.16 RADICULOPATHY, LUMBAR REGION: Primary | ICD-10-CM

## 2025-01-06 PROCEDURE — 97530 THERAPEUTIC ACTIVITIES: CPT

## 2025-01-06 PROCEDURE — 97110 THERAPEUTIC EXERCISES: CPT

## 2025-01-06 PROCEDURE — 97010 HOT OR COLD PACKS THERAPY: CPT

## 2025-01-10 ENCOUNTER — CLINICAL SUPPORT (OUTPATIENT)
Dept: REHABILITATION | Facility: HOSPITAL | Age: 55
End: 2025-01-10
Payer: MEDICAID

## 2025-01-10 DIAGNOSIS — M54.16 RADICULOPATHY, LUMBAR REGION: Primary | ICD-10-CM

## 2025-01-10 PROCEDURE — 97010 HOT OR COLD PACKS THERAPY: CPT

## 2025-01-10 PROCEDURE — 97110 THERAPEUTIC EXERCISES: CPT

## 2025-01-10 PROCEDURE — 97530 THERAPEUTIC ACTIVITIES: CPT

## 2025-01-10 NOTE — PROGRESS NOTES
Physical Therapy Treatment Note      Name: Shu Mcclure  Clinic Number: [id number]    Therapy Diagnosis:   Encounter Diagnosis   Name Primary?    Radiculopathy, lumbar region Yes       Physician: Kd Ocampo MD    Visit Date: 1/10/2025    Physician Orders: PT Eval and Treat  Medical Diagnosis from Referral: lumbar radiculopathy   Evaluation Date: 10/25/2024  Authorization Period Expiration: [facility]   Plan of Care Expiration: 1/17/25  Visit # / Visits authorized: 29 / 36    Time In: 955  Time Out: 1050  Total Billable Time: 55 minutes    Subjective     Patient reports: her back did well after the progressions introduced last session. Pt states she has been approved for knee surgery and is only awaiting a date.     CMS Impairment/Limitation/Restriction for FOTO Survey  Therapist reviewed FOTO scores for Shu Mcclure on 12/23/2024. FOTO documents entered into [employer] - see Media section.  FOTO filled out by: patient   Patient's Physical FS Primary Measure: 49 (predicted 34 by visit #13)  Risk Adjusted Statistical FOTO: 42  Category: Mobility  MDQ: 34% disability    Objective     EVALUATION (10/25/24) REASSESSMENT (11/11/24) REASSESSMENT (12/23/24)   Posture/Appearance  Presents with back brace, a SPC, and a post op right knee brace      Left lateral shift (antalgic offload), mild rotational curve observed      Gait Analysis: antalgic     Posture/Appearance  Presents with back brace, a SPC, and a post op right knee brace      Mild Left lateral shift, mild rotational curve observed      Gait Analysis: antalgic (more erect with less compensation)    Palpation     R lumbar paraspinals TTP            Dermatomes     BLE WNL         Reflexes     Clonus: pos R ankle  Right: patellar: 1+ and Achilles: absent   Left: patellar: 2+ and Achilles: 2+        AROM     Flexion: severe loss*  Extension: severe loss*  Left rotation: min loss  Right rotation: min loss   Left lateral bending: min loss   Right lateral bending:  min loss  AROM     Flexion: min loss  Extension: mod loss  Left rotation: WNL  Right rotation: WNL   Left lateral bending: WNL  Right lateral bending: WNL  AROM     Flexion: min loss ([place]'s sign)  Extension: mod loss  Left rotation: WNL  Right rotation: WNL   Left lateral bending: WNL  Right lateral bending: WNL    Myotomes     At least 3/5 gross BLE, not formally tested d/t pain complaints     Myotomes     At least 4/5 gross BLE Myotomes     Gross RLE 4/5  Gross LLE 4+/5   Hip/SI Clearance     MITCHELL: pos DELBERT (R LBP, L L hip pn)  FADDIR: pos DELBERT (R LBP, L L hip pn)  Hip IR ROM: 40 deg left, 35 deg right  Hip ER ROM: 40 deg left, 30* deg right     RLE downslip compared to LLE        Hip/SI Clearance     MITCHELL: pos DELBERT (R hip pn, L L hip pn)  FADDIR: pos DELBERT (R hip pn, L L hip pn)  Hip IR ROM: 40 deg left, 30 deg right  Hip ER ROM: 40 deg left, 30 deg right     RLE downslip compared to LLE (corrected w LLE distraction)    Hip/SI Clearance       Hip IR ROM: 40 deg left, 32 deg right  Hip ER ROM: 40 deg left, 35 deg right   Special Tests     SLR: neg DELBERT   90/90 Hamstring: pos DELBERT          Shu received the following treatment:     Time Activities   Manual Held  Prone: STM to DELBERT lumbar paraspinals / multifidus      TherAct 30 min Functional tasks    TherEx 25 min ROM and strengthening ex   Gait     Neuro Re-ed     Modalities 15 min Pt laid on MH for LB during table ex   E-Stim     Dry Needling     Canalith Repositioning       Home Exercises Provided and Patient Education Provided     Education provided:   -Plan of care, HEP    Assessment     Continued POC. Pt with minimal complaints throughout. Pt tolerated all standing tasks well again this date.    Patient prognosis is Good.      Anticipated barriers to physical therapy: previous history of non-compliance with PT     Goals: Shu Is progressing well towards her goals.    Short Term Goals: 6 weeks   Patient will report at least 10% disability reduction from  initial MDQ score to indicate clinically significant functional improvement MET   Patient will report at least 10 point increase on initial FOTO score to indicate clinically significant functional improvement MET   Patient will 50% reduction in pain. MET   Pt will stand and ambulate with minimal antalgic compensation. MET      Long Term Goals: 12 weeks   Patient will report at least 20% disability reduction from initial MDQ score to indicate clinically significant functional improvement MET   Patient will report at least 20 point increase on initial FOTO score to indicate clinically significant functional improvement MET   Patient will demonstrate normal lumbar spine and hip AROM. PROGRESSING  Pt will demonstrate at least 4+/5 BLE MMT. PROGRESSING   Pt will report 0-1/10 pain. PROGRESSING     Plan     Continue POC focused on improving lumbopelvic AROM and stability     3 x/week x 4 weeks    Dilan Xie, PT

## 2025-01-13 ENCOUNTER — CLINICAL SUPPORT (OUTPATIENT)
Dept: REHABILITATION | Facility: HOSPITAL | Age: 55
End: 2025-01-13
Payer: MEDICAID

## 2025-01-13 DIAGNOSIS — M54.16 RADICULOPATHY, LUMBAR REGION: Primary | ICD-10-CM

## 2025-01-13 PROCEDURE — 97530 THERAPEUTIC ACTIVITIES: CPT

## 2025-01-13 PROCEDURE — 97010 HOT OR COLD PACKS THERAPY: CPT

## 2025-01-13 PROCEDURE — 97110 THERAPEUTIC EXERCISES: CPT

## 2025-01-13 NOTE — PROGRESS NOTES
Physical Therapy Treatment Note      Name: Shu Mcclure  Clinic Number: [id number]    Therapy Diagnosis:   Encounter Diagnosis   Name Primary?    Radiculopathy, lumbar region Yes       Physician: Kd Ocampo MD    Visit Date: 1/13/2025    Physician Orders: PT Eval and Treat  Medical Diagnosis from Referral: lumbar radiculopathy   Evaluation Date: 10/25/2024  Authorization Period Expiration: med [facility]   Plan of Care Expiration: 1/17/25  Visit # / Visits authorized: 30 / 36    Time In: 1000  Time Out: 1100  Total Billable Time: 60 minutes    Subjective     Patient reports: she tripped and fell onto her left knee while at home over the weekend. Notes she is hurting all over this morning.     CMS Impairment/Limitation/Restriction for FOTO Survey  Therapist reviewed FOTO scores for Shu Mcclure on 12/23/2024. FOTO documents entered into EPIC - see Media section.  FOTO filled out by: patient   Patient's Physical FS Primary Measure: 49 (predicted 34 by visit #13)  Risk Adjusted Statistical FOTO: 42  Category: Mobility  MDQ: 34% disability    Objective     EVALUATION (10/25/24) REASSESSMENT (11/11/24) REASSESSMENT (12/23/24)   Posture/Appearance  Presents with back brace, a SPC, and a post op right knee brace      Left lateral shift (antalgic offload), mild rotational curve observed      Gait Analysis: antalgic     Posture/Appearance  Presents with back brace, a SPC, and a post op right knee brace      Mild Left lateral shift, mild rotational curve observed      Gait Analysis: antalgic (more erect with less compensation)    Palpation     R lumbar paraspinals TTP            Dermatomes     BLE WNL         Reflexes     Clonus: pos R ankle  Right: patellar: 1+ and Achilles: absent   Left: patellar: 2+ and Achilles: 2+        AROM     Flexion: severe loss*  Extension: severe loss*  Left rotation: min loss  Right rotation: min loss   Left lateral bending: min loss   Right lateral bending: min loss  AROM      Flexion: min loss  Extension: mod loss  Left rotation: WNL  Right rotation: WNL   Left lateral bending: WNL  Right lateral bending: WNL  AROM     Flexion: min loss (Jeanette's sign)  Extension: mod loss  Left rotation: WNL  Right rotation: WNL   Left lateral bending: WNL  Right lateral bending: WNL    Myotomes     At least 3/5 gross BLE, not formally tested d/t pain complaints     Myotomes     At least 4/5 gross BLE Myotomes     Gross RLE 4/5  Gross LLE 4+/5   Hip/SI Clearance     MITCHELL: pos DELBERT (R LBP, L L hip pn)  FADDIR: pos DELBERT (R LBP, L L hip pn)  Hip IR ROM: 40 deg left, 35 deg right  Hip ER ROM: 40 deg left, 30* deg right     RLE downslip compared to LLE        Hip/SI Clearance     MITCHELL: pos DEBLERT (R hip pn, L L hip pn)  FADDIR: pos DELBERT (R hip pn, L L hip pn)  Hip IR ROM: 40 deg left, 30 deg right  Hip ER ROM: 40 deg left, 30 deg right     RLE downslip compared to LLE (corrected w LLE distraction)    Hip/SI Clearance       Hip IR ROM: 40 deg left, 32 deg right  Hip ER ROM: 40 deg left, 35 deg right   Special Tests     SLR: neg DELBERT   90/90 Hamstring: pos DELBERT          Shu received the following treatment:     Time Activities   Manual Held  Prone: STM to DELBERT lumbar paraspinals / multifidus      TherAct 30 min Functional tasks    TherEx 30 min ROM and strengthening ex   Gait     Neuro Re-ed     Modalities 15 min Pt laid on MH for LB during table ex   E-Stim     Dry Needling     Canalith Repositioning       Home Exercises Provided and Patient Education Provided     Education provided:   -Plan of care, HEP    Assessment     Continued POC. Pt with minimal complaints throughout. Pt moved a little slower this morning d/t some hip and knee soreness related to her fall.     Patient prognosis is Good.      Anticipated barriers to physical therapy: previous history of non-compliance with PT     Goals: Shu Is progressing well towards her goals.    Short Term Goals: 6 weeks   Patient will report at least 10%  disability reduction from initial MDQ score to indicate clinically significant functional improvement MET   Patient will report at least 10 point increase on initial FOTO score to indicate clinically significant functional improvement MET   Patient will 50% reduction in pain. MET   Pt will stand and ambulate with minimal antalgic compensation. MET      Long Term Goals: 12 weeks   Patient will report at least 20% disability reduction from initial MDQ score to indicate clinically significant functional improvement MET   Patient will report at least 20 point increase on initial FOTO score to indicate clinically significant functional improvement MET   Patient will demonstrate normal lumbar spine and hip AROM. PROGRESSING  Pt will demonstrate at least 4+/5 BLE MMT. PROGRESSING   Pt will report 0-1/10 pain. PROGRESSING     Plan     Continue POC focused on improving lumbopelvic AROM and stability     3 x/week x 4 weeks    Dilan Xie, PT

## 2025-01-15 ENCOUNTER — CLINICAL SUPPORT (OUTPATIENT)
Dept: REHABILITATION | Facility: HOSPITAL | Age: 55
End: 2025-01-15
Payer: MEDICAID

## 2025-01-15 DIAGNOSIS — M54.16 RADICULOPATHY, LUMBAR REGION: Primary | ICD-10-CM

## 2025-01-15 PROCEDURE — 97010 HOT OR COLD PACKS THERAPY: CPT

## 2025-01-15 PROCEDURE — 97530 THERAPEUTIC ACTIVITIES: CPT

## 2025-01-15 PROCEDURE — 97110 THERAPEUTIC EXERCISES: CPT

## 2025-01-15 NOTE — PROGRESS NOTES
Physical Therapy Treatment Note      Name: Shu Mcclure  Clinic Number: [id number]    Therapy Diagnosis:   Encounter Diagnosis   Name Primary?    Radiculopathy, lumbar region Yes       Physician: Kd Ocampo MD    Visit Date: 1/15/2025    Physician Orders: PT Eval and Treat  Medical Diagnosis from Referral: lumbar radiculopathy   Evaluation Date: 10/25/2024  Authorization Period Expiration: [facility]   Plan of Care Expiration: 1/17/25  Visit # / Visits authorized: 31 / 36    Time In: 956  Time Out: 1056  Total Billable Time: 60 minutes    Subjective     Patient reports: her left leg has been aching over the last 2 days but only when she lays down.     CMS Impairment/Limitation/Restriction for FOTO Survey  Therapist reviewed FOTO scores for Shu Mcclure on 12/23/2024. FOTO documents entered into [employer] - see Media section.  FOTO filled out by: patient   Patient's Physical FS Primary Measure: 49 (predicted 34 by visit #13)  Risk Adjusted Statistical FOTO: 42  Category: Mobility  MDQ: 34% disability    Objective     EVALUATION (10/25/24) REASSESSMENT (11/11/24) REASSESSMENT (12/23/24)   Posture/Appearance  Presents with back brace, a SPC, and a post op right knee brace      Left lateral shift (antalgic offload), mild rotational curve observed      Gait Analysis: antalgic     Posture/Appearance  Presents with back brace, a SPC, and a post op right knee brace      Mild Left lateral shift, mild rotational curve observed      Gait Analysis: antalgic (more erect with less compensation)    Palpation     R lumbar paraspinals TTP            Dermatomes     BLE WNL         Reflexes     Clonus: pos R ankle  Right: patellar: 1+ and Achilles: absent   Left: patellar: 2+ and Achilles: 2+        AROM     Flexion: severe loss*  Extension: severe loss*  Left rotation: min loss  Right rotation: min loss   Left lateral bending: min loss   Right lateral bending: min loss  AROM     Flexion: min loss  Extension: mod loss  Left  rotation: WNL  Right rotation: WNL   Left lateral bending: WNL  Right lateral bending: WNL  AROM     Flexion: min loss ([place]'s sign)  Extension: mod loss  Left rotation: WNL  Right rotation: WNL   Left lateral bending: WNL  Right lateral bending: WNL    Myotomes     At least 3/5 gross BLE, not formally tested d/t pain complaints     Myotomes     At least 4/5 gross BLE Myotomes     Gross RLE 4/5  Gross LLE 4+/5   Hip/SI Clearance     MITCHELL: pos DELBERT (R LBP, L L hip pn)  FADDIR: pos DELBERT (R LBP, L L hip pn)  Hip IR ROM: 40 deg left, 35 deg right  Hip ER ROM: 40 deg left, 30* deg right     RLE downslip compared to LLE        Hip/SI Clearance     MITCHELL: pos DELBERT (R hip pn, L L hip pn)  FADDIR: pos DELBERT (R hip pn, L L hip pn)  Hip IR ROM: 40 deg left, 30 deg right  Hip ER ROM: 40 deg left, 30 deg right     RLE downslip compared to LLE (corrected w LLE distraction)    Hip/SI Clearance       Hip IR ROM: 40 deg left, 32 deg right  Hip ER ROM: 40 deg left, 35 deg right   Special Tests     SLR: neg DELBERT   90/90 Hamstring: pos DELBERT          Shu received the following treatment:     Time Activities   Manual Held  Prone: STM to DELBERT lumbar paraspinals / multifidus      TherAct 30 min Functional tasks    TherEx 30 min ROM and strengthening ex   Gait     Neuro Re-ed     Modalities 15 min Pt laid on MH for LB during table ex   E-Stim     Dry Needling     Canalith Repositioning       Home Exercises Provided and Patient Education Provided     Education provided:   -Plan of care, HEP    Assessment     Continued POC. Pt with minimal complaints throughout. Tolerated session well.      Patient prognosis is Good.      Anticipated barriers to physical therapy: previous history of non-compliance with PT     Goals: Shu Is progressing well towards her goals.    Short Term Goals: 6 weeks   Patient will report at least 10% disability reduction from initial MDQ score to indicate clinically significant functional improvement MET   Patient  will report at least 10 point increase on initial FOTO score to indicate clinically significant functional improvement MET   Patient will 50% reduction in pain. MET   Pt will stand and ambulate with minimal antalgic compensation. MET      Long Term Goals: 12 weeks   Patient will report at least 20% disability reduction from initial MDQ score to indicate clinically significant functional improvement MET   Patient will report at least 20 point increase on initial FOTO score to indicate clinically significant functional improvement MET   Patient will demonstrate normal lumbar spine and hip AROM. PROGRESSING  Pt will demonstrate at least 4+/5 BLE MMT. PROGRESSING   Pt will report 0-1/10 pain. PROGRESSING     Plan     Continue POC focused on improving lumbopelvic AROM and stability     3 x/week x 4 weeks    Dilan Xie, PT

## 2025-01-17 ENCOUNTER — CLINICAL SUPPORT (OUTPATIENT)
Dept: REHABILITATION | Facility: HOSPITAL | Age: 55
End: 2025-01-17
Payer: MEDICAID

## 2025-01-17 DIAGNOSIS — M54.16 RADICULOPATHY, LUMBAR REGION: Primary | ICD-10-CM

## 2025-01-17 PROCEDURE — 97110 THERAPEUTIC EXERCISES: CPT

## 2025-01-17 PROCEDURE — 97530 THERAPEUTIC ACTIVITIES: CPT

## 2025-01-17 PROCEDURE — 97010 HOT OR COLD PACKS THERAPY: CPT

## 2025-01-17 NOTE — PROGRESS NOTES
Physical Therapy Treatment Note      Name: Shu Mcclure  Clinic Number: [id number]    Therapy Diagnosis:   Encounter Diagnosis   Name Primary?    Radiculopathy, lumbar region Yes       Physician: Kd Ocampo MD    Visit Date: 1/17/2025    Physician Orders: PT Eval and Treat  Medical Diagnosis from Referral: lumbar radiculopathy   Evaluation Date: 10/25/2024  Authorization Period Expiration: med [facility]   Plan of Care Expiration: 1/17/25  Visit # / Visits authorized: 32 / 36    Time In: 1305  Time Out: 1400  Total Billable Time: 55 minutes    Subjective     Patient reports: everything is aching today.     CMS Impairment/Limitation/Restriction for FOTO Survey  Therapist reviewed FOTO scores for Shu Mcclure on 12/23/2024. FOTO documents entered into EPIC - see Media section.  FOTO filled out by: patient   Patient's Physical FS Primary Measure: 49 (predicted 34 by visit #13)  Risk Adjusted Statistical FOTO: 42  Category: Mobility  MDQ: 34% disability    Objective     EVALUATION (10/25/24) REASSESSMENT (11/11/24) REASSESSMENT (12/23/24)   Posture/Appearance  Presents with back brace, a SPC, and a post op right knee brace      Left lateral shift (antalgic offload), mild rotational curve observed      Gait Analysis: antalgic     Posture/Appearance  Presents with back brace, a SPC, and a post op right knee brace      Mild Left lateral shift, mild rotational curve observed      Gait Analysis: antalgic (more erect with less compensation)    Palpation     R lumbar paraspinals TTP            Dermatomes     BLE WNL         Reflexes     Clonus: pos R ankle  Right: patellar: 1+ and Achilles: absent   Left: patellar: 2+ and Achilles: 2+        AROM     Flexion: severe loss*  Extension: severe loss*  Left rotation: min loss  Right rotation: min loss   Left lateral bending: min loss   Right lateral bending: min loss  AROM     Flexion: min loss  Extension: mod loss  Left rotation: WNL  Right rotation: WNL   Left lateral  bending: WNL  Right lateral bending: WNL  AROM     Flexion: min loss ([place]'s sign)  Extension: mod loss  Left rotation: WNL  Right rotation: WNL   Left lateral bending: WNL  Right lateral bending: WNL    Myotomes     At least 3/5 gross BLE, not formally tested d/t pain complaints     Myotomes     At least 4/5 gross BLE Myotomes     Gross RLE 4/5  Gross LLE 4+/5   Hip/SI Clearance     MITCHELL: pos DELBERT (R LBP, L L hip pn)  FADDIR: pos DELBERT (R LBP, L L hip pn)  Hip IR ROM: 40 deg left, 35 deg right  Hip ER ROM: 40 deg left, 30* deg right     RLE downslip compared to LLE        Hip/SI Clearance     MITCHELL: pos DELBERT (R hip pn, L L hip pn)  FADDIR: pos DLEBERT (R hip pn, L L hip pn)  Hip IR ROM: 40 deg left, 30 deg right  Hip ER ROM: 40 deg left, 30 deg right     RLE downslip compared to LLE (corrected w LLE distraction)    Hip/SI Clearance       Hip IR ROM: 40 deg left, 32 deg right  Hip ER ROM: 40 deg left, 35 deg right   Special Tests     SLR: neg DELBERT   90/90 Hamstring: pos DELBERT          Shu received the following treatment:     Time Activities   Manual Held  Prone: STM to DELBERT lumbar paraspinals / multifidus      TherAct 25 min Functional tasks    TherEx 30 min ROM and strengthening ex   Gait     Neuro Re-ed     Modalities 15 min Pt laid on MH for LB during table ex   E-Stim     Dry Needling     Canalith Repositioning       Home Exercises Provided and Patient Education Provided     Education provided:   -Plan of care, HEP    Assessment     Continued POC. Pt with minimal complaints throughout. Tolerated session well.      Patient prognosis is Good.      Anticipated barriers to physical therapy: previous history of non-compliance with PT     Goals: Shu Is progressing well towards her goals.    Short Term Goals: 6 weeks   Patient will report at least 10% disability reduction from initial MDQ score to indicate clinically significant functional improvement MET   Patient will report at least 10 point increase on initial  FOTO score to indicate clinically significant functional improvement MET   Patient will 50% reduction in pain. MET   Pt will stand and ambulate with minimal antalgic compensation. MET      Long Term Goals: 12 weeks   Patient will report at least 20% disability reduction from initial MDQ score to indicate clinically significant functional improvement MET   Patient will report at least 20 point increase on initial FOTO score to indicate clinically significant functional improvement MET   Patient will demonstrate normal lumbar spine and hip AROM. PROGRESSING  Pt will demonstrate at least 4+/5 BLE MMT. PROGRESSING   Pt will report 0-1/10 pain. PROGRESSING     Plan     Continue POC focused on improving lumbopelvic AROM and stability   Plan to DC end of next week    Dilan Xie, PT

## 2025-01-24 ENCOUNTER — CLINICAL SUPPORT (OUTPATIENT)
Dept: REHABILITATION | Facility: HOSPITAL | Age: 55
End: 2025-01-24
Payer: MEDICAID

## 2025-01-24 DIAGNOSIS — M54.16 RADICULOPATHY, LUMBAR REGION: Primary | ICD-10-CM

## 2025-01-24 PROCEDURE — 97110 THERAPEUTIC EXERCISES: CPT

## 2025-01-24 PROCEDURE — 97530 THERAPEUTIC ACTIVITIES: CPT

## 2025-01-24 PROCEDURE — 97010 HOT OR COLD PACKS THERAPY: CPT

## 2025-01-24 NOTE — PROGRESS NOTES
Physical Therapy Treatment Note / Discharge Note     Name: Shu Mcclure  Clinic Number: [id number]    Therapy Diagnosis:   Encounter Diagnosis   Name Primary?    Radiculopathy, lumbar region Yes       Physician: Kd Ocampo MD    Visit Date: 1/24/2025    Physician Orders: PT Eval and Treat  Medical Diagnosis from Referral: lumbar radiculopathy   Evaluation Date: 10/25/2024  Authorization Period Expiration: med [facility]   Plan of Care Expiration: 1/17/25  Visit # / Visits authorized: 33 / 36    Time In: 1007  Time Out: 1100  Total Billable Time: 53 minutes    Subjective     Patient reports: everything is hurting from the cold.     CMS Impairment/Limitation/Restriction for FOTO Survey  Therapist reviewed FOTO scores for Shu Mcclure on 12/23/2024. FOTO documents entered into EPIC - see Media section.  FOTO filled out by: patient   Patient's Physical FS Primary Measure: 49 (predicted 34 by visit #13)  Risk Adjusted Statistical FOTO: 42  Category: Mobility  MDQ: 34% disability    CMS Impairment/Limitation/Restriction for FOTO Survey  Therapist reviewed FOTO scores for Shu Mcclure on 1/24/25. FOTO documents entered into EPIC - see Media section.  FOTO filled out by: patient   Patient's Physical FS Primary Measure: 31 (predicted 34 by visit #13)  Risk Adjusted Statistical FOTO: 42  Category: Mobility  MDQ: 62% disability    Objective     EVALUATION (10/25/24) REASSESSMENT (11/11/24) REASSESSMENT (12/23/24) DISCHARGE (1/24/25)   Posture/Appearance  Presents with back brace, a SPC, and a post op right knee brace      Left lateral shift (antalgic offload), mild rotational curve observed      Gait Analysis: antalgic     Posture/Appearance  Presents with back brace, a SPC, and a post op right knee brace      Mild Left lateral shift, mild rotational curve observed      Gait Analysis: antalgic (more erect with less compensation)     Palpation     R lumbar paraspinals TTP             Dermatomes     BLE WNL           Reflexes     Clonus: pos R ankle  Right: patellar: 1+ and Achilles: absent   Left: patellar: 2+ and Achilles: 2+         AROM     Flexion: severe loss*  Extension: severe loss*  Left rotation: min loss  Right rotation: min loss   Left lateral bending: min loss   Right lateral bending: min loss  AROM     Flexion: min loss  Extension: mod loss  Left rotation: WNL  Right rotation: WNL   Left lateral bending: WNL  Right lateral bending: WNL  AROM     Flexion: min loss ([place]ers sign)  Extension: mod loss   Left rotation: WNL  Right rotation: WNL   Left lateral bending: WNL  Right lateral bending: WNL  AROM     Flexion: WNL  Extension: WNL  Left rotation: WNL  Right rotation: WNL   Left lateral bending: WNL  Right lateral bending: WNL    Myotomes     At least 3/5 gross BLE, not formally tested d/t pain complaints     Myotomes     At least 4/5 gross BLE Myotomes     Gross RLE 4+/5  Gross LLE 4/5 Myotomes     Gross RLE 4+/5  Gross LLE 4+/5   Hip/SI Clearance     MITCHELL: pos DELBERT (R LBP, L L hip pn)  FADDIR: pos DELBERT (R LBP, L L hip pn)  Hip IR ROM: 40 deg left, 35 deg right  Hip ER ROM: 40 deg left, 30* deg right     RLE downslip compared to LLE        Hip/SI Clearance     MITCHELL: pos DELBERT (R hip pn, L L hip pn)  FADDIR: pos DELBERT (R hip pn, L L hip pn)  Hip IR ROM: 40 deg left, 30 deg right  Hip ER ROM: 40 deg left, 30 deg right     RLE downslip compared to LLE (corrected w LLE distraction)    Hip/SI Clearance       Hip IR ROM: 40 deg left, 32 deg right  Hip ER ROM: 40 deg left, 35 deg right    Special Tests     SLR: neg DELBERT   90/90 Hamstring: pos DELBERT           Shu received the following treatment:     Time Activities   Manual Held  Prone: STM to DELBERT lumbar paraspinals / multifidus      TherAct 23 min Functional tasks + reassessment   TherEx 30 min ROM and strengthening ex   Gait     Neuro Re-ed     Modalities 15 min Pt laid on MH for LB during table ex   E-Stim     Dry Needling     Canalith Repositioning       Home  Exercises Provided and Patient Education Provided     Education provided:   -Plan of care, HEP    Assessment     Pt has met most of her set goals. Pt has plateaued with therapy at this time. Pt will be discharged from her lower back POC at this time. Pt has been encouraged to continue with her exercises.     Short Term Goals: 6 weeks   Patient will report at least 10% disability reduction from initial MDQ score to indicate clinically significant functional improvement MET   Patient will report at least 10 point increase on initial FOTO score to indicate clinically significant functional improvement MET   Patient will 50% reduction in pain. MET   Pt will stand and ambulate with minimal antalgic compensation. MET      Long Term Goals: 12 weeks   Patient will report at least 20% disability reduction from initial MDQ score to indicate clinically significant functional improvement MET   Patient will report at least 20 point increase on initial FOTO score to indicate clinically significant functional improvement MET   Patient will demonstrate normal lumbar spine and hip AROM. MET  Pt will demonstrate at least 4+/5 BLE MMT. MET   Pt will report 0-1/10 pain. NOT MET      Plan     Discharge from PT to HEP     Dilan Xie, PT

## 2025-02-10 ENCOUNTER — LAB VISIT (OUTPATIENT)
Dept: LAB | Facility: HOSPITAL | Age: 55
End: 2025-02-10
Attending: PHYSICIAN ASSISTANT
Payer: MEDICAID

## 2025-02-10 DIAGNOSIS — R19.7 DIARRHEA OF PRESUMED INFECTIOUS ORIGIN: Primary | ICD-10-CM

## 2025-02-10 LAB
C DIFF TOX A+B STL QL IA: NEGATIVE
CLOSTRIDIUM DIFFICILE GDH ANTIGEN (OHS): NEGATIVE
CRYPTOSP AG SPEC QL: NEGATIVE
FECAL LEUKOCYTE (OHS): NEGATIVE
G LAMBLIA AG STL QL IA: NEGATIVE

## 2025-02-10 PROCEDURE — 87337 ENTAMOEB HIST GROUP AG IA: CPT

## 2025-02-10 PROCEDURE — 86318 IA INFECTIOUS AGENT ANTIBODY: CPT

## 2025-02-10 PROCEDURE — 87328 CRYPTOSPORIDIUM AG IA: CPT

## 2025-02-10 PROCEDURE — 87427 SHIGA-LIKE TOXIN AG IA: CPT

## 2025-02-10 PROCEDURE — 89055 LEUKOCYTE ASSESSMENT FECAL: CPT

## 2025-02-12 LAB — BACTERIA STL CULT: NORMAL

## 2025-02-13 LAB — MAYO GENERIC ORDERABLE RESULT: NORMAL

## 2025-05-26 DIAGNOSIS — Z96.651 S/P TKR (TOTAL KNEE REPLACEMENT), RIGHT: Primary | ICD-10-CM

## 2025-05-27 ENCOUNTER — CLINICAL SUPPORT (OUTPATIENT)
Dept: REHABILITATION | Facility: HOSPITAL | Age: 55
End: 2025-05-27
Payer: MEDICAID

## 2025-05-27 DIAGNOSIS — Z96.651 S/P TKR (TOTAL KNEE REPLACEMENT), RIGHT: Primary | ICD-10-CM

## 2025-05-27 DIAGNOSIS — R26.89 ANTALGIC GAIT: ICD-10-CM

## 2025-05-27 DIAGNOSIS — M62.81 QUADRICEPS WEAKNESS: ICD-10-CM

## 2025-05-27 PROCEDURE — 97140 MANUAL THERAPY 1/> REGIONS: CPT

## 2025-05-27 PROCEDURE — 97014 ELECTRIC STIMULATION THERAPY: CPT

## 2025-05-27 PROCEDURE — 97110 THERAPEUTIC EXERCISES: CPT

## 2025-05-27 PROCEDURE — 97112 NEUROMUSCULAR REEDUCATION: CPT

## 2025-05-27 PROCEDURE — 97162 PT EVAL MOD COMPLEX 30 MIN: CPT

## 2025-05-27 NOTE — PROGRESS NOTES
Outpatient Rehab    Physical Therapy Evaluation    Patient Name: Shu Mcclure  MRN: [id number]  YOB: 1970  Encounter Date: 5/27/2025    Therapy Diagnosis:   Encounter Diagnoses   Name Primary?    S/P TKR (total knee replacement), right Yes    Quadriceps weakness     Antalgic gait      Physician: Hugh Oro MD    Physician Orders: Eval and Treat  Medical Diagnosis: S/P TKR (total knee replacement), right    Visit # / Visits Authorized:  1 / 1  Insurance Authorization Period: 5/26/2025 to 5/26/2026  Date of Evaluation: 5/27/2025  Plan of Care Certification: 5/27/2025 to 8/19/25     Time In: 1500   Time Out: 1600  Total Time (in minutes): 60     Intake Outcome Measure for FOTO Survey    Therapist reviewed FOTO scores for Shu Mcclure on 5/27/2025.   FOTO report - see Media section or FOTO account episode details.     Intake Score: 9%         Subjective   History of Present Illness  Shu is a 54 y.o. female who reports to physical therapy with a chief concern of right knee pain.     The patient reports a medical diagnosis of R knee OA.  Patient reports a surgery of R TKA. Surgery occurred on 04/25/25.         History of Present Condition/Illness: Pt reports to PT following R TKA d/t chronic knee pain. Pt received HHPT for about 13 days after the surgery, but has not had any therapy since and states that it is now going on another 2 weeks.     Activities of Daily Living  Social history was obtained from Patient.               Previously independent with activities of daily living? Yes     Currently independent with activities of daily living? Yes          Previously independent with instrumental activities of daily living? Yes     Currently independent with instrumental activities of daily living? Yes              Pain     Patient reports a current pain level of 10/10.  Pain at worst is reported as 10/10.   Clinical Progression (since onset): Stable         Review of Systems  Patient  reports: Osteoarthritis  Patient denies: Cardiac History and Diabetes  Additional Red Flag Details: HTN     Treatment History  Treatments  Discharged From Past 30 Days: Home health care  Previously Received Treatments: Yes  Previous Treatments: Physical therapy  Currently Receiving Treatments: No    Living Arrangements  Living Situation  Housing: Home independently  Living Arrangements: Children    Home Setup  Type of Structure: House  Home Access: Ramped entrance  Number of Levels in Home: One level        Employment  Employment Status: On disability          Past Medical History/Physical Systems Review:   Shu Mcclure  has a past medical history of GERD (gastroesophageal reflux disease), Hypertension, and IBS (irritable bowel syndrome).    Shu Mcclure  has a past surgical history that includes Hysterectomy; Cholecystectomy; and knee scope.    Shu has a current medication list which includes the following prescription(s): amlodipine, baclofen, clonidine, dicyclomine, escitalopram oxalate, hydrocodone-acetaminophen, hyoscyamine, ibuprofen, lorazepam, losartan, ondansetron, pantoprazole, tramadol, and trazodone.    Review of patient's allergies indicates:  No Known Allergies     Objective   Posture                 Right knee position is: Flexed       Knee Observations  Right Knee Observations  Present: Atrophy, Incision, and Straight Leg Raise Extensor Lag  Right Knee Incision Observations  Present: Clean and Dry  Not Present: Drainage            Knee Palpation     Severe TTP                 Knee Range of Motion   Right Knee   Active (deg) Passive (deg) Pain   Flexion 45 58 Yes   Extension -15 -11 Yes                      Knee Strength   Right Strength Right Pain Left Strength Left  Pain   Flexion (S2) 2         Prone Flexion           Extension (L3) 1           Knee Extensor Lag  Lag Present: Right           Knee Patellar Screening       Right Patellar Mobility  Hypomobile: Medial, Lateral, Superior,  and Inferior              Fall Risk  Functional mobility test results suggest the patient is: At Risk for Falls  Timed Up & Go (TUG)  Time: 34.2 seconds  Observations: Short strides and Shuffling  An older adult who takes >=12 seconds to complete the TUG is at risk for falling.    RW and hands required for STS       Gait Analysis  Gait Pattern: Antalgic  Walking Speed: Decreased    Right Side Walking Observations  Decreased: Step Length       Left Side Walking Observations  Decreased: Step Length     Pelvis Observations During Gait  Right: Hip Hike  Hip Observations During Gait  Right: Hip Circumducted  Knee Observations During Gait  Right: Decreased Knee Flexion in Swing and Decreased Knee Extension in Initial Contact  Ankle/Foot Observations During Gait  Right: Flat Foot Initial Contact  Gait Analysis Details  Ambulating w RW - pt able to improve gait mechanics with VC          Treatment:  Therapeutic Exercise  TE 1: seated knee ext stretch w 5# cuff for 15 min  TE 2: DKTC w SB 10 sec x 3 min  Manual Therapy  MT 1: supine: R patella mobs lat-med, inf-sup gr II; tib-fem mobs AP/PA gr II  Balance/Neuromuscular Re-Education  NMR 1: NMES 10:10 to R quad during EOB QS for 10 min  Modalities  Electrical Stimulation (Parameters): NMES 10:10 to R quad during EOB QS for 10 min    Time Entry(in minutes):  PT Evaluation (Moderate) Time Entry: 30  E-Stim (Unattended) Time Entry: 10  Manual Therapy Time Entry: 10  Neuromuscular Re-Education Time Entry: 10  Therapeutic Exercise Time Entry: 10    Assessment & Plan   Assessment  Shu presents with a condition of Moderate complexity.   Presentation of Symptoms: Stable  Will Comorbidities Impact Care: No       Functional Limitations: Transfers, Standing tolerance, Squatting, Range of motion, Painful locomotion/ambulation, Pain with ADLs/IADLs, Maintaining balance, Increased risk of fall, Gait limitations, Functional mobility, Decreased ambulation distance/endurance, Ambulating  on uneven surfaces, Activity tolerance  Impairments: Pain with functional activity, Lack of appropriate home exercise program, Impaired physical strength, Impaired balance, Activity intolerance, Abnormal or restricted range of motion, Abnormal gait, Abnormal muscle tone, Abnormal muscle firing    Patient Goal for Therapy (PT): to improve knee motion and function  Prognosis: Good  Assessment Details: Shu Mcclure is a 54 y.o. female referred to outpatient physical therapy s/p R TKA on 4/25/25. Pt presents with severe atrophy of right quadriceps femoris mm with severe loss of knee AROM and joint mobility. Pt also ambulating with severe antalgic compensation and is considered a high risk of falling based on her TUG score. Pt unable to perform active quad contraction even with assist from NMES this date. Pt will benefit from skilled outpatient physical therapy to address the deficits stated above, provide education, and to maximize the pt's level of functional independence.      Plan  From a physical therapy perspective, the patient would benefit from: Skilled Rehab Services    Planned therapy interventions include: Therapeutic exercise, Therapeutic activities, Manual therapy, Neuromuscular re-education, and Gait training.    Planned modalities to include: Cryotherapy (cold pack), Thermotherapy (hot pack), and Electrical stimulation - passive/unattended.        Visit Frequency: 3 times Per Week for 8 Weeks.       This plan was discussed with Patient.   Discussion participants: Agreed Upon Plan of Care             The patient's spiritual, cultural, and educational needs were considered, and the patient is agreeable to the plan of care and goals.     Education  Education was done with Patient. The patient's learning style includes Listening, Demonstration, and Pictures/video. The patient Verbalizes understanding and Demonstrates understanding.         HEP, prognosis, pathology, POC       Goals:   Active       LTG        Pt will improve baseline FOTO by at least 37 points        Start:  05/27/25    Expected End:  08/19/25            Pt will demonstrate WNL right knee AROM        Start:  05/27/25    Expected End:  08/19/25            Pt will demonstrate 5/5 RLE MMT        Start:  05/27/25    Expected End:  08/19/25            Pt will complete TUG in 10 seconds or less independently        Start:  05/27/25    Expected End:  08/19/25               STG       Pt will improve baseline FOTO score by at least 20 points        Start:  05/27/25    Expected End:  07/08/25            Pt will demonstrate good right quadriceps contraction and perform SLR without extensor lag       Start:  05/27/25    Expected End:  07/08/25            Pt will demonstrate at least 50% improvement in right knee AROM       Start:  05/27/25    Expected End:  07/08/25            Pt will complete TUG in 23 seconds of less with LRAD       Start:  05/27/25    Expected End:  07/08/25                Dilan Xie, PT

## 2025-05-27 NOTE — LETTER
May 27, 2025  Hugh Oro MD  [address] [address]    To whom it may concern,     The attached plan of care is being sent to you for review and reference.    You may indicate your approval by signing the document electronically, or by faxing/mailing a signed copy of the final page of this document back to the attention of Dilan Xie, PT:         Plan of Care 5/27/25   Effective from: 5/27/2025  Effective to: 8/19/2025    Plan ID: [id number]            Participants as of Finalize on 5/27/2025    Name Type Comments Contact Info    Hugh Oro MD Referring Provider  [phone]    Dilan Xie PT Physical Therapist         Last Plan Note     Author: Dilan Xie PT Status: Signed Last edited: 5/27/2025  3:00 PM         Outpatient Rehab    Physical Therapy Evaluation    Patient Name: Shu Mcclure  MRN: [id number]  YOB: 1970  Encounter Date: 5/27/2025    Therapy Diagnosis:   Encounter Diagnoses   Name Primary?    S/P TKR (total knee replacement), right Yes    Quadriceps weakness     Antalgic gait      Physician: Hugh Oro MD    Physician Orders: Eval and Treat  Medical Diagnosis: S/P TKR (total knee replacement), right    Visit # / Visits Authorized:  1 / 1  Insurance Authorization Period: 5/26/2025 to 5/26/2026  Date of Evaluation: 5/27/2025  Plan of Care Certification: 5/27/2025 to 8/19/25     Time In: 1500   Time Out: 1600  Total Time (in minutes): 60     Intake Outcome Measure for FOTO Survey    Therapist reviewed FOTO scores for Shu Mcclure on 5/27/2025.   FOTO report - see Media section or FOTO account episode details.     Intake Score: 9%         Subjective   History of Present Illness  Shu is a 54 y.o. female who reports to physical therapy with a chief concern of right knee pain.     The patient reports a medical diagnosis of R knee OA.  Patient reports a surgery of R TKA. Surgery occurred on 04/25/25.          History of Present Condition/Illness: Pt reports to PT following R TKA d/t chronic knee pain. Pt received HHPT for about 13 days after the surgery, but has not had any therapy since and states that it is now going on another 2 weeks.     Activities of Daily Living  Social history was obtained from Patient.               Previously independent with activities of daily living? Yes     Currently independent with activities of daily living? Yes          Previously independent with instrumental activities of daily living? Yes     Currently independent with instrumental activities of daily living? Yes              Pain     Patient reports a current pain level of 10/10.  Pain at worst is reported as 10/10.   Clinical Progression (since onset): Stable         Review of Systems  Patient reports: Osteoarthritis  Patient denies: Cardiac History and Diabetes  Additional Red Flag Details: HTN     Treatment History  Treatments  Discharged From Past 30 Days: Home health care  Previously Received Treatments: Yes  Previous Treatments: Physical therapy  Currently Receiving Treatments: No    Living Arrangements  Living Situation  Housing: Home independently  Living Arrangements: Children    Home Setup  Type of Structure: House  Home Access: Ramped entrance  Number of Levels in Home: One level        Employment  Employment Status: On disability          Past Medical History/Physical Systems Review:   Shu Mcclure  has a past medical history of GERD (gastroesophageal reflux disease), Hypertension, and IBS (irritable bowel syndrome).    Shu Mcclure  has a past surgical history that includes Hysterectomy; Cholecystectomy; and knee scope.    Shu has a current medication list which includes the following prescription(s): amlodipine, baclofen, clonidine, dicyclomine, escitalopram oxalate, hydrocodone-acetaminophen, hyoscyamine, ibuprofen, lorazepam, losartan, ondansetron, pantoprazole, tramadol, and trazodone.    Review of  patient's allergies indicates:  No Known Allergies     Objective   Posture                 Right knee position is: Flexed       Knee Observations  Right Knee Observations  Present: Atrophy, Incision, and Straight Leg Raise Extensor Lag  Right Knee Incision Observations  Present: Clean and Dry  Not Present: Drainage            Knee Palpation     Severe TTP                 Knee Range of Motion   Right Knee   Active (deg) Passive (deg) Pain   Flexion 45 58 Yes   Extension -15 -11 Yes                      Knee Strength   Right Strength Right Pain Left Strength Left  Pain   Flexion (S2) 2         Prone Flexion           Extension (L3) 1           Knee Extensor Lag  Lag Present: Right           Knee Patellar Screening       Right Patellar Mobility  Hypomobile: Medial, Lateral, Superior, and Inferior              Fall Risk  Functional mobility test results suggest the patient is: At Risk for Falls  Timed Up & Go (TUG)  Time: 34.2 seconds  Observations: Short strides and Shuffling  An older adult who takes >=12 seconds to complete the TUG is at risk for falling.    RW and hands required for STS       Gait Analysis  Gait Pattern: Antalgic  Walking Speed: Decreased    Right Side Walking Observations  Decreased: Step Length       Left Side Walking Observations  Decreased: Step Length     Pelvis Observations During Gait  Right: Hip Hike  Hip Observations During Gait  Right: Hip Circumducted  Knee Observations During Gait  Right: Decreased Knee Flexion in Swing and Decreased Knee Extension in Initial Contact  Ankle/Foot Observations During Gait  Right: Flat Foot Initial Contact  Gait Analysis Details  Ambulating w RW - pt able to improve gait mechanics with VC          Treatment:  Therapeutic Exercise  TE 1: seated knee ext stretch w 5# cuff for 15 min  TE 2: DKTC w SB 10 sec x 3 min  Manual Therapy  MT 1: supine: R patella mobs lat-med, inf-sup gr II; tib-fem mobs AP/PA gr II  Balance/Neuromuscular Re-Education  NMR 1: NMES  10:10 to R quad during EOB QS for 10 min  Modalities  Electrical Stimulation (Parameters): NMES 10:10 to R quad during EOB QS for 10 min    Time Entry(in minutes):  PT Evaluation (Moderate) Time Entry: 30  E-Stim (Unattended) Time Entry: 10  Manual Therapy Time Entry: 10  Neuromuscular Re-Education Time Entry: 10  Therapeutic Exercise Time Entry: 10    Assessment & Plan   Assessment  Shu presents with a condition of Moderate complexity.   Presentation of Symptoms: Stable  Will Comorbidities Impact Care: No       Functional Limitations: Transfers, Standing tolerance, Squatting, Range of motion, Painful locomotion/ambulation, Pain with ADLs/IADLs, Maintaining balance, Increased risk of fall, Gait limitations, Functional mobility, Decreased ambulation distance/endurance, Ambulating on uneven surfaces, Activity tolerance  Impairments: Pain with functional activity, Lack of appropriate home exercise program, Impaired physical strength, Impaired balance, Activity intolerance, Abnormal or restricted range of motion, Abnormal gait, Abnormal muscle tone, Abnormal muscle firing    Patient Goal for Therapy (PT): to improve knee motion and function  Prognosis: Good  Assessment Details: Shu Mcclure is a 54 y.o. female referred to outpatient physical therapy s/p R TKA on 4/25/25. Pt presents with severe atrophy of right quadriceps femoris mm with severe loss of knee AROM and joint mobility. Pt also ambulating with severe antalgic compensation and is considered a high risk of falling based on her TUG score. Pt unable to perform active quad contraction even with assist from NMES this date. Pt will benefit from skilled outpatient physical therapy to address the deficits stated above, provide education, and to maximize the pt's level of functional independence.      Plan  From a physical therapy perspective, the patient would benefit from: Skilled Rehab Services    Planned therapy interventions include: Therapeutic  exercise, Therapeutic activities, Manual therapy, Neuromuscular re-education, and Gait training.    Planned modalities to include: Cryotherapy (cold pack), Thermotherapy (hot pack), and Electrical stimulation - passive/unattended.        Visit Frequency: 3 times Per Week for 8 Weeks.       This plan was discussed with Patient.   Discussion participants: Agreed Upon Plan of Care             The patient's spiritual, cultural, and educational needs were considered, and the patient is agreeable to the plan of care and goals.     Education  Education was done with Patient. The patient's learning style includes Listening, Demonstration, and Pictures/video. The patient Verbalizes understanding and Demonstrates understanding.         HEP, prognosis, pathology, POC       Goals:   Active       LTG       Pt will improve baseline FOTO by at least 37 points        Start:  05/27/25    Expected End:  08/19/25            Pt will demonstrate WNL right knee AROM        Start:  05/27/25    Expected End:  08/19/25            Pt will demonstrate 5/5 RLE MMT        Start:  05/27/25    Expected End:  08/19/25            Pt will complete TUG in 10 seconds or less independently        Start:  05/27/25    Expected End:  08/19/25               STG       Pt will improve baseline FOTO score by at least 20 points        Start:  05/27/25    Expected End:  07/08/25            Pt will demonstrate good right quadriceps contraction and perform SLR without extensor lag       Start:  05/27/25    Expected End:  07/08/25            Pt will demonstrate at least 50% improvement in right knee AROM       Start:  05/27/25    Expected End:  07/08/25            Pt will complete TUG in 23 seconds of less with LRAD       Start:  05/27/25    Expected End:  07/08/25                Dilan Xie, PT           Current Participants as of 5/27/2025    Name Type Comments Contact Info    Hugh Oro MD Referring Provider  [phone]    Signature pending     Dilan Xie, PT Physical Therapist                  Sincerely,      Dilan Xie, PT  [facility]                                                            Dear Dilan Xie, PT,    RE: Ms. Shu Mcclure, MRN: [id number]    I certify that I have reviewed the attached plan of care and agree to the details within.        ___________________________  ___________________________  Provider Printed Name   Provider Signed Name      ___________________________  Date and Time

## 2025-05-28 ENCOUNTER — CLINICAL SUPPORT (OUTPATIENT)
Dept: REHABILITATION | Facility: HOSPITAL | Age: 55
End: 2025-05-28
Payer: MEDICAID

## 2025-05-28 DIAGNOSIS — Z96.651 S/P TKR (TOTAL KNEE REPLACEMENT), RIGHT: Primary | ICD-10-CM

## 2025-05-28 DIAGNOSIS — R26.89 ANTALGIC GAIT: ICD-10-CM

## 2025-05-28 DIAGNOSIS — M62.81 QUADRICEPS WEAKNESS: ICD-10-CM

## 2025-05-28 PROCEDURE — 97014 ELECTRIC STIMULATION THERAPY: CPT

## 2025-05-28 PROCEDURE — 97110 THERAPEUTIC EXERCISES: CPT

## 2025-05-28 PROCEDURE — 97140 MANUAL THERAPY 1/> REGIONS: CPT

## 2025-05-28 PROCEDURE — 97112 NEUROMUSCULAR REEDUCATION: CPT

## 2025-05-28 NOTE — PROGRESS NOTES
Outpatient Rehab    Physical Therapy Visit    Patient Name: Shu Mcclure  MRN: [id number]  YOB: 1970  Encounter Date: 5/28/2025    Therapy Diagnosis:   Encounter Diagnoses   Name Primary?    S/P TKR (total knee replacement), right Yes    Quadriceps weakness     Antalgic gait      Physician: Hugh Oro MD    Physician Orders: Eval and Treat  Medical Diagnosis: S/P total knee replacement, right    Visit # / Visits Authorized:  1 / 26  Insurance Authorization Period: 5/27/2025 to 8/19/2025  Date of Evaluation: 5/27/2025  Plan of Care Certification: 5/27/2025 to 8/19/2025      Time In: 0952   Time Out: 1045  Total Time (in minutes): 53     Subjective   Pt reports her knee is feeling a little better. Notes she performed some of her home exercises..         Objective       Knee Range of Motion   Right Knee   Active (deg) Passive (deg) Pain   Flexion 75       Extension -9                       Treatment:  Therapeutic Exercise  TE 1: supine knee ext stretch w 5# cuff for 5 min  TE 2: DKTC w SB 10 sec x 3 min  TE 3: calf stretch slant board Lv 3 for 2 min  TE 4: bridges x 10  TE 5: supine clams LIGHT x 15  TE 6: SLR w strap x 10  TE 7: nustep warm up seat 9 Lv 2 for 5 min  Manual Therapy  MT 1: supine: R patella mobs lat-med, inf-sup gr II; tib-fem mobs AP/PA gr II  Balance/Neuromuscular Re-Education  NMR 1: NMES 10:10 to R quad during supine QS w towel for 8 min  NMR 2: mini squats at rail 2 min  NMR 3: lateral WS at rail 2 min  NMR 4: SAQ 2x5  Modalities  Electrical Stimulation (Parameters): NMES 10:10 to R quad during EOB QS for 10 min    Time Entry(in minutes):  E-Stim (Unattended) Time Entry: 8  Manual Therapy Time Entry: 8  Neuromuscular Re-Education Time Entry: 20  Therapeutic Exercise Time Entry: 25    Assessment & Plan   Assessment: Began POC this date. Pt with good response to new exercises and stretches. Pt with min c/o pain and min to mod fatigue. Pt required VC and TC for proper set  up and performance of new activities. Pt with good improvement in AROM and quad activiation this date. Pt still requires cuing for gait mechanics, but verbalized awareness.  Evaluation/Treatment Tolerance: Patient tolerated treatment well    The patient will continue to benefit from skilled outpatient physical therapy in order to address the deficits listed in the problem list on the initial evaluation, provide patient and family education, and maximize the patients level of independence in the home and community environments.     The patient's spiritual, cultural, and educational needs were considered, and the patient is agreeable to the plan of care and goals.           Plan: Continue POC and progress as indicated toward set goals.    Goals:   Active       LTG       Pt will improve baseline FOTO by at least 37 points  (Progressing)       Start:  05/27/25    Expected End:  08/19/25            Pt will demonstrate WNL right knee AROM  (Progressing)       Start:  05/27/25    Expected End:  08/19/25            Pt will demonstrate 5/5 RLE MMT  (Progressing)       Start:  05/27/25    Expected End:  08/19/25            Pt will complete TUG in 10 seconds or less independently  (Progressing)       Start:  05/27/25    Expected End:  08/19/25               STG       Pt will improve baseline FOTO score by at least 20 points  (Progressing)       Start:  05/27/25    Expected End:  07/08/25            Pt will demonstrate good right quadriceps contraction and perform SLR without extensor lag (Progressing)       Start:  05/27/25    Expected End:  07/08/25            Pt will demonstrate at least 50% improvement in right knee AROM (Progressing)       Start:  05/27/25    Expected End:  07/08/25            Pt will complete TUG in 23 seconds of less with LRAD (Progressing)       Start:  05/27/25    Expected End:  07/08/25                Dilan Xie, PT

## 2025-05-29 ENCOUNTER — CLINICAL SUPPORT (OUTPATIENT)
Dept: REHABILITATION | Facility: HOSPITAL | Age: 55
End: 2025-05-29
Payer: MEDICAID

## 2025-05-29 DIAGNOSIS — Z96.651 S/P TKR (TOTAL KNEE REPLACEMENT), RIGHT: Primary | ICD-10-CM

## 2025-05-29 DIAGNOSIS — R26.89 ANTALGIC GAIT: ICD-10-CM

## 2025-05-29 DIAGNOSIS — M62.81 QUADRICEPS WEAKNESS: ICD-10-CM

## 2025-05-29 PROCEDURE — 97110 THERAPEUTIC EXERCISES: CPT

## 2025-05-29 PROCEDURE — 97140 MANUAL THERAPY 1/> REGIONS: CPT

## 2025-05-29 PROCEDURE — 97014 ELECTRIC STIMULATION THERAPY: CPT

## 2025-05-29 PROCEDURE — 97112 NEUROMUSCULAR REEDUCATION: CPT

## 2025-05-29 NOTE — PROGRESS NOTES
Outpatient Rehab    Physical Therapy Visit    Patient Name: Shu Mcclure  MRN: [id number]  YOB: 1970  Encounter Date: 5/29/2025    Therapy Diagnosis:   Encounter Diagnoses   Name Primary?    S/P TKR (total knee replacement), right Yes    Quadriceps weakness     Antalgic gait      Physician: Hugh Oro MD    Physician Orders: Eval and Treat  Medical Diagnosis: S/P total knee replacement, right    Visit # / Visits Authorized:  2 / 26  Insurance Authorization Period: 5/27/2025 to 8/19/2025  Date of Evaluation: 5/27/2025  Plan of Care Certification: 5/27/2025 to 8/19/2025      Time In: 1254   Time Out: 1350  Total Time (in minutes): 56     Subjective   Pt reports no new complaints this date..  Pain reported as 10/10.      Objective       Knee Range of Motion   Right Knee   Active (deg) Passive (deg) Pain   Flexion 80       Extension -9                       Treatment:  Therapeutic Exercise  TE 1: supine knee ext stretch w 5# cuff for 5 min  TE 2: DKTC w SB 10 sec x 3 min  TE 3: calf stretch slant board Lv 3 for 2 min  TE 4: bridges x 10  TE 5: supine clams LIGHT x 15  TE 6: SLR w strap x 10  TE 7: nustep warm up seat 9 Lv 2 for 5 min  Manual Therapy  MT 1: supine: R patella mobs lat-med, inf-sup gr II; tib-fem mobs AP/PA gr II // scar CFM  Balance/Neuromuscular Re-Education  NMR 1: NMES 10:10 to R quad during supine QS w towel for 8 min + 5 min during supine knee ext stretch  NMR 2: mini squats at rail 2 min  NMR 3: lateral WS at rail 2 min  NMR 4: SAQ 2x5  Modalities  Electrical Stimulation (Parameters): NMES 10:10 to R quad during QS and extension stretching for total 13 min    Time Entry(in minutes):  E-Stim (Unattended) Time Entry: 13  Manual Therapy Time Entry: 10  Neuromuscular Re-Education Time Entry: 23  Therapeutic Exercise Time Entry: 23    Assessment & Plan   Assessment: Continued POC this date. Pt with min c/o pain and min to mod fatigue. Pt with good improvement in AROM and quad  activiation again this date. Pt still requires cuing for gait mechanics, but with improved ability to perform when cued.  Evaluation/Treatment Tolerance: Patient tolerated treatment well    The patient will continue to benefit from skilled outpatient physical therapy in order to address the deficits listed in the problem list on the initial evaluation, provide patient and family education, and maximize the patients level of independence in the home and community environments.     The patient's spiritual, cultural, and educational needs were considered, and the patient is agreeable to the plan of care and goals.           Plan: Continue POC and progress as indicated toward set goals.    Goals:   Active       LTG       Pt will improve baseline FOTO by at least 37 points  (Progressing)       Start:  05/27/25    Expected End:  08/19/25            Pt will demonstrate WNL right knee AROM  (Progressing)       Start:  05/27/25    Expected End:  08/19/25            Pt will demonstrate 5/5 RLE MMT  (Progressing)       Start:  05/27/25    Expected End:  08/19/25            Pt will complete TUG in 10 seconds or less independently  (Progressing)       Start:  05/27/25    Expected End:  08/19/25               STG       Pt will improve baseline FOTO score by at least 20 points  (Progressing)       Start:  05/27/25    Expected End:  07/08/25            Pt will demonstrate good right quadriceps contraction and perform SLR without extensor lag (Progressing)       Start:  05/27/25    Expected End:  07/08/25            Pt will demonstrate at least 50% improvement in right knee AROM (Progressing)       Start:  05/27/25    Expected End:  07/08/25            Pt will complete TUG in 23 seconds of less with LRAD (Progressing)       Start:  05/27/25    Expected End:  07/08/25                Dilan Xie, PT

## 2025-06-03 ENCOUNTER — CLINICAL SUPPORT (OUTPATIENT)
Dept: REHABILITATION | Facility: HOSPITAL | Age: 55
End: 2025-06-03
Payer: MEDICAID

## 2025-06-03 DIAGNOSIS — M62.81 QUADRICEPS WEAKNESS: ICD-10-CM

## 2025-06-03 DIAGNOSIS — Z96.651 S/P TKR (TOTAL KNEE REPLACEMENT), RIGHT: Primary | ICD-10-CM

## 2025-06-03 DIAGNOSIS — R26.89 ANTALGIC GAIT: ICD-10-CM

## 2025-06-03 PROCEDURE — 97110 THERAPEUTIC EXERCISES: CPT

## 2025-06-03 PROCEDURE — 97014 ELECTRIC STIMULATION THERAPY: CPT

## 2025-06-03 PROCEDURE — 97530 THERAPEUTIC ACTIVITIES: CPT

## 2025-06-03 PROCEDURE — 97112 NEUROMUSCULAR REEDUCATION: CPT

## 2025-06-03 PROCEDURE — 97140 MANUAL THERAPY 1/> REGIONS: CPT

## 2025-06-05 ENCOUNTER — CLINICAL SUPPORT (OUTPATIENT)
Dept: REHABILITATION | Facility: HOSPITAL | Age: 55
End: 2025-06-05
Payer: MEDICAID

## 2025-06-05 DIAGNOSIS — M62.81 QUADRICEPS WEAKNESS: ICD-10-CM

## 2025-06-05 DIAGNOSIS — R26.89 ANTALGIC GAIT: ICD-10-CM

## 2025-06-05 DIAGNOSIS — Z96.651 S/P TKR (TOTAL KNEE REPLACEMENT), RIGHT: Primary | ICD-10-CM

## 2025-06-05 PROCEDURE — 97014 ELECTRIC STIMULATION THERAPY: CPT

## 2025-06-05 PROCEDURE — 97530 THERAPEUTIC ACTIVITIES: CPT

## 2025-06-05 PROCEDURE — 97112 NEUROMUSCULAR REEDUCATION: CPT

## 2025-06-05 PROCEDURE — 97110 THERAPEUTIC EXERCISES: CPT

## 2025-06-06 ENCOUNTER — CLINICAL SUPPORT (OUTPATIENT)
Dept: REHABILITATION | Facility: HOSPITAL | Age: 55
End: 2025-06-06
Payer: MEDICAID

## 2025-06-06 DIAGNOSIS — R26.89 ANTALGIC GAIT: ICD-10-CM

## 2025-06-06 DIAGNOSIS — M62.81 QUADRICEPS WEAKNESS: ICD-10-CM

## 2025-06-06 DIAGNOSIS — Z96.651 S/P TKR (TOTAL KNEE REPLACEMENT), RIGHT: Primary | ICD-10-CM

## 2025-06-06 PROCEDURE — 97530 THERAPEUTIC ACTIVITIES: CPT

## 2025-06-06 PROCEDURE — 97110 THERAPEUTIC EXERCISES: CPT

## 2025-06-06 PROCEDURE — 97140 MANUAL THERAPY 1/> REGIONS: CPT

## 2025-06-09 ENCOUNTER — CLINICAL SUPPORT (OUTPATIENT)
Dept: REHABILITATION | Facility: HOSPITAL | Age: 55
End: 2025-06-09
Payer: MEDICAID

## 2025-06-09 DIAGNOSIS — Z96.651 S/P TKR (TOTAL KNEE REPLACEMENT), RIGHT: Primary | ICD-10-CM

## 2025-06-09 DIAGNOSIS — R26.89 ANTALGIC GAIT: ICD-10-CM

## 2025-06-09 DIAGNOSIS — M62.81 QUADRICEPS WEAKNESS: ICD-10-CM

## 2025-06-09 PROCEDURE — 97110 THERAPEUTIC EXERCISES: CPT

## 2025-06-09 PROCEDURE — 97140 MANUAL THERAPY 1/> REGIONS: CPT

## 2025-06-09 PROCEDURE — 97530 THERAPEUTIC ACTIVITIES: CPT

## 2025-06-09 NOTE — PROGRESS NOTES
Outpatient Rehab    Physical Therapy Progress Note    Patient Name: Shu Mcclure  MRN: [id number]  YOB: 1970  Encounter Date: 6/9/2025    Therapy Diagnosis:   Encounter Diagnoses   Name Primary?    S/P TKR (total knee replacement), right Yes    Quadriceps weakness     Antalgic gait      Physician: Hugh Oro MD    Physician Orders: Eval and Treat  Medical Diagnosis: S/P total knee replacement, right  Surgical Diagnosis: R TKA   Surgical Date: 4/25/2025    Visit # / Visits Authorized:  6 / 26  Insurance Authorization Period: 5/27/2025 to 8/19/2025  Date of Evaluation: 5/27/2025  Plan of Care Certification: 5/27/2025 to 8/19/2025      Time In: 1338   Time Out: 1453  Total Time (in minutes): 75     FOTO:  Intake Score: 9%  Survey Score 2: 34%      Subjective   Pt reports she used her foot pedals over the weekend, but her knee continues to feel stiff..         Objective       Knee Range of Motion   Right Knee   Active (deg) Passive (deg) Pain   Flexion 82       Extension -9                       Fall Risk  Functional mobility test results suggest the patient is: At Risk for Falls       Gait Analysis  Gait Pattern: Antalgic  Walking Speed: Decreased    Right Side Walking Observations  Decreased: Step Length       Left Side Walking Observations  Decreased: Step Length     Knee Observations During Gait  Right: Decreased Knee Flexion in Swing and Decreased Knee Extension in Initial Contact         Treatment:  Therapeutic Exercise  TE 1: SAQ w AA 3x5  TE 2: SKTC w SB 10 sec x 2 min  TE 4: bridges x 15  TE 6: SLR w strap x 15  TE 7: EOB dependent prolonged dist 10# x 3 min  TE 8: ham curls YTB x 15  TE 9: LAQ x 15  TE 10: prone hang 3# x 3 min  Manual Therapy  MT 1: supine: R knee extension stretching  Therapeutic Activity  TA 3: WS on inv bosu x 10  TA 4: STS w min A to maintain neutral posturing x 5  TA 5: nustep warm up seat 8 Lv 3 for 6 min  TA 6: step ups fwd 6 inch step thru w UE x 10 ea  TA  7: side stepping no TB x 3 laps  TA 8: retrowalking along rail x 2 laps    Time Entry(in minutes):  Manual Therapy Time Entry: 15  Therapeutic Activity Time Entry: 30  Therapeutic Exercise Time Entry: 30    Assessment & Plan   Assessment: Pt is slowly progressing toward their set goals. Pt continues to report mod to severe pain with active and passive stretching of the right knee. Pt demonstrates improvements in their subjective and mild objective measures this date including improved gait mechanics, quad activiation, and improved ROM. Pt continues to experience severe antalgic gait, reduced AROM and joint mobility, and significant quad weakness. Pt would benefit from continuing the current POC to address their present deficits and to improve overall functional status.  Evaluation/Treatment Tolerance: Patient limited by pain    The patient will continue to benefit from skilled outpatient physical therapy in order to address the deficits listed in the problem list on the initial evaluation, provide patient and family education, and maximize the patients level of independence in the home and community environments.     The patient's spiritual, cultural, and educational needs were considered, and the patient is agreeable to the plan of care and goals.           Plan: Continue POC and progress as indicated toward set goals.    Goals:   Active       LTG       Pt will improve baseline FOTO by at least 37 points  (Progressing)       Start:  05/27/25    Expected End:  08/19/25            Pt will demonstrate WNL right knee AROM  (Progressing)       Start:  05/27/25    Expected End:  08/19/25            Pt will demonstrate 5/5 RLE MMT  (Progressing)       Start:  05/27/25    Expected End:  08/19/25            Pt will complete TUG in 10 seconds or less independently  (Progressing)       Start:  05/27/25    Expected End:  08/19/25               STG       Pt will improve baseline FOTO score by at least 20 points  (Met)        Start:  05/27/25    Expected End:  07/08/25    Resolved:  06/09/25         Pt will demonstrate good right quadriceps contraction and perform SLR without extensor lag (Progressing)       Start:  05/27/25    Expected End:  07/08/25            Pt will demonstrate at least 50% improvement in right knee AROM (Progressing)       Start:  05/27/25    Expected End:  07/08/25            Pt will complete TUG in 23 seconds of less with LRAD (Progressing)       Start:  05/27/25    Expected End:  07/08/25                Dilan Xie, PT

## 2025-06-10 ENCOUNTER — CLINICAL SUPPORT (OUTPATIENT)
Dept: REHABILITATION | Facility: HOSPITAL | Age: 55
End: 2025-06-10
Payer: MEDICAID

## 2025-06-10 DIAGNOSIS — R26.89 ANTALGIC GAIT: ICD-10-CM

## 2025-06-10 DIAGNOSIS — M62.81 QUADRICEPS WEAKNESS: ICD-10-CM

## 2025-06-10 DIAGNOSIS — Z96.651 S/P TKR (TOTAL KNEE REPLACEMENT), RIGHT: Primary | ICD-10-CM

## 2025-06-10 PROCEDURE — 97530 THERAPEUTIC ACTIVITIES: CPT

## 2025-06-10 PROCEDURE — 97110 THERAPEUTIC EXERCISES: CPT

## 2025-06-10 PROCEDURE — 97112 NEUROMUSCULAR REEDUCATION: CPT

## 2025-06-10 NOTE — PROGRESS NOTES
Outpatient Rehab    Physical Therapy Visit    Patient Name: Shu Mcclure  MRN: [id number]  YOB: 1970  Encounter Date: 6/10/2025    Therapy Diagnosis:   Encounter Diagnoses   Name Primary?    S/P TKR (total knee replacement), right Yes    Quadriceps weakness     Antalgic gait      Physician: Hugh Oro MD    Physician Orders: Eval and Treat  Medical Diagnosis: S/P total knee replacement, right    Visit # / Visits Authorized:  7 / 26  Insurance Authorization Period: 5/27/2025 to 8/19/2025  Date of Evaluation: 5/27/2025  Plan of Care Certification: 5/27/2025 to 8/19/2025      Time In: 1345   Time Out: 1455  Total Time (in minutes): 70     Subjective   Pt reports continued difficulty with pain. Notes not sleeping well and not eating either..         Objective            Treatment:  Therapeutic Exercise  TE 1: seated knee ext stretch 10# 5 min  TE 3: calf stretch slant board Lv 3 for 2 min  Balance/Neuromuscular Re-Education  NMR 5: ball toss on airex 2 min  NMR 6: external perturbations on airex 2 min  Therapeutic Activity  TA 3: WS on inv bosu x 2 min  TA 4: STS w min A to maintain neutral posturing x 5  TA 5: nustep warm up seat 6 Lv 3 for 6 min  TA 6: step ups fwd 6 inch step thru w UE x 10 ea w min A to maintain neutral posturing  TA 8: retrowalking on treadmill 3 min TC for adequate knee extension  TA 9: treadmill RLE pawing 3 min TC for knee flexion w min hip compensation  TA 10: step overs in place michaelle x 10 ea w min A to maintain neutral posturing    Time Entry(in minutes):  Neuromuscular Re-Education Time Entry: 10  Therapeutic Activity Time Entry: 50  Therapeutic Exercise Time Entry: 10    Assessment & Plan   Assessment: Modified POC for greater focus on functional knee extension and flexion. Pt responded well to treatment with min to mod c/o pain and fatigue. PT had to use max TC cuing and min assist to reduce hip compensatory motions given her lack of knee flexion and  extension.  Evaluation/Treatment Tolerance: Patient tolerated treatment well    The patient will continue to benefit from skilled outpatient physical therapy in order to address the deficits listed in the problem list on the initial evaluation, provide patient and family education, and maximize the patients level of independence in the home and community environments.     The patient's spiritual, cultural, and educational needs were considered, and the patient is agreeable to the plan of care and goals.           Plan: Continue POC and progress as indicated toward set goals.    Goals:   Active       LTG       Pt will improve baseline FOTO by at least 37 points  (Progressing)       Start:  05/27/25    Expected End:  08/19/25            Pt will demonstrate WNL right knee AROM  (Progressing)       Start:  05/27/25    Expected End:  08/19/25            Pt will demonstrate 5/5 RLE MMT  (Progressing)       Start:  05/27/25    Expected End:  08/19/25            Pt will complete TUG in 10 seconds or less independently  (Progressing)       Start:  05/27/25    Expected End:  08/19/25               STG       Pt will improve baseline FOTO score by at least 20 points  (Met)       Start:  05/27/25    Expected End:  07/08/25    Resolved:  06/09/25         Pt will demonstrate good right quadriceps contraction and perform SLR without extensor lag (Progressing)       Start:  05/27/25    Expected End:  07/08/25            Pt will demonstrate at least 50% improvement in right knee AROM (Progressing)       Start:  05/27/25    Expected End:  07/08/25            Pt will complete TUG in 23 seconds of less with LRAD (Progressing)       Start:  05/27/25    Expected End:  07/08/25                Dilan Xie, PT

## 2025-06-13 ENCOUNTER — CLINICAL SUPPORT (OUTPATIENT)
Dept: REHABILITATION | Facility: HOSPITAL | Age: 55
End: 2025-06-13
Payer: MEDICAID

## 2025-06-13 DIAGNOSIS — M62.81 QUADRICEPS WEAKNESS: ICD-10-CM

## 2025-06-13 DIAGNOSIS — Z96.651 S/P TKR (TOTAL KNEE REPLACEMENT), RIGHT: Primary | ICD-10-CM

## 2025-06-13 DIAGNOSIS — R26.89 ANTALGIC GAIT: ICD-10-CM

## 2025-06-13 PROCEDURE — 97530 THERAPEUTIC ACTIVITIES: CPT

## 2025-06-13 PROCEDURE — 97112 NEUROMUSCULAR REEDUCATION: CPT

## 2025-06-13 PROCEDURE — 97110 THERAPEUTIC EXERCISES: CPT

## 2025-06-13 NOTE — PROGRESS NOTES
Outpatient Rehab    Physical Therapy Visit    Patient Name: Shu Mcclure  MRN: [id number]  YOB: 1970  Encounter Date: 6/13/2025    Therapy Diagnosis:   Encounter Diagnoses   Name Primary?    S/P TKR (total knee replacement), right Yes    Quadriceps weakness     Antalgic gait      Physician: Hugh Oro MD    Physician Orders: Eval and Treat  Medical Diagnosis: S/P total knee replacement, right    Visit # / Visits Authorized:  8 / 26  Insurance Authorization Period: 5/27/2025 to 8/19/2025  Date of Evaluation: 5/27/2025  Plan of Care Certification: 5/27/2025 to 8/19/2025      Time In: 1135   Time Out: 1237  Total Time (in minutes): 62     Subjective   Pt reports no new complaints. Appears in better spirits today..         Objective            Treatment:  Therapeutic Exercise  TE 1: prone hang 5# x 3 min  TE 2: heel raises x 30  TE 3: calf stretch slant board Lv 3 for 2 min  Balance/Neuromuscular Re-Education  NMR 5: ball toss on airex 2 min  NMR 6: external perturbations on airex 2 min  Therapeutic Activity  TA 2: step down 6 inch x 10 ea  TA 3: WS RL on inv bosu x 2 min  TA 4: STS w min A to maintain neutral posturing x 4  TA 5: nustep warm up seat 6 Lv 4 for 6 min  TA 6: step ups fwd 6 inch step thru w UE x 10 ea w min A to maintain neutral posturing  TA 7: side stepping no TB x 3 laps  TA 8: retrowalking 3 laps along rail  TA 9: treadmill RLE pawing 5 min  TA 10: step overs in place michaelle x 10 ea w min A to maintain neutral posturing    Time Entry(in minutes):  Neuromuscular Re-Education Time Entry: 11  Therapeutic Activity Time Entry: 40  Therapeutic Exercise Time Entry: 11    Assessment & Plan   Assessment: Continued POC with greater focus on functional knee extension and flexion. Pt responded well to treatment with min to mod c/o pain and fatigue. PT used less TC cuing and assist to reduce hip compensatory motions given her lack of knee flexion and extension compared to earlier this  week, but still requires that assistance.  Evaluation/Treatment Tolerance: Patient tolerated treatment well    The patient will continue to benefit from skilled outpatient physical therapy in order to address the deficits listed in the problem list on the initial evaluation, provide patient and family education, and maximize the patients level of independence in the home and community environments.     The patient's spiritual, cultural, and educational needs were considered, and the patient is agreeable to the plan of care and goals.           Plan: Continue POC and progress as indicated toward set goals.    Goals:   Active       LTG       Pt will improve baseline FOTO by at least 37 points  (Progressing)       Start:  05/27/25    Expected End:  08/19/25            Pt will demonstrate WNL right knee AROM  (Progressing)       Start:  05/27/25    Expected End:  08/19/25            Pt will demonstrate 5/5 RLE MMT  (Progressing)       Start:  05/27/25    Expected End:  08/19/25            Pt will complete TUG in 10 seconds or less independently  (Progressing)       Start:  05/27/25    Expected End:  08/19/25               STG       Pt will improve baseline FOTO score by at least 20 points  (Met)       Start:  05/27/25    Expected End:  07/08/25    Resolved:  06/09/25         Pt will demonstrate good right quadriceps contraction and perform SLR without extensor lag (Progressing)       Start:  05/27/25    Expected End:  07/08/25            Pt will demonstrate at least 50% improvement in right knee AROM (Progressing)       Start:  05/27/25    Expected End:  07/08/25            Pt will complete TUG in 23 seconds of less with LRAD (Progressing)       Start:  05/27/25    Expected End:  07/08/25                Dilan Xie, PT

## 2025-06-17 ENCOUNTER — CLINICAL SUPPORT (OUTPATIENT)
Dept: REHABILITATION | Facility: HOSPITAL | Age: 55
End: 2025-06-17
Payer: MEDICAID

## 2025-06-17 DIAGNOSIS — M62.81 QUADRICEPS WEAKNESS: ICD-10-CM

## 2025-06-17 DIAGNOSIS — Z96.651 S/P TKR (TOTAL KNEE REPLACEMENT), RIGHT: Primary | ICD-10-CM

## 2025-06-17 DIAGNOSIS — R26.89 ANTALGIC GAIT: ICD-10-CM

## 2025-06-17 PROCEDURE — 97530 THERAPEUTIC ACTIVITIES: CPT

## 2025-06-17 PROCEDURE — 97112 NEUROMUSCULAR REEDUCATION: CPT

## 2025-06-17 PROCEDURE — 97110 THERAPEUTIC EXERCISES: CPT

## 2025-06-17 NOTE — PROGRESS NOTES
Outpatient Rehab    Physical Therapy Visit    Patient Name: Shu Mcclure  MRN: [id number]  YOB: 1970  Encounter Date: 6/17/2025    Therapy Diagnosis:   Encounter Diagnoses   Name Primary?    S/P TKR (total knee replacement), right Yes    Quadriceps weakness     Antalgic gait      Physician: Hugh Oro MD    Physician Orders: Eval and Treat  Medical Diagnosis: S/P total knee replacement, right  Surgical Diagnosis: R TKA   Surgical Date: 4/25/2025  Days Since Last Surgery: 53    Visit # / Visits Authorized:  9 / 26  Insurance Authorization Period: 5/27/2025 to 8/19/2025  Date of Evaluation: 5/27/2025  Plan of Care Certification: 5/27/2025 to 8/19/2025      Time In: 1338   Time Out: 1443  Total Time (in minutes): 65     FOTO:  Intake Score: 9%  Survey Score 2: 34%    Subjective   Pt presents with SPC. Pt reports her knee continues to experience severe knee pain and poor mobility..  Pain reported as 10/10.      Objective       Knee Swelling  Location of Measurement Right  (cm) Left  (cm)   20 cm Above Joint Line       10 cm Vastus Medialis Oblique       At Joint Line 39 34   15 cm Below Joint Line                 Knee Range of Motion   Right Knee   Active (deg) Passive (deg) Pain   Flexion 80   Yes   Extension -11   Yes                    Gait Analysis  Gait Pattern: Antalgic  Walking Speed: Decreased    Right Side Walking Observations  Decreased: Step Length       Left Side Walking Observations  Decreased: Step Length     Pelvis Observations During Gait  Right: Hip Hike  Hip Observations During Gait  Right: Hip Circumducted  Knee Observations During Gait  Right: Decreased Knee Flexion in Swing and Decreased Knee Extension in Initial Contact  Ankle/Foot Observations During Gait  Right: Flat Foot Initial Contact  Gait Analysis Details  Ambulating w SPC - pt able to improve gait mechanics with VC          Treatment:  Therapeutic Exercise  TE 1: prone hang 5# x 3 min  TE 2: heel raises x 30  TE  3: calf stretch slant board Lv 3 for 2 min  Balance/Neuromuscular Re-Education  NMR 5: ball toss on airex 2 min  NMR 6: external perturbations on airex 2 min  Therapeutic Activity  TA 2: step down 6 inch x 10 ea  TA 3: WS RL on inv bosu x 2 min  TA 5: nustep warm up seat 6 Lv 4 for 6 min  TA 6: step ups fwd 6 inch step thru w UE x 10 ea w min A to maintain neutral posturing  TA 7: side stepping no TB x 3 laps  TA 8: retrowalking 3 laps along rail  TA 9: treadmill RLE pawing 5 min  TA 10: step overs in place michaelle x 10 ea w min A to maintain neutral posturing    Time Entry(in minutes):  Neuromuscular Re-Education Time Entry: 13  Therapeutic Activity Time Entry: 39  Therapeutic Exercise Time Entry: 13    Assessment & Plan   Assessment: Continued POC. Pt with relatively no changes to her AROM or gait pattern and no changes to her subjective reporting. PT feels that pt should be seen by surgeon sooner than planned, but was unable to connect this date. POC will remain more functional in nature to best treat pt for improving her mobility.   Evaluation/Treatment Tolerance: Patient tolerated treatment well    The patient will continue to benefit from skilled outpatient physical therapy in order to address the deficits listed in the problem list on the initial evaluation, provide patient and family education, and maximize the patients level of independence in the home and community environments.     The patient's spiritual, cultural, and educational needs were considered, and the patient is agreeable to the plan of care and goals.           Plan: Continue POC and progress as indicated toward set goals.    Goals:   Active       LTG       Pt will improve baseline FOTO by at least 37 points  (Progressing)       Start:  05/27/25    Expected End:  08/19/25            Pt will demonstrate WNL right knee AROM  (Progressing)       Start:  05/27/25    Expected End:  08/19/25            Pt will demonstrate 5/5 RLE MMT  (Progressing)        Start:  05/27/25    Expected End:  08/19/25            Pt will complete TUG in 10 seconds or less independently  (Progressing)       Start:  05/27/25    Expected End:  08/19/25               STG       Pt will improve baseline FOTO score by at least 20 points  (Met)       Start:  05/27/25    Expected End:  07/08/25    Resolved:  06/09/25         Pt will demonstrate good right quadriceps contraction and perform SLR without extensor lag (Progressing)       Start:  05/27/25    Expected End:  07/08/25            Pt will demonstrate at least 50% improvement in right knee AROM (Progressing)       Start:  05/27/25    Expected End:  07/08/25            Pt will complete TUG in 23 seconds of less with LRAD (Progressing)       Start:  05/27/25    Expected End:  07/08/25                Dilan Xie, PT

## 2025-06-18 ENCOUNTER — DOCUMENTATION ONLY (OUTPATIENT)
Dept: REHABILITATION | Facility: HOSPITAL | Age: 55
End: 2025-06-18
Payer: MEDICAID

## 2025-06-18 NOTE — PROGRESS NOTES
PT spoke with Sandra (Nurse with [facility]) this date discussing the pt's status. Nurse acknowledged the lack of progress following the surgery and attributed it to pt's lack of following directions. Nurse said she would reach out to the pt regarding a sooner appointment, but noted that pt would likely have to undergo a manipulation.     Dilan Xie, PT  06/18/2025

## 2025-06-19 ENCOUNTER — CLINICAL SUPPORT (OUTPATIENT)
Dept: REHABILITATION | Facility: HOSPITAL | Age: 55
End: 2025-06-19
Payer: MEDICAID

## 2025-06-19 DIAGNOSIS — R26.89 ANTALGIC GAIT: ICD-10-CM

## 2025-06-19 DIAGNOSIS — Z96.651 S/P TKR (TOTAL KNEE REPLACEMENT), RIGHT: ICD-10-CM

## 2025-06-19 DIAGNOSIS — M62.81 QUADRICEPS WEAKNESS: Primary | ICD-10-CM

## 2025-06-19 PROCEDURE — 97110 THERAPEUTIC EXERCISES: CPT

## 2025-06-19 PROCEDURE — 97112 NEUROMUSCULAR REEDUCATION: CPT

## 2025-06-19 PROCEDURE — 97530 THERAPEUTIC ACTIVITIES: CPT

## 2025-06-19 NOTE — PROGRESS NOTES
Outpatient Rehab    Physical Therapy Visit    Patient Name: Shu Mcclure  MRN: [id number]  YOB: 1970  Encounter Date: 6/19/2025    Therapy Diagnosis:   Encounter Diagnoses   Name Primary?    S/P TKR (total knee replacement), right     Quadriceps weakness Yes    Antalgic gait      Physician: Hugh Oro MD    Physician Orders: Eval and Treat  Medical Diagnosis: S/P total knee replacement, right  Surgical Diagnosis: R TKA   Surgical Date: 4/25/2025  Days Since Last Surgery: 55    Visit # / Visits Authorized:  10 / 26  Insurance Authorization Period: 5/27/2025 to 8/19/2025  Date of Evaluation: 5/27/2025  Plan of Care Certification: 5/27/2025 to 8/19/2025      Time In: 1338   Time Out: 1432  Total Time (in minutes): 54     FOTO:  Intake Score:  %  Survey Score 2:  %    Subjective   Pt presents with RW. Pt reports her knee feels a little better today. Notes she is back with the walker d/t feeling unsteady with the SPC. Pt states she was contacted by the surgeon's nurse and got her appointment moved earlier (July 2)..         Objective            Treatment:  Therapeutic Exercise  TE 2: heel raises x 30  TE 3: calf stretch slant board Lv 3 for 2 min  Balance/Neuromuscular Re-Education  NMR 5: ball toss on airex 2 min  NMR 6: external perturbations on airex 2 min  Therapeutic Activity  TA 1: mini squats x 12  TA 2: step down 6 inch x 12 ea  TA 3: WS RL on inv bosu x 2 min  TA 5: pedals warm up F/R for 6 min  TA 6: step ups fwd 6 inch step thru w UE x 12 ea  TA 7: side stepping YTB x 2 laps  TA 8: retrowalking 3 laps along rail  TA 9: treadmill RLE pawing 5 min  TA 10: step overs in place michaelle F/L x 12 ea    Time Entry(in minutes):  Neuromuscular Re-Education Time Entry: 8  Therapeutic Activity Time Entry: 38  Therapeutic Exercise Time Entry: 8    Assessment & Plan   Assessment: Continued POC. Pt with less c/o pain and improved functional flexion and extension of the right knee as evident with  less compensatory hip and trunk movements.   Evaluation/Treatment Tolerance: Patient tolerated treatment well    The patient will continue to benefit from skilled outpatient physical therapy in order to address the deficits listed in the problem list on the initial evaluation, provide patient and family education, and maximize the patients level of independence in the home and community environments.     The patient's spiritual, cultural, and educational needs were considered, and the patient is agreeable to the plan of care and goals.           Plan: Continue POC and progress as indicated toward set goals.    Goals:   Active       LTG       Pt will improve baseline FOTO by at least 37 points  (Progressing)       Start:  05/27/25    Expected End:  08/19/25            Pt will demonstrate WNL right knee AROM  (Progressing)       Start:  05/27/25    Expected End:  08/19/25            Pt will demonstrate 5/5 RLE MMT  (Progressing)       Start:  05/27/25    Expected End:  08/19/25            Pt will complete TUG in 10 seconds or less independently  (Progressing)       Start:  05/27/25    Expected End:  08/19/25               STG       Pt will improve baseline FOTO score by at least 20 points  (Met)       Start:  05/27/25    Expected End:  07/08/25    Resolved:  06/09/25         Pt will demonstrate good right quadriceps contraction and perform SLR without extensor lag (Progressing)       Start:  05/27/25    Expected End:  07/08/25            Pt will demonstrate at least 50% improvement in right knee AROM (Progressing)       Start:  05/27/25    Expected End:  07/08/25            Pt will complete TUG in 23 seconds of less with LRAD (Progressing)       Start:  05/27/25    Expected End:  07/08/25                Dilan Xie, PT

## 2025-06-20 ENCOUNTER — CLINICAL SUPPORT (OUTPATIENT)
Dept: REHABILITATION | Facility: HOSPITAL | Age: 55
End: 2025-06-20
Payer: MEDICAID

## 2025-06-20 DIAGNOSIS — Z96.651 S/P TKR (TOTAL KNEE REPLACEMENT), RIGHT: ICD-10-CM

## 2025-06-20 DIAGNOSIS — R26.89 ANTALGIC GAIT: ICD-10-CM

## 2025-06-20 DIAGNOSIS — M62.81 QUADRICEPS WEAKNESS: Primary | ICD-10-CM

## 2025-06-20 PROCEDURE — 97110 THERAPEUTIC EXERCISES: CPT

## 2025-06-20 PROCEDURE — 97530 THERAPEUTIC ACTIVITIES: CPT

## 2025-06-20 NOTE — PROGRESS NOTES
Outpatient Rehab    Physical Therapy Visit    Patient Name: Shu Mcclure  MRN: [id number]  YOB: 1970  Encounter Date: 6/20/2025    Therapy Diagnosis:   Encounter Diagnoses   Name Primary?    Quadriceps weakness Yes    S/P TKR (total knee replacement), right     Antalgic gait      Physician: Hugh Oro MD    Physician Orders: Eval and Treat  Medical Diagnosis: S/P total knee replacement, right  Surgical Diagnosis: R TKA   Surgical Date: 4/25/2025  Days Since Last Surgery: 56    Visit # / Visits Authorized:  11 / 26  Insurance Authorization Period: 5/27/2025 to 8/19/2025  Date of Evaluation: 5/27/2025  Plan of Care Certification: 5/27/2025 to 8/19/2025      Time In: 1155   Time Out: 1259  Total Time (in minutes): 64     FOTO:  Intake Score:  %  Survey Score 2:  %    Subjective   Pt reports no new complaints this date. Continues presenting with RW..         Objective            Treatment:  Therapeutic Exercise  TE 1: prone hang 5# x 3 min  TE 2: heel raises x 30  TE 3: calf stretch slant board Lv 3 for 2 min  TE 4: TKE YTB 5 sec x 3 min  Therapeutic Activity  TA 1: mini squats x 12  TA 2: step down 6 inch x 12 ea  TA 3: WS RL on inv bosu x 2 min  TA 5: pedals warm up F/R for 6 min  TA 6: step ups fwd 6 inch step thru w UE x 12 ea  TA 7: side stepping YTB x 2 laps  TA 8: retrowalking 3 laps along rail  TA 9: treadmill RLE pawing 5 min  TA 10: step overs in place michaelle F/L x 12 ea    Time Entry(in minutes):  Therapeutic Activity Time Entry: 38  Therapeutic Exercise Time Entry: 26    Assessment & Plan   Assessment: Continued POC. Pt again with less c/o pain and improved functional flexion and extension of the right knee as evident with less compensatory hip and trunk movements. Added TKE with improved quad activation.   Evaluation/Treatment Tolerance: Patient tolerated treatment well    The patient will continue to benefit from skilled outpatient physical therapy in order to address the  deficits listed in the problem list on the initial evaluation, provide patient and family education, and maximize the patients level of independence in the home and community environments.     The patient's spiritual, cultural, and educational needs were considered, and the patient is agreeable to the plan of care and goals.           Plan: Continue POC and progress as indicated toward set goals. Reassess next.    Goals:   Active       LTG       Pt will improve baseline FOTO by at least 37 points  (Progressing)       Start:  05/27/25    Expected End:  08/19/25            Pt will demonstrate WNL right knee AROM  (Progressing)       Start:  05/27/25    Expected End:  08/19/25            Pt will demonstrate 5/5 RLE MMT  (Progressing)       Start:  05/27/25    Expected End:  08/19/25            Pt will complete TUG in 10 seconds or less independently  (Progressing)       Start:  05/27/25    Expected End:  08/19/25               STG       Pt will improve baseline FOTO score by at least 20 points  (Met)       Start:  05/27/25    Expected End:  07/08/25    Resolved:  06/09/25         Pt will demonstrate good right quadriceps contraction and perform SLR without extensor lag (Progressing)       Start:  05/27/25    Expected End:  07/08/25            Pt will demonstrate at least 50% improvement in right knee AROM (Progressing)       Start:  05/27/25    Expected End:  07/08/25            Pt will complete TUG in 23 seconds of less with LRAD (Progressing)       Start:  05/27/25    Expected End:  07/08/25                Dilan Xie, PT

## 2025-06-24 ENCOUNTER — CLINICAL SUPPORT (OUTPATIENT)
Dept: REHABILITATION | Facility: HOSPITAL | Age: 55
End: 2025-06-24
Payer: MEDICAID

## 2025-06-24 DIAGNOSIS — M62.81 QUADRICEPS WEAKNESS: Primary | ICD-10-CM

## 2025-06-24 DIAGNOSIS — Z96.651 S/P TKR (TOTAL KNEE REPLACEMENT), RIGHT: ICD-10-CM

## 2025-06-24 DIAGNOSIS — R26.89 ANTALGIC GAIT: ICD-10-CM

## 2025-06-24 PROCEDURE — 97110 THERAPEUTIC EXERCISES: CPT

## 2025-06-24 PROCEDURE — 97530 THERAPEUTIC ACTIVITIES: CPT

## 2025-06-24 NOTE — PROGRESS NOTES
Outpatient Rehab    Physical Therapy Progress Note    Patient Name: Shu Mcclure  MRN: [id number]  YOB: 1970  Encounter Date: 6/24/2025    Therapy Diagnosis:   Encounter Diagnoses   Name Primary?    Quadriceps weakness Yes    S/P TKR (total knee replacement), right     Antalgic gait      Physician: Hugh Oro MD    Physician Orders: Eval and Treat  Medical Diagnosis: S/P total knee replacement, right  Surgical Diagnosis: R TKA   Surgical Date: 4/25/2025  Days Since Last Surgery: 60    Visit # / Visits Authorized:  12 / 26  Insurance Authorization Period: 5/27/2025 to 8/19/2025  Date of Evaluation: 5/27/2025  Plan of Care Certification: 5/27/2025 to 8/19/2025      Time In: 1353   Time Out: 1503  Total Time (in minutes): 70     FOTO:  Intake Score: 9%  Survey Score 2: 34%  Survey Score 3: 50%    Subjective   Pt reports no new complaints. Notes continued knee pain. Pt continues to present with RW. Pt adds that her whole right side is starting to ache now..  Pain reported as 10/10.      Objective       Knee Range of Motion   Right Knee   Active (deg) Passive (deg) Pain   Flexion 85   Yes   Extension -9   Yes                      Hip Strength - Planes of Motion   Right Strength Right Pain Left Strength Left  Pain   Flexion (L2) 4         Extension           ABduction           ADduction           Internal Rotation           External Rotation               Knee Strength   Right Strength Right Pain Left Strength Left  Pain   Flexion (S2) 3+ Yes       Prone Flexion           Extension (L3) 3- Yes              Ankle/Foot Strength - Planes of Motion   Right Strength Right Pain Left Strength Left  Pain   Dorsiflexion (L4) 4         Plantar Flexion (S1) 4         Inversion           Eversion           Great Toe Flexion           Great Toe Extension (L5)           Lesser Toes Flexion           Lesser Toes Extension                     Fall Risk  Functional mobility test results suggest the patient  is: At Risk for Falls  Timed Up & Go (TUG)  Time: 15.4 seconds  Observations: Short strides and Shuffling  An older adult who takes >=12 seconds to complete the TUG is at risk for falling.    RW and hands required for STS      Ambulation Assistance Required  Surface With  Assistive Device Without Assistive Device Details   Level Independent Minimal assist      Uneven         Curb           Gait Analysis  Gait Pattern: Antalgic  Walking Speed: Decreased    Right Side Walking Observations  Decreased: Step Length       Left Side Walking Observations  Decreased: Step Length     Pelvis Observations During Gait  Right: Hip Hike  Hip Observations During Gait  Right: Hip Circumducted  Knee Observations During Gait  Right: Decreased Knee Flexion in Swing and Decreased Knee Extension in Initial Contact  Ankle/Foot Observations During Gait  Right: Flat Foot Initial Contact  Gait Analysis Details  Ambulating w RW d/t reporting unstable when ambulating with SPC          Treatment:  Therapeutic Exercise  TE 1: prone hang 5# x 3 min  TE 2: heel raises x 30  TE 3: calf stretch slant board Lv 3 for 2 min  TE 4: TKE YTB 5 sec x 3 min  Therapeutic Activity  TA 1: mini squats x 12  TA 2: step down 6 inch x 12 ea  TA 3: WS RL on inv bosu x 2 min  TA 5: pedals warm up F/R for 6 min  TA 6: step ups fwd 6 inch step thru w UE x 12 ea  TA 7: side stepping YTB x 2 laps  TA 8: retrowalking 3 laps along rail  TA 10: step overs in place michaelle F/L x 12 ea  Other Activities  Activity 1: reassessment    Time Entry(in minutes):  Therapeutic Activity Time Entry: 40  Therapeutic Exercise Time Entry: 30    Assessment & Plan   Assessment: Pt is progressing toward her set goals, but continues to report severe knee pain. Pt demonstrates improvements in her subjective and objective measures this date. Pt continues to experience antalgic gait ambulating with RW, reduced RLE muscle strength, joint mobility, and AROM. Pt would benefit from continuing the  current POC to address their present deficits and to improve overall functional status.    Evaluation/Treatment Tolerance: Patient tolerated treatment well    The patient will continue to benefit from skilled outpatient physical therapy in order to address the deficits listed in the problem list on the initial evaluation, provide patient and family education, and maximize the patients level of independence in the home and community environments.     The patient's spiritual, cultural, and educational needs were considered, and the patient is agreeable to the plan of care and goals.           Plan: Continue POC and progress as indicated toward set goals.    Goals:   Active       LTG       Pt will improve baseline FOTO by at least 37 points  (Met)       Start:  05/27/25    Expected End:  08/19/25    Resolved:  06/24/25         Pt will demonstrate WNL right knee AROM  (Progressing)       Start:  05/27/25    Expected End:  08/19/25            Pt will demonstrate 5/5 RLE MMT  (Progressing)       Start:  05/27/25    Expected End:  08/19/25            Pt will complete TUG in 10 seconds or less independently  (Progressing)       Start:  05/27/25    Expected End:  08/19/25              Resolved       STG       Pt will improve baseline FOTO score by at least 20 points  (Met)       Start:  05/27/25    Expected End:  07/08/25    Resolved:  06/09/25         Pt will demonstrate good right quadriceps contraction and perform SLR without extensor lag (Met)       Start:  05/27/25    Expected End:  07/08/25    Resolved:  06/24/25         Pt will demonstrate at least 50% improvement in right knee AROM (Met)       Start:  05/27/25    Expected End:  07/08/25    Resolved:  06/24/25         Pt will complete TUG in 23 seconds of less with LRAD (Met)       Start:  05/27/25    Expected End:  07/08/25    Resolved:  06/24/25             Dilan Xie, PT

## 2025-06-26 ENCOUNTER — CLINICAL SUPPORT (OUTPATIENT)
Dept: REHABILITATION | Facility: HOSPITAL | Age: 55
End: 2025-06-26
Payer: MEDICAID

## 2025-06-26 DIAGNOSIS — M62.81 QUADRICEPS WEAKNESS: Primary | ICD-10-CM

## 2025-06-26 DIAGNOSIS — Z96.651 S/P TKR (TOTAL KNEE REPLACEMENT), RIGHT: ICD-10-CM

## 2025-06-26 DIAGNOSIS — R26.89 ANTALGIC GAIT: ICD-10-CM

## 2025-06-26 PROCEDURE — 97530 THERAPEUTIC ACTIVITIES: CPT

## 2025-06-26 PROCEDURE — 97110 THERAPEUTIC EXERCISES: CPT

## 2025-06-26 NOTE — PROGRESS NOTES
Outpatient Rehab    Physical Therapy Visit    Patient Name: Shu Mcclure  MRN: [id number]  YOB: 1970  Encounter Date: 6/26/2025    Therapy Diagnosis:   Encounter Diagnoses   Name Primary?    Quadriceps weakness Yes    S/P TKR (total knee replacement), right     Antalgic gait      Physician: Hugh Oro MD    Physician Orders: Eval and Treat  Medical Diagnosis: S/P total knee replacement, right  Surgical Diagnosis: R TKA   Surgical Date: 4/25/2025  Days Since Last Surgery: 62    Visit # / Visits Authorized:  13 / 26  Insurance Authorization Period: 5/27/2025 to 8/19/2025  Date of Evaluation: 5/27/2025  Plan of Care Certification: 5/27/2025 to 8/19/2025      Time In: 1346   Time Out: 1446  Total Time (in minutes): 60     Subjective   Pt reports no new complaints..  Pain reported as 10/10.      Objective            Treatment:  Therapeutic Exercise  TE 1: prone hang 7.5# x 3 min  TE 2: heel raises x 30  TE 3: calf stretch slant board Lv 3 for 2 min  TE 4: TKE RTB 10 sec x 2 min  Therapeutic Activity  TA 1: mini squats x 12  TA 2: step down 6 inch x 12 ea  TA 3: WS RL on inv bosu x 2 min  TA 5: nustep warm up L 4 for 6 min  TA 6: step ups fwd 6 inch step thru w 1 UE x 12 ea  TA 7: side stepping YTB x 2 laps  TA 8: retrowalking 3 laps along rail  TA 9: treadmill RLE pawing 5 min  TA 10: step overs in place michaelle F/L x 12 ea    Time Entry(in minutes):  Therapeutic Activity Time Entry: 40  Therapeutic Exercise Time Entry: 20    Assessment & Plan   Assessment: Continued POC with slight progression this date. Pt continues to demonstrate significant challenge with AROM leading to gait deficits; however, she has better AROM when using the knee during functional tasks such as stepping up or over obstacles and while retrowalking.   Evaluation/Treatment Tolerance: Patient tolerated treatment well    The patient will continue to benefit from skilled outpatient physical therapy in order to address the  deficits listed in the problem list on the initial evaluation, provide patient and family education, and maximize the patients level of independence in the home and community environments.     The patient's spiritual, cultural, and educational needs were considered, and the patient is agreeable to the plan of care and goals.           Plan: Continue POC and progress as indicated toward set goals.    Goals:   Active       LTG       Pt will improve baseline FOTO by at least 37 points  (Met)       Start:  05/27/25    Expected End:  08/19/25    Resolved:  06/24/25         Pt will demonstrate WNL right knee AROM  (Progressing)       Start:  05/27/25    Expected End:  08/19/25            Pt will demonstrate 5/5 RLE MMT  (Progressing)       Start:  05/27/25    Expected End:  08/19/25            Pt will complete TUG in 10 seconds or less independently  (Progressing)       Start:  05/27/25    Expected End:  08/19/25              Resolved       STG       Pt will improve baseline FOTO score by at least 20 points  (Met)       Start:  05/27/25    Expected End:  07/08/25    Resolved:  06/09/25         Pt will demonstrate good right quadriceps contraction and perform SLR without extensor lag (Met)       Start:  05/27/25    Expected End:  07/08/25    Resolved:  06/24/25         Pt will demonstrate at least 50% improvement in right knee AROM (Met)       Start:  05/27/25    Expected End:  07/08/25    Resolved:  06/24/25         Pt will complete TUG in 23 seconds of less with LRAD (Met)       Start:  05/27/25    Expected End:  07/08/25    Resolved:  06/24/25             Dilan Xie, PT

## 2025-06-27 ENCOUNTER — CLINICAL SUPPORT (OUTPATIENT)
Dept: REHABILITATION | Facility: HOSPITAL | Age: 55
End: 2025-06-27
Payer: MEDICAID

## 2025-06-27 DIAGNOSIS — M62.81 QUADRICEPS WEAKNESS: Primary | ICD-10-CM

## 2025-06-27 DIAGNOSIS — Z96.651 S/P TKR (TOTAL KNEE REPLACEMENT), RIGHT: ICD-10-CM

## 2025-06-27 DIAGNOSIS — R26.89 ANTALGIC GAIT: ICD-10-CM

## 2025-06-27 PROCEDURE — 97140 MANUAL THERAPY 1/> REGIONS: CPT

## 2025-06-27 PROCEDURE — 97110 THERAPEUTIC EXERCISES: CPT

## 2025-06-27 PROCEDURE — 97530 THERAPEUTIC ACTIVITIES: CPT

## 2025-06-27 NOTE — PROGRESS NOTES
Outpatient Rehab    Physical Therapy Visit    Patient Name: Shu Mcclure  MRN: [id number]  YOB: 1970  Encounter Date: 6/27/2025    Therapy Diagnosis:   Encounter Diagnoses   Name Primary?    Quadriceps weakness Yes    S/P TKR (total knee replacement), right     Antalgic gait      Physician: Hugh Oro MD    Physician Orders: Eval and Treat  Medical Diagnosis: S/P total knee replacement, right  Surgical Diagnosis: R TKA   Surgical Date: 4/25/2025  Days Since Last Surgery: 63    Visit # / Visits Authorized:  14 / 26  Insurance Authorization Period: 5/27/2025 to 8/19/2025  Date of Evaluation: 5/27/2025  Plan of Care Certification: 5/27/2025 to 8/19/2025      Time In: 1145   Time Out: 1238  Total Time (in minutes): 53     Subjective   Pt reports her right lower back and hip region are hurting today..         Objective            Treatment:  Therapeutic Exercise  TE 1: prone hang 7.5# x 3 min  TE 2: heel raises x 30  TE 3: calf stretch slant board Lv 3 for 2 min  TE 4: TKE RTB 10 sec x 2 min  Manual Therapy  MT 1: scar CFM  Therapeutic Activity  TA 1: mini squats x 12  TA 2: step down 6 inch x 12 ea  TA 3: WS RL on inv bosu x 2 min  TA 5: nustep warm up L 4 for 6 min  TA 6: step ups fwd 6 inch step thru w 1 UE x 12 ea  TA 7: side stepping RTB x 2 laps  TA 8: retrowalking 3 laps along rail  TA 9: treadmill RLE pawing 5 min  TA 10: step overs in place michaelle F/L x 12 ea    Time Entry(in minutes):  Manual Therapy Time Entry: 8  Therapeutic Activity Time Entry: 30  Therapeutic Exercise Time Entry: 15    Assessment & Plan   Assessment: Continued POC this date. Pt continues to require TC to reduce excessive hip and pelvic motion on the right.   Evaluation/Treatment Tolerance: Patient tolerated treatment well    The patient will continue to benefit from skilled outpatient physical therapy in order to address the deficits listed in the problem list on the initial evaluation, provide patient and  family education, and maximize the patients level of independence in the home and community environments.     The patient's spiritual, cultural, and educational needs were considered, and the patient is agreeable to the plan of care and goals.           Plan: Continue POC and progress as indicated toward set goals.    Goals:   Active       LTG       Pt will improve baseline FOTO by at least 37 points  (Met)       Start:  05/27/25    Expected End:  08/19/25    Resolved:  06/24/25         Pt will demonstrate WNL right knee AROM  (Progressing)       Start:  05/27/25    Expected End:  08/19/25            Pt will demonstrate 5/5 RLE MMT  (Progressing)       Start:  05/27/25    Expected End:  08/19/25            Pt will complete TUG in 10 seconds or less independently  (Progressing)       Start:  05/27/25    Expected End:  08/19/25              Resolved       STG       Pt will improve baseline FOTO score by at least 20 points  (Met)       Start:  05/27/25    Expected End:  07/08/25    Resolved:  06/09/25         Pt will demonstrate good right quadriceps contraction and perform SLR without extensor lag (Met)       Start:  05/27/25    Expected End:  07/08/25    Resolved:  06/24/25         Pt will demonstrate at least 50% improvement in right knee AROM (Met)       Start:  05/27/25    Expected End:  07/08/25    Resolved:  06/24/25         Pt will complete TUG in 23 seconds of less with LRAD (Met)       Start:  05/27/25    Expected End:  07/08/25    Resolved:  06/24/25             Dilan Xie, PT

## 2025-07-01 ENCOUNTER — CLINICAL SUPPORT (OUTPATIENT)
Dept: REHABILITATION | Facility: HOSPITAL | Age: 55
End: 2025-07-01
Payer: MEDICAID

## 2025-07-01 DIAGNOSIS — M62.81 QUADRICEPS WEAKNESS: Primary | ICD-10-CM

## 2025-07-01 DIAGNOSIS — Z96.651 S/P TKR (TOTAL KNEE REPLACEMENT), RIGHT: ICD-10-CM

## 2025-07-01 DIAGNOSIS — R26.89 ANTALGIC GAIT: ICD-10-CM

## 2025-07-01 PROCEDURE — 97110 THERAPEUTIC EXERCISES: CPT

## 2025-07-01 PROCEDURE — 97530 THERAPEUTIC ACTIVITIES: CPT

## 2025-07-01 NOTE — PROGRESS NOTES
Outpatient Rehab    Physical Therapy Visit    Patient Name: Shu Mcclure  MRN: [id number]  YOB: 1970  Encounter Date: 7/1/2025    Therapy Diagnosis:   Encounter Diagnoses   Name Primary?    Quadriceps weakness Yes    S/P TKR (total knee replacement), right     Antalgic gait      Physician: Hugh Oro MD    Physician Orders: Eval and Treat  Medical Diagnosis: S/P total knee replacement, right  Surgical Diagnosis: R TKA   Surgical Date: 4/25/2025  Days Since Last Surgery: 67    Visit # / Visits Authorized:  15 / 26  Insurance Authorization Period: 5/27/2025 to 8/19/2025  Date of Evaluation: 5/27/2025  Plan of Care Certification: 5/27/2025 to 8/19/2025      Time In: 1345   Time Out: 1427  Total Time (in minutes): 42     Subjective   Pt reports no new complaints this date..         Objective            Treatment:  Therapeutic Exercise  TE 1: prone hang 7.5# x 3 min  TE 7: prone ham curls x 15  TE 8: ham curls RTB x 15  TE 9: LAQ x 15  TE 10: prone QS 10 sec x 2 min  Therapeutic Activity  TA 4: STS from elevated hi-lo mat w gradual lowering each set 4x5  TA 5: pedals F/R warm up 6 min  TA 10: hurdles fwd w SPC x 2 laps    Time Entry(in minutes):  Therapeutic Activity Time Entry: 17  Therapeutic Exercise Time Entry: 25    Assessment & Plan   Assessment: Continued POC this date with modification on more one joint activities. Pt tolerated modified POC well with min c/o pain and mod fatigue. Pt continues to demonstrate significant restriction with ROM. Of note, pt will see surgeon tomorrow.  Evaluation/Treatment Tolerance: Patient tolerated treatment well    The patient will continue to benefit from skilled outpatient physical therapy in order to address the deficits listed in the problem list on the initial evaluation, provide patient and family education, and maximize the patients level of independence in the home and community environments.     The patient's spiritual, cultural, and  educational needs were considered, and the patient is agreeable to the plan of care and goals.           Plan: Continue POC and progress as indicated toward set goals.    Goals:   Active       LTG       Pt will improve baseline FOTO by at least 37 points  (Met)       Start:  05/27/25    Expected End:  08/19/25    Resolved:  06/24/25         Pt will demonstrate WNL right knee AROM  (Progressing)       Start:  05/27/25    Expected End:  08/19/25            Pt will demonstrate 5/5 RLE MMT  (Progressing)       Start:  05/27/25    Expected End:  08/19/25            Pt will complete TUG in 10 seconds or less independently  (Progressing)       Start:  05/27/25    Expected End:  08/19/25              Resolved       STG       Pt will improve baseline FOTO score by at least 20 points  (Met)       Start:  05/27/25    Expected End:  07/08/25    Resolved:  06/09/25         Pt will demonstrate good right quadriceps contraction and perform SLR without extensor lag (Met)       Start:  05/27/25    Expected End:  07/08/25    Resolved:  06/24/25         Pt will demonstrate at least 50% improvement in right knee AROM (Met)       Start:  05/27/25    Expected End:  07/08/25    Resolved:  06/24/25         Pt will complete TUG in 23 seconds of less with LRAD (Met)       Start:  05/27/25    Expected End:  07/08/25    Resolved:  06/24/25             Dilan Xie, PT

## 2025-07-03 ENCOUNTER — CLINICAL SUPPORT (OUTPATIENT)
Dept: REHABILITATION | Facility: HOSPITAL | Age: 55
End: 2025-07-03
Payer: MEDICAID

## 2025-07-03 VITALS — DIASTOLIC BLOOD PRESSURE: 89 MMHG | SYSTOLIC BLOOD PRESSURE: 130 MMHG

## 2025-07-03 DIAGNOSIS — Z96.651 S/P TKR (TOTAL KNEE REPLACEMENT), RIGHT: ICD-10-CM

## 2025-07-03 DIAGNOSIS — R26.89 ANTALGIC GAIT: ICD-10-CM

## 2025-07-03 DIAGNOSIS — M62.81 QUADRICEPS WEAKNESS: Primary | ICD-10-CM

## 2025-07-03 PROCEDURE — 97530 THERAPEUTIC ACTIVITIES: CPT

## 2025-07-03 PROCEDURE — 97110 THERAPEUTIC EXERCISES: CPT

## 2025-07-03 NOTE — PROGRESS NOTES
Outpatient Rehab    Physical Therapy Visit    Patient Name: Shu Mcclure  MRN: [id number]  YOB: 1970  Encounter Date: 7/3/2025    Therapy Diagnosis:   Encounter Diagnoses   Name Primary?    Quadriceps weakness Yes    S/P TKR (total knee replacement), right     Antalgic gait      Physician: Hugh Oro MD    Physician Orders: Eval and Treat  Medical Diagnosis: S/P total knee replacement, right  Surgical Diagnosis: R TKA   Surgical Date: 4/25/2025  Days Since Last Surgery: 69    Visit # / Visits Authorized:  16 / 26  Insurance Authorization Period: 5/27/2025 to 8/19/2025  Date of Evaluation: 5/27/2025  Plan of Care Certification: 5/27/2025 to 8/19/2025      Time In: 1345   Time Out: 1434  Total Time (in minutes): 49     Subjective   Pt reports the surgeon refugio blood from her surgical knee joint and sent it off for testing. Pt notes the surgeon stated she should continue with therapy in the meantime..         Objective   Vital Signs  /89   BP Location: Left arm  BP Position: Sitting                  Treatment:  Therapeutic Exercise  TE 1: prone hang 7.5# x 3 min  TE 7: prone ham curls x 15  TE 8: ham curls RTB x 15  TE 9: LAQ x 15  TE 10: prone QS 10 sec x 2 min  Therapeutic Activity  TA 4: STS from elevated hi-lo mat w gradual lowering each set 4x5  TA 5: nustep S6 L4 for 6 min  TA 10: hurdles fwd w SPC x 2 laps    Time Entry(in minutes):  Therapeutic Activity Time Entry: 11  Therapeutic Exercise Time Entry: 38    Assessment & Plan   Assessment: Continued modified POC this date. Pt's knee appeared less swollen upon entry, but c/o more pain. Pt was slow moving today, but participated fully.   Evaluation/Treatment Tolerance: Patient tolerated treatment well    The patient will continue to benefit from skilled outpatient physical therapy in order to address the deficits listed in the problem list on the initial evaluation, provide patient and family education, and maximize the patients  level of independence in the home and community environments.     The patient's spiritual, cultural, and educational needs were considered, and the patient is agreeable to the plan of care and goals.           Plan: Continue POC and progress as indicated toward set goals.    Goals:   Active       LTG       Pt will improve baseline FOTO by at least 37 points  (Met)       Start:  05/27/25    Expected End:  08/19/25    Resolved:  06/24/25         Pt will demonstrate WNL right knee AROM  (Progressing)       Start:  05/27/25    Expected End:  08/19/25            Pt will demonstrate 5/5 RLE MMT  (Progressing)       Start:  05/27/25    Expected End:  08/19/25            Pt will complete TUG in 10 seconds or less independently  (Progressing)       Start:  05/27/25    Expected End:  08/19/25              Resolved       STG       Pt will improve baseline FOTO score by at least 20 points  (Met)       Start:  05/27/25    Expected End:  07/08/25    Resolved:  06/09/25         Pt will demonstrate good right quadriceps contraction and perform SLR without extensor lag (Met)       Start:  05/27/25    Expected End:  07/08/25    Resolved:  06/24/25         Pt will demonstrate at least 50% improvement in right knee AROM (Met)       Start:  05/27/25    Expected End:  07/08/25    Resolved:  06/24/25         Pt will complete TUG in 23 seconds of less with LRAD (Met)       Start:  05/27/25    Expected End:  07/08/25    Resolved:  06/24/25             Dilan Xie, PT

## 2025-07-08 ENCOUNTER — CLINICAL SUPPORT (OUTPATIENT)
Dept: REHABILITATION | Facility: HOSPITAL | Age: 55
End: 2025-07-08
Payer: MEDICAID

## 2025-07-08 DIAGNOSIS — Z96.651 S/P TKR (TOTAL KNEE REPLACEMENT), RIGHT: ICD-10-CM

## 2025-07-08 DIAGNOSIS — R26.89 ANTALGIC GAIT: ICD-10-CM

## 2025-07-08 DIAGNOSIS — M62.81 QUADRICEPS WEAKNESS: Primary | ICD-10-CM

## 2025-07-08 PROCEDURE — 97530 THERAPEUTIC ACTIVITIES: CPT

## 2025-07-08 PROCEDURE — 97110 THERAPEUTIC EXERCISES: CPT

## 2025-07-08 NOTE — PROGRESS NOTES
Outpatient Rehab    Physical Therapy Visit    Patient Name: Shu Mcclure  MRN: [id number]  YOB: 1970  Encounter Date: 7/8/2025    Therapy Diagnosis:   Encounter Diagnoses   Name Primary?    Quadriceps weakness Yes    S/P TKR (total knee replacement), right     Antalgic gait      Physician: Hugh Oro MD    Physician Orders: Eval and Treat  Medical Diagnosis: S/P total knee replacement, right  Surgical Diagnosis: R TKA   Surgical Date: 4/25/2025  Days Since Last Surgery: 74    Visit # / Visits Authorized:  17 / 26  Insurance Authorization Period: 5/27/2025 to 8/19/2025  Date of Evaluation: 5/27/2025  Plan of Care Certification: 5/27/2025 to 8/19/2025      Time In: 1358   Time Out: 1444  Total Time (in minutes): 46     Subjective   Pt reports her pain continues to be high. Admits she has not done much walking or stretching d/t pain..         Objective              Treatment:  Therapeutic Exercise  TE 1: prone hang 7.5# x 3 min  TE 7: prone ham curls AA up and ECC down x 15  TE 8: ham curls RTB x 15  TE 9: LAQ 1# x 15  TE 10: prone QS 10 sec x 2 min  Therapeutic Activity  TA 3: WS RL on inv bosu x 2 min  TA 4: STS from elevated hi-lo mat w gradual lowering each set 4x5 - RLE bias  TA 5: nustep S6 L4 for 6 min  TA 9: treadmill RLE pawing 5 min  TA 10: hurdles fwd w SPC x 3 laps    Time Entry(in minutes):  Therapeutic Activity Time Entry: 23  Therapeutic Exercise Time Entry: 23    Assessment & Plan   Assessment: Continued POC this date with slight progression. Pt's knee continues to appear less swollen. Pt with better participation and tolerance to activities.   Evaluation/Treatment Tolerance: Patient tolerated treatment well    The patient will continue to benefit from skilled outpatient physical therapy in order to address the deficits listed in the problem list on the initial evaluation, provide patient and family education, and maximize the patients level of independence in the home and  community environments.     The patient's spiritual, cultural, and educational needs were considered, and the patient is agreeable to the plan of care and goals.           Plan: Continue POC and progress as indicated toward set goals.    Goals:   Active       LTG       Pt will improve baseline FOTO by at least 37 points  (Met)       Start:  05/27/25    Expected End:  08/19/25    Resolved:  06/24/25         Pt will demonstrate WNL right knee AROM  (Progressing)       Start:  05/27/25    Expected End:  08/19/25            Pt will demonstrate 5/5 RLE MMT  (Progressing)       Start:  05/27/25    Expected End:  08/19/25            Pt will complete TUG in 10 seconds or less independently  (Progressing)       Start:  05/27/25    Expected End:  08/19/25              Resolved       STG       Pt will improve baseline FOTO score by at least 20 points  (Met)       Start:  05/27/25    Expected End:  07/08/25    Resolved:  06/09/25         Pt will demonstrate good right quadriceps contraction and perform SLR without extensor lag (Met)       Start:  05/27/25    Expected End:  07/08/25    Resolved:  06/24/25         Pt will demonstrate at least 50% improvement in right knee AROM (Met)       Start:  05/27/25    Expected End:  07/08/25    Resolved:  06/24/25         Pt will complete TUG in 23 seconds of less with LRAD (Met)       Start:  05/27/25    Expected End:  07/08/25    Resolved:  06/24/25             Dilan Xie, PT

## 2025-07-10 ENCOUNTER — CLINICAL SUPPORT (OUTPATIENT)
Dept: REHABILITATION | Facility: HOSPITAL | Age: 55
End: 2025-07-10
Payer: MEDICAID

## 2025-07-10 DIAGNOSIS — Z96.651 S/P TKR (TOTAL KNEE REPLACEMENT), RIGHT: ICD-10-CM

## 2025-07-10 DIAGNOSIS — M62.81 QUADRICEPS WEAKNESS: Primary | ICD-10-CM

## 2025-07-10 DIAGNOSIS — R26.89 ANTALGIC GAIT: ICD-10-CM

## 2025-07-10 PROCEDURE — 97110 THERAPEUTIC EXERCISES: CPT

## 2025-07-10 PROCEDURE — 97530 THERAPEUTIC ACTIVITIES: CPT

## 2025-07-10 NOTE — PROGRESS NOTES
Outpatient Rehab    Physical Therapy Visit    Patient Name: Shu Mcclure  MRN: [id number]  YOB: 1970  Encounter Date: 7/10/2025    Therapy Diagnosis:   Encounter Diagnoses   Name Primary?    Quadriceps weakness Yes    S/P TKR (total knee replacement), right     Antalgic gait      Physician: Hugh Oro MD    Physician Orders: Eval and Treat  Medical Diagnosis: S/P total knee replacement, right  Surgical Diagnosis: R TKA   Surgical Date: 4/25/2025  Days Since Last Surgery: 76    Visit # / Visits Authorized:  18 / 26  Insurance Authorization Period: 5/27/2025 to 8/19/2025  Date of Evaluation: 5/27/2025  Plan of Care Certification: 5/27/2025 to 8/19/2025      Time In: 1400   Time Out: 1456  Total Time (in minutes): 56     Subjective   Pt reports her pain continues to be high. States she again is not doing much at home. Pt also states she is awaiting a call back from the surgeon..         Objective              Treatment:  Therapeutic Exercise  TE 1: prone hang 7.5# x 3 min  TE 7: prone ham curls AA up and ECC down x 15  TE 8: ham curls RTB x 15  TE 9: LAQ 1# x 15  TE 10: prone QS 10 sec x 2 min  Therapeutic Activity  TA 3: WS RL on inv bosu x 2 min  TA 4: STS from elevated hi-lo mat w gradual lowering each set 4x5 - mirror feedback  TA 5: nustep S6 L4 for 6 min  TA 9: treadmill RLE pawing 7 min  TA 10: hurdles fwd w SPC x 3 laps    Time Entry(in minutes):  Therapeutic Activity Time Entry: 30  Therapeutic Exercise Time Entry: 26    Assessment & Plan   Assessment: Continued POC this date. Pt's knee was warm to the touch today. Pt with good participation and tolerance to activities.   Evaluation/Treatment Tolerance: Patient tolerated treatment well    The patient will continue to benefit from skilled outpatient physical therapy in order to address the deficits listed in the problem list on the initial evaluation, provide patient and family education, and maximize the patients level of  independence in the home and community environments.     The patient's spiritual, cultural, and educational needs were considered, and the patient is agreeable to the plan of care and goals.           Plan: Continue POC and progress as indicated toward set goals. Plan to perform more palliative activities next.    Goals:   Active       LTG       Pt will improve baseline FOTO by at least 37 points  (Met)       Start:  05/27/25    Expected End:  08/19/25    Resolved:  06/24/25         Pt will demonstrate WNL right knee AROM  (Progressing)       Start:  05/27/25    Expected End:  08/19/25            Pt will demonstrate 5/5 RLE MMT  (Progressing)       Start:  05/27/25    Expected End:  08/19/25            Pt will complete TUG in 10 seconds or less independently  (Progressing)       Start:  05/27/25    Expected End:  08/19/25              Resolved       STG       Pt will improve baseline FOTO score by at least 20 points  (Met)       Start:  05/27/25    Expected End:  07/08/25    Resolved:  06/09/25         Pt will demonstrate good right quadriceps contraction and perform SLR without extensor lag (Met)       Start:  05/27/25    Expected End:  07/08/25    Resolved:  06/24/25         Pt will demonstrate at least 50% improvement in right knee AROM (Met)       Start:  05/27/25    Expected End:  07/08/25    Resolved:  06/24/25         Pt will complete TUG in 23 seconds of less with LRAD (Met)       Start:  05/27/25    Expected End:  07/08/25    Resolved:  06/24/25             Dilan Xie, PT

## 2025-07-11 ENCOUNTER — CLINICAL SUPPORT (OUTPATIENT)
Dept: REHABILITATION | Facility: HOSPITAL | Age: 55
End: 2025-07-11
Payer: MEDICAID

## 2025-07-11 DIAGNOSIS — M62.81 QUADRICEPS WEAKNESS: Primary | ICD-10-CM

## 2025-07-11 DIAGNOSIS — R26.89 ANTALGIC GAIT: ICD-10-CM

## 2025-07-11 DIAGNOSIS — Z96.651 S/P TKR (TOTAL KNEE REPLACEMENT), RIGHT: ICD-10-CM

## 2025-07-11 PROCEDURE — 97010 HOT OR COLD PACKS THERAPY: CPT

## 2025-07-11 PROCEDURE — 97110 THERAPEUTIC EXERCISES: CPT

## 2025-07-11 PROCEDURE — 97140 MANUAL THERAPY 1/> REGIONS: CPT

## 2025-07-11 NOTE — PROGRESS NOTES
Outpatient Rehab    Physical Therapy Visit    Patient Name: Shu Mcclure  MRN: [id number]  YOB: 1970  Encounter Date: 7/11/2025    Therapy Diagnosis:   Encounter Diagnoses   Name Primary?    Quadriceps weakness Yes    S/P TKR (total knee replacement), right     Antalgic gait      Physician: Hugh Oro MD    Physician Orders: Eval and Treat  Medical Diagnosis: S/P total knee replacement, right  Surgical Diagnosis: R TKA   Surgical Date: 4/25/2025  Days Since Last Surgery: 77    Visit # / Visits Authorized:  19 / 26  Insurance Authorization Period: 5/27/2025 to 8/19/2025  Date of Evaluation: 5/27/2025  Plan of Care Certification: 5/27/2025 to 8/19/2025      Time In: 1135   Time Out: 1223  Total Time (in minutes): 48     Subjective   Pt reports she is going to see the surgeon again on Wednesday (7/16/25) of the coming week..         Objective              Treatment:  Therapeutic Exercise  TE 5: seated ext stretch 7.5# performed w MH 10 min  Manual Therapy  MT 1: seated: R patella mobs lat-med, inf-sup gr II; tib-fem mobs AP/PA. dist gr II; strertch flexion and extension  Therapeutic Activity  TA 5: pedals fwd and rev w assistance to make full rotations 5 min  Modalities  Moist Heat (min): 10 min to R knee w seated ext stretch  Cryotherapy (Minutes\Location): 10 min to R knee post-tx    Time Entry(in minutes):  Hot/Cold Pack Time Entry: 20  Manual Therapy Time Entry: 23  Therapeutic Exercise Time Entry: 15    Assessment & Plan   Assessment: Modified POC to be more palliative, but still focusing on ROM. Pt responded well to hot and cold modalities which bookended flexion and extension stretching.   Evaluation/Treatment Tolerance: Patient tolerated treatment well    The patient will continue to benefit from skilled outpatient physical therapy in order to address the deficits listed in the problem list on the initial evaluation, provide patient and family education, and maximize the patients  level of independence in the home and community environments.     The patient's spiritual, cultural, and educational needs were considered, and the patient is agreeable to the plan of care and goals.           Plan: Continue POC and progress as indicated toward set goals.    Goals:   Active       LTG       Pt will improve baseline FOTO by at least 37 points  (Met)       Start:  05/27/25    Expected End:  08/19/25    Resolved:  06/24/25         Pt will demonstrate WNL right knee AROM  (Progressing)       Start:  05/27/25    Expected End:  08/19/25            Pt will demonstrate 5/5 RLE MMT  (Progressing)       Start:  05/27/25    Expected End:  08/19/25            Pt will complete TUG in 10 seconds or less independently  (Progressing)       Start:  05/27/25    Expected End:  08/19/25              Resolved       STG       Pt will improve baseline FOTO score by at least 20 points  (Met)       Start:  05/27/25    Expected End:  07/08/25    Resolved:  06/09/25         Pt will demonstrate good right quadriceps contraction and perform SLR without extensor lag (Met)       Start:  05/27/25    Expected End:  07/08/25    Resolved:  06/24/25         Pt will demonstrate at least 50% improvement in right knee AROM (Met)       Start:  05/27/25    Expected End:  07/08/25    Resolved:  06/24/25         Pt will complete TUG in 23 seconds of less with LRAD (Met)       Start:  05/27/25    Expected End:  07/08/25    Resolved:  06/24/25             Dilan Xie, PT

## 2025-07-15 ENCOUNTER — CLINICAL SUPPORT (OUTPATIENT)
Dept: REHABILITATION | Facility: HOSPITAL | Age: 55
End: 2025-07-15
Payer: MEDICAID

## 2025-07-15 DIAGNOSIS — R26.89 ANTALGIC GAIT: ICD-10-CM

## 2025-07-15 DIAGNOSIS — M62.81 QUADRICEPS WEAKNESS: Primary | ICD-10-CM

## 2025-07-15 DIAGNOSIS — Z96.651 S/P TKR (TOTAL KNEE REPLACEMENT), RIGHT: ICD-10-CM

## 2025-07-15 PROCEDURE — 97010 HOT OR COLD PACKS THERAPY: CPT

## 2025-07-15 PROCEDURE — 97110 THERAPEUTIC EXERCISES: CPT

## 2025-07-15 PROCEDURE — 97530 THERAPEUTIC ACTIVITIES: CPT

## 2025-07-15 NOTE — PROGRESS NOTES
Outpatient Rehab    Physical Therapy Visit    Patient Name: Shu Mcclure  MRN: [id number]  YOB: 1970  Encounter Date: 7/15/2025    Therapy Diagnosis:   Encounter Diagnoses   Name Primary?    Quadriceps weakness Yes    S/P TKR (total knee replacement), right     Antalgic gait      Physician: Hugh Oro MD    Physician Orders: Eval and Treat  Medical Diagnosis: S/P total knee replacement, right  Surgical Diagnosis: R TKA   Surgical Date: 4/25/2025  Days Since Last Surgery: 81    Visit # / Visits Authorized:  20 / 26  Insurance Authorization Period: 5/27/2025 to 8/19/2025  Date of Evaluation: 5/27/2025  Plan of Care Certification: 5/27/2025 to 8/19/2025      Time In: 1356   Time Out: 1450  Total Time (in minutes): 54     FOTO:  Intake Score: 9%  Survey Score 2: 34%  Survey Score 3: 50%      Subjective   Pt reports she is going to see the surgeon again tomorrow..         Objective            Treatment:  Therapeutic Exercise  TE 1: prone hang 7.5# x 3 min  TE 7: prone ham curls AA up and ECC down x 15  TE 8: ham curls RTB x 15  TE 9: LAQ 1# x 15  TE 10: prone QS 10 sec x 2 min  Therapeutic Activity  TA 3: WS RL on inv bosu x 2 min  TA 4: STS from elevated hi-lo mat w gradual lowering each set 4x5 - mirror feedback  TA 5: nustep S6 L4 for 6 min  TA 10: hurdles fwd w SPC x 3 laps  Modalities  Cryotherapy (Minutes\Location): 10 min to R knee post-tx    Time Entry(in minutes):  Hot/Cold Pack Time Entry: 10  Therapeutic Activity Time Entry: 23  Therapeutic Exercise Time Entry: 21    Assessment & Plan   Assessment: Continued POC this date. Pt with good participation, but mod reports of pain with activities.  Evaluation/Treatment Tolerance: Patient tolerated treatment well    The patient will continue to benefit from skilled outpatient physical therapy in order to address the deficits listed in the problem list on the initial evaluation, provide patient and family education, and maximize the  patients level of independence in the home and community environments.     The patient's spiritual, cultural, and educational needs were considered, and the patient is agreeable to the plan of care and goals.           Plan: Continue POC and progress as indicated toward set goals.    Goals:   Active       LTG       Pt will improve baseline FOTO by at least 37 points  (Met)       Start:  05/27/25    Expected End:  08/19/25    Resolved:  06/24/25         Pt will demonstrate WNL right knee AROM  (Progressing)       Start:  05/27/25    Expected End:  08/19/25            Pt will demonstrate 5/5 RLE MMT  (Progressing)       Start:  05/27/25    Expected End:  08/19/25            Pt will complete TUG in 10 seconds or less independently  (Progressing)       Start:  05/27/25    Expected End:  08/19/25              Resolved       STG       Pt will improve baseline FOTO score by at least 20 points  (Met)       Start:  05/27/25    Expected End:  07/08/25    Resolved:  06/09/25         Pt will demonstrate good right quadriceps contraction and perform SLR without extensor lag (Met)       Start:  05/27/25    Expected End:  07/08/25    Resolved:  06/24/25         Pt will demonstrate at least 50% improvement in right knee AROM (Met)       Start:  05/27/25    Expected End:  07/08/25    Resolved:  06/24/25         Pt will complete TUG in 23 seconds of less with LRAD (Met)       Start:  05/27/25    Expected End:  07/08/25    Resolved:  06/24/25             Dilan Xie, PT

## 2025-07-17 ENCOUNTER — DOCUMENTATION ONLY (OUTPATIENT)
Dept: REHABILITATION | Facility: HOSPITAL | Age: 55
End: 2025-07-17
Payer: MEDICAID

## 2025-07-17 NOTE — PROGRESS NOTES
Pt entered clinic this date heavily influenced by oxycodone which was prescribed yesterday by her orthopedic surgeon. Pt states that the surgeon decided to perform some procedure, but she could not recall what it was called. PT called surgeon and spoke with his nurse. Nurse informed PT that pt would undergo lysis of adhesions on 7/23/25 and that she could hold therapy until after that procedure.  PT informed pt of this information and sent pt home. Plan for pt to return to therapy on 7/24/25 to continue with her POC.     Dilan Xie, PT  07/17/2025

## 2025-07-24 ENCOUNTER — DOCUMENTATION ONLY (OUTPATIENT)
Dept: REHABILITATION | Facility: HOSPITAL | Age: 55
End: 2025-07-24
Payer: MEDICAID

## 2025-07-24 NOTE — PROGRESS NOTES
Pt to be discharged at this time d/t receiving HH services following lysis of adhesions in R knee as a result of a TKA.     Dilan Xie, PT  07/24/2025

## 2025-07-31 ENCOUNTER — LAB REQUISITION (OUTPATIENT)
Dept: LAB | Facility: HOSPITAL | Age: 55
End: 2025-07-31
Payer: MEDICAID

## 2025-07-31 DIAGNOSIS — M25.661 STIFFNESS OF RIGHT KNEE, NOT ELSEWHERE CLASSIFIED: ICD-10-CM

## 2025-07-31 LAB
BACTERIA #/AREA URNS AUTO: ABNORMAL /HPF
BILIRUB UR QL STRIP.AUTO: NEGATIVE
CLARITY UR: CLEAR
COLOR UR AUTO: YELLOW
ERYTHROCYTE [SEDIMENTATION RATE] IN BLOOD: 51 MM/HR (ref 0–20)
GLUCOSE UR QL STRIP: NEGATIVE
HGB UR QL STRIP: ABNORMAL
KETONES UR QL STRIP: ABNORMAL
LEUKOCYTE ESTERASE UR QL STRIP: ABNORMAL
NITRITE UR QL STRIP: NEGATIVE
PH UR STRIP: 5.5 [PH]
PROT UR QL STRIP: NEGATIVE
RBC #/AREA URNS AUTO: ABNORMAL /HPF
SP GR UR STRIP.AUTO: 1.02 (ref 1–1.03)
SQUAMOUS #/AREA URNS AUTO: ABNORMAL /HPF
UROBILINOGEN UR STRIP-ACNC: 0.2
WBC #/AREA URNS AUTO: ABNORMAL /HPF

## 2025-07-31 PROCEDURE — 81003 URINALYSIS AUTO W/O SCOPE: CPT

## 2025-07-31 PROCEDURE — 85652 RBC SED RATE AUTOMATED: CPT

## 2025-07-31 PROCEDURE — 87086 URINE CULTURE/COLONY COUNT: CPT

## 2025-07-31 PROCEDURE — 86140 C-REACTIVE PROTEIN: CPT

## 2025-08-01 LAB — CRP SERPL-MCNC: 11.7 MG/L

## 2025-08-03 LAB — BACTERIA UR CULT: NORMAL

## 2025-08-26 ENCOUNTER — HOSPITAL ENCOUNTER (EMERGENCY)
Facility: HOSPITAL | Age: 55
Discharge: HOME | End: 2025-08-26
Attending: EMERGENCY MEDICINE
Payer: MEDICAID